# Patient Record
Sex: FEMALE | Race: WHITE | NOT HISPANIC OR LATINO | Employment: OTHER | URBAN - NONMETROPOLITAN AREA
[De-identification: names, ages, dates, MRNs, and addresses within clinical notes are randomized per-mention and may not be internally consistent; named-entity substitution may affect disease eponyms.]

---

## 2017-01-13 ENCOUNTER — OFFICE VISIT (OUTPATIENT)
Dept: FAMILY MEDICINE CLINIC | Facility: CLINIC | Age: 82
End: 2017-01-13

## 2017-01-13 VITALS
DIASTOLIC BLOOD PRESSURE: 72 MMHG | BODY MASS INDEX: 24.63 KG/M2 | TEMPERATURE: 97.7 F | OXYGEN SATURATION: 98 % | SYSTOLIC BLOOD PRESSURE: 118 MMHG | HEIGHT: 63 IN | HEART RATE: 64 BPM | WEIGHT: 139 LBS

## 2017-01-13 DIAGNOSIS — J06.9 ACUTE URI: ICD-10-CM

## 2017-01-13 DIAGNOSIS — J44.1 ACUTE EXACERBATION OF CHRONIC OBSTRUCTIVE AIRWAYS DISEASE (HCC): Primary | ICD-10-CM

## 2017-01-13 DIAGNOSIS — J20.9 ACUTE BRONCHITIS, UNSPECIFIED ORGANISM: ICD-10-CM

## 2017-01-13 PROCEDURE — 96372 THER/PROPH/DIAG INJ SC/IM: CPT | Performed by: INTERNAL MEDICINE

## 2017-01-13 PROCEDURE — 99213 OFFICE O/P EST LOW 20 MIN: CPT | Performed by: INTERNAL MEDICINE

## 2017-01-13 RX ORDER — TRIAMCINOLONE ACETONIDE 40 MG/ML
10 INJECTION, SUSPENSION INTRA-ARTICULAR; INTRAMUSCULAR ONCE
Status: COMPLETED | OUTPATIENT
Start: 2017-01-13 | End: 2017-01-13

## 2017-01-13 RX ORDER — METHYLPREDNISOLONE ACETATE 80 MG/ML
80 INJECTION, SUSPENSION INTRA-ARTICULAR; INTRALESIONAL; INTRAMUSCULAR; SOFT TISSUE ONCE
Status: COMPLETED | OUTPATIENT
Start: 2017-01-13 | End: 2017-01-13

## 2017-01-13 RX ORDER — CEFUROXIME AXETIL 250 MG/1
250 TABLET ORAL 2 TIMES DAILY
COMMUNITY
End: 2017-01-13 | Stop reason: SDUPTHER

## 2017-01-13 RX ORDER — CEFUROXIME AXETIL 250 MG/1
250 TABLET ORAL 2 TIMES DAILY
Qty: 14 TABLET | Refills: 0 | Status: SHIPPED | OUTPATIENT
Start: 2017-01-13 | End: 2017-05-12

## 2017-01-13 RX ADMIN — METHYLPREDNISOLONE ACETATE 80 MG: 80 INJECTION, SUSPENSION INTRA-ARTICULAR; INTRALESIONAL; INTRAMUSCULAR; SOFT TISSUE at 16:11

## 2017-01-13 RX ADMIN — TRIAMCINOLONE ACETONIDE 10 MG: 40 INJECTION, SUSPENSION INTRA-ARTICULAR; INTRAMUSCULAR at 16:11

## 2017-01-13 NOTE — MR AVS SNAPSHOT
Neisha Duran   1/13/2017 3:00 PM   Office Visit    Dept Phone:  [phone]   Encounter #:  [id number]    Provider:  Keith Causey MD   Department:  [facility]                Your Full Care Plan              Where to Get Your Medications      These medications were sent to [facility] - [place], [place] - [address] [place] [address] - [phone]  - [phone]   [address] [address]     Phone:  [phone]     cefuroxime 250 MG tablet            Your Updated Medication List          This list is accurate as of: 1/13/17  3:55 PM.  Always use your most recent med list.                amLODIPine 10 MG tablet   Commonly known as:  NORVASC       calcium carbonate 1250 (500 CA) MG/5ML       cefuroxime 250 MG tablet   Commonly known as:  CEFTIN   Take 1 tablet by mouth 2 (Two) Times a Day.       clopidogrel 75 MG tablet   Commonly known as:  PLAVIX   TAKE 1 TABLET BY MOUTH EVERY DAY       Cyanocobalamin 5000 MCG capsule       enalapril 20 MG tablet   Commonly known as:  VASOTEC       iron polysaccharides 150 MG capsule   Commonly known as:  NIFEREX       levETIRAcetam  MG 24 hr tablet   Commonly known as:  KEPPRA XR   TAKE 6 TABLETS EVERY EVENING WITH SUPPER       pantoprazole 40 MG EC tablet   Commonly known as:  PROTONIX   TAKE 1 TABLET BY MOUTH TWICE DAILY       phenytoin 100 MG ER capsule   Commonly known as:  DILANTIN   TAKE 3 CAPSULES BY MOUTH EVERY OTHER DAY AT BEDTIME ALTERNATING WITH 4 CAPSULES EVERY OTHER DAY AT BEDTIME       simvastatin 40 MG tablet   Commonly known as:  ZOCOR       sotalol 80 MG tablet   Commonly known as:  BETAPACE       venlafaxine XR 75 MG 24 hr capsule   Commonly known as:  EFFEXOR-XR       VITAMIN D2 PO               Instructions     None    Patient Instructions History      Upcoming Appointments     Visit Type Date Time Department    OFFICE VISIT 1/13/2017  3:00 PM [facility]  "[facility]    OFFICE VISIT 5/12/2017 11:00 AM [facility]      MyChart Signup     Our records indicate that you have declined [facility] sign[facility]. If you would like to sign up for CatchMe!hart, please email [email or web address] or call [phone] to obtain an activation code.             Other Info from Your Visit           Your Appointments     May 12, 2017 11:00 AM CDT   Office Visit with Keith Causey MD   [facility] (--)    [address] [place] [address] [address] [place] [address] [phone]           Arrive 15 minutes prior to appointment.              Allergies     Hydrochlorothiazide        Reason for Visit     Follow-up f/u from urgent care      Vital Signs     Blood Pressure Pulse Temperature Height Weight Oxygen Saturation    118/72 64 97.7 °F (36.5 °C) 63\" (160 cm) 139 lb (63 kg) 98%    Body Mass Index Smoking Status                24.62 kg/m2 Former Smoker            "

## 2017-01-13 NOTE — PROGRESS NOTES
"Subjective        History of Present Illness     Neisha Duran is a 82 y.o. female with multiple complex issues including COPD  here to follow up on visit to Urgent Care on 01/02/17 where she was treated for sinusitis with a 10-day course of Ceftin.  She seems to be describing symptoms of COPD exacerbation much more than acute sinusitis, both then and now.  She continues to have a persistent frequent paroxysmal cough and some occasional green nasal discharge.   She denies sore throat.  No facial pain or purulent nasal discharge.  She is taking phenylephrine 10 mg once daily as well as Mucinex.  Antibiotic choices are limited due to sotalol.  We will extend her Ceftin for seven additional days and give her a steroid injection.   They feel that inhalers have aggravated dizziness or unsteadiness in the past, and are reluctant to try any of those again.    She continues to have some dizziness and unsteady gait causing occasional falls.  Her daughter reports she plans to enroll her in [employer] to help with  conditioning and strengthening.     Blood pressure 118/72, pulse 64, temperature 97.7 °F (36.5 °C), height 63\" (160 cm), weight 139 lb (63 kg), SpO2 98 %.      Review of Systems   Constitutional: Negative for chills, fatigue and fever.   HENT: Positive for congestion and postnasal drip. Negative for ear pain, sinus pressure and sore throat.    Respiratory: Positive for cough and wheezing. Negative for shortness of breath.    Cardiovascular: Negative for chest pain, palpitations and leg swelling.   Gastrointestinal: Negative for abdominal pain, blood in stool, constipation, diarrhea, nausea and vomiting.   Endocrine: Negative for cold intolerance, heat intolerance, polydipsia and polyuria.   Genitourinary: Negative for dysuria, frequency, hematuria and urgency.   Skin: Negative for rash.   Neurological: Positive for weakness. Negative for dizziness and syncope.        Objective     Physical Exam "   Constitutional: She is oriented to person, place, and time. She appears well-developed and well-nourished. No distress.   HENT:   Head: Normocephalic and atraumatic.   Nose: Right sinus exhibits no maxillary sinus tenderness and no frontal sinus tenderness. Left sinus exhibits no maxillary sinus tenderness and no frontal sinus tenderness.   Mouth/Throat: Uvula is midline, oropharynx is clear and moist and mucous membranes are normal. No oral lesions. No tonsillar exudate.   Clear postnasal drip.    Eyes: Conjunctivae and EOM are normal. Pupils are equal, round, and reactive to light.   Neck: Trachea normal. Neck supple. No JVD present. Carotid bruit is not present. No tracheal deviation present. No thyroid mass and no thyromegaly present.   Cardiovascular: Normal rate, regular rhythm and normal heart sounds.   No extrasystoles are present. PMI is not displaced.    No murmur heard.  2/6 murmur heard at the left sternal border across the precordium. Defibrillator/pacemaker noted below left clavicle. No significant peripheral edema noted today    Pulmonary/Chest: Effort normal. No accessory muscle usage. No respiratory distress. She has no decreased breath sounds. She has wheezes. She has rhonchi. She has no rales.   Bilateral bronchial sounds.  Bilateral rhonchi and wheezes heard.    Abdominal: Soft. Bowel sounds are normal. She exhibits no distension. There is no hepatosplenomegaly. There is no tenderness.       Vascular Status -  Her exam exhibits right foot vasculature normal. Her exam exhibits no right foot edema. Her exam exhibits left foot vasculature normal. Her exam exhibits no left foot edema.  Lymphadenopathy:     She has no cervical adenopathy.   Neurological: She is alert and oriented to person, place, and time. No cranial nerve deficit. Coordination normal.   Skin: Skin is warm, dry and intact. No rash noted. No cyanosis. Nails show no clubbing.   Psychiatric: She has a normal mood and affect. Her  speech is normal and behavior is normal. Thought content normal.   Vitals reviewed.     Future Appointments  Date Time Provider Department Center   5/12/2017 11:00 AM Keith Causey MD [facility] None         Assessment/Plan      We will extend her Ceftin 250 mg b.i.d. for 7 additional days.      Kenalog 10 mg and Depo-Medrol 80 mg IM without difficulty or complications.  She tolerated well.      Scribed for Dr. Causey by Erin Cavazos [facility].     Diagnoses and all orders for this visit:    Acute exacerbation of chronic obstructive airways disease    Acute bronchitis, unspecified organism    Acute URI    Other orders  -     Discontinue: cefuroxime (CEFTIN) 250 MG tablet; Take 250 mg by mouth 2 (Two) Times a Day.  -     cefuroxime (CEFTIN) 250 MG tablet; Take 1 tablet by mouth 2 (Two) Times a Day.      No visits with results within 3 Week(s) from this visit.  Latest known visit with results is:    Hospital Outpatient Visit on 11/02/2016   Component Date Value Ref Range Status   • WBC 11/02/2016 4.5  3.2 - 9.8 x1000/uL Final   • RBC 11/02/2016 3.88  3.77 - 5.16 aung/mm3 Final   • Hemoglobin 11/02/2016 12.6  12.0 - 15.5 gm/dl Final   • Hematocrit 11/02/2016 36.9  35.0 - 45.0 % Final   • MCV 11/02/2016 95.1  80.0 - 98.0 fl Final   • MCH 11/02/2016 32.5  26.0 - 34.0 pg Final   • MCHC 11/02/2016 34.1  31.4 - 36.0 gm/dl Final   • Platelets 11/02/2016 182  150 - 450 x1000/mm3 Final   • RDW 11/02/2016 12.0  11.5 - 14.5 % Final   • MPV 11/02/2016 9.2  8.0 - 12.0 fl Final   • Neutrophil Rel % 11/02/2016 59.6  37.0 - 80.0 % Final   • Lymphocyte Rel % 11/02/2016 28.3  10.0 - 50.0 % Final   • Monocyte Rel % 11/02/2016 9.4  0.0 - 12.0 % Final   • Eosinophil Rel % 11/02/2016 2.0  0.0 - 7.0 % Final   • Basophil Rel % 11/02/2016 0.7  0.0 - 2.0 % Final   • nRBC 11/02/2016 0   Final   • nRBC 11/02/2016 0   Final   • Phenytoin Level 11/02/2016 20.2* 10.0 - 20.0 ug/ml Final   • 25 Hydroxy, Vitamin D 11/02/2016 26.4* 30.0 - 100.0 ng/ml  Final    Comment: INTERPRETIVE INFORMATION:  Deficient...................<20 ng/ml  Insufficient..........20-<30 ng/ml  Sufficient.............. ng/ml  Potiential Toxicity.....100 ng/ml       • Ferritin 11/02/2016 18.5  11.1 - 264.0 ng/ml Final   • Vitamin B-12 11/02/2016 >1000* 239 - 931 pg/ml Final   • LDL Cholesterol  11/02/2016 82.0  0.0 - 129.0 mg/dl Final    LDL DESIRED: < 130 MG/DL   • Glucose 11/02/2016 95  70.0 - 100.0 mg/dl Final   • BUN 11/02/2016 17  8.0 - 25.0 mg/dl Final   • Creatinine 11/02/2016 0.8  0.4 - 1.3 mg/dl Final   • Sodium 11/02/2016 141.0  134 - 146 mmol/L Final   • Potassium 11/02/2016 4.5  3.4 - 5.4 mmol/L Final   • Chloride 11/02/2016 107.0  100.0 - 112.0 mmol/L Final   • CO2 11/02/2016 27.0  20.0 - 32.0 mmol/L Final   • Calcium 11/02/2016 9.0  8.4 - 10.8 mg/dl Final   • Total Protein 11/02/2016 6.5* 6.7 - 8.2 gm/dl Final   • Albumin 11/02/2016 3.9  3.2 - 5.5 gm/dl Final   • Total Bilirubin 11/02/2016 0.6  0.2 - 1.0 mg/dl Final   • Alkaline Phosphatase 11/02/2016 27  15 - 121 U/L Final   • ALT (SGPT) 11/02/2016 13  10 - 60 U/L Final   • AST (SGOT) 11/02/2016 22  10 - 60 U/L Final   • GFR MDRD Non  11/02/2016 69  39 - 90 mL/min/1.73 sq.M Final    Comment: Invalid if creatinine is changing or the patient is on dialysis. Use AA  result if patient is -American, non AA result otherwise.     • GFR MDRD  11/02/2016 83  39 - 90 mL/min/1.73 sq.M Final   • Anion Gap 11/02/2016 7.0  5.0 - 15.0 mmol/L Final   • Magnesium 11/02/2016 2.00  1.80 - 2.50 mg/dl Final   ]

## 2017-04-06 RX ORDER — VENLAFAXINE HYDROCHLORIDE 75 MG/1
CAPSULE, EXTENDED RELEASE ORAL
Qty: 30 CAPSULE | Refills: 11 | Status: SHIPPED | OUTPATIENT
Start: 2017-04-06 | End: 2018-04-30 | Stop reason: SDUPTHER

## 2017-04-06 RX ORDER — SOTALOL HYDROCHLORIDE 80 MG/1
TABLET ORAL
Qty: 60 TABLET | Refills: 11 | Status: SHIPPED | OUTPATIENT
Start: 2017-04-06 | End: 2018-04-10 | Stop reason: SDUPTHER

## 2017-04-12 RX ORDER — AMLODIPINE BESYLATE 10 MG/1
TABLET ORAL
Qty: 30 TABLET | Refills: 11 | Status: SHIPPED | OUTPATIENT
Start: 2017-04-12 | End: 2017-10-09 | Stop reason: SDUPTHER

## 2017-04-18 RX ORDER — CLOPIDOGREL BISULFATE 75 MG/1
TABLET ORAL
Qty: 30 TABLET | Refills: 11 | Status: SHIPPED | OUTPATIENT
Start: 2017-04-18 | End: 2018-04-10 | Stop reason: SDUPTHER

## 2017-05-04 ENCOUNTER — LAB (OUTPATIENT)
Dept: LAB | Facility: OTHER | Age: 82
End: 2017-05-04

## 2017-05-04 DIAGNOSIS — D50.9 IRON DEFICIENCY ANEMIA, UNSPECIFIED IRON DEFICIENCY ANEMIA TYPE: ICD-10-CM

## 2017-05-04 DIAGNOSIS — J43.9 PULMONARY EMPHYSEMA, UNSPECIFIED EMPHYSEMA TYPE (HCC): ICD-10-CM

## 2017-05-04 DIAGNOSIS — I10 ESSENTIAL HYPERTENSION: ICD-10-CM

## 2017-05-04 DIAGNOSIS — E78.5 HYPERLIPIDEMIA, UNSPECIFIED HYPERLIPIDEMIA TYPE: ICD-10-CM

## 2017-05-04 DIAGNOSIS — G40.909 SEIZURE DISORDER (HCC): Primary | ICD-10-CM

## 2017-05-04 DIAGNOSIS — D51.8 DIETARY VITAMIN B12 DEFICIENCY ANEMIA: ICD-10-CM

## 2017-05-04 DIAGNOSIS — E55.9 VITAMIN D DEFICIENCY: ICD-10-CM

## 2017-05-04 DIAGNOSIS — E83.42 HYPOMAGNESEMIA: ICD-10-CM

## 2017-05-04 LAB
ALBUMIN SERPL-MCNC: 4.1 G/DL (ref 3.2–5.5)
ALBUMIN/GLOB SERPL: 1.4 G/DL (ref 1–3)
ALP SERPL-CCNC: 32 U/L (ref 15–121)
ALT SERPL W P-5'-P-CCNC: 11 U/L (ref 10–60)
ANION GAP SERPL CALCULATED.3IONS-SCNC: 13 MMOL/L (ref 5–15)
AST SERPL-CCNC: 21 U/L (ref 10–60)
BASOPHILS # BLD AUTO: 0.04 10*3/MM3 (ref 0–0.2)
BASOPHILS NFR BLD AUTO: 1 % (ref 0–2)
BILIRUB SERPL-MCNC: 0.5 MG/DL (ref 0.2–1)
BUN BLD-MCNC: 15 MG/DL (ref 8–25)
BUN/CREAT SERPL: 15 (ref 7–25)
CALCIUM SPEC-SCNC: 9.3 MG/DL (ref 8.4–10.8)
CHLORIDE SERPL-SCNC: 107 MMOL/L (ref 100–112)
CHOLEST SERPL-MCNC: 210 MG/DL (ref 150–200)
CO2 SERPL-SCNC: 23 MMOL/L (ref 20–32)
CREAT BLD-MCNC: 1 MG/DL (ref 0.4–1.3)
DEPRECATED RDW RBC AUTO: 42.1 FL (ref 36.4–46.3)
EOSINOPHIL # BLD AUTO: 0.09 10*3/MM3 (ref 0–0.7)
EOSINOPHIL NFR BLD AUTO: 2.2 % (ref 0–7)
ERYTHROCYTE [DISTWIDTH] IN BLOOD BY AUTOMATED COUNT: 12.4 % (ref 11.5–14.5)
GFR SERPL CREATININE-BSD FRML MDRD: 53 ML/MIN/1.73 (ref 39–90)
GLOBULIN UR ELPH-MCNC: 2.9 GM/DL (ref 2.5–4.6)
GLUCOSE BLD-MCNC: 112 MG/DL (ref 70–100)
HCT VFR BLD AUTO: 38.5 % (ref 35–45)
HDLC SERPL-MCNC: 81 MG/DL (ref 35–100)
HGB BLD-MCNC: 13.2 G/DL (ref 12–15.5)
LDLC SERPL CALC-MCNC: 98 MG/DL
LDLC/HDLC SERPL: 1.21 {RATIO}
LYMPHOCYTES # BLD AUTO: 1.27 10*3/MM3 (ref 0.6–4.2)
LYMPHOCYTES NFR BLD AUTO: 31.1 % (ref 10–50)
MAGNESIUM SERPL-MCNC: 2.2 MG/DL (ref 1.8–2.5)
MCH RBC QN AUTO: 33.1 PG (ref 26.5–34)
MCHC RBC AUTO-ENTMCNC: 34.3 G/DL (ref 31.4–36)
MCV RBC AUTO: 96.5 FL (ref 80–98)
MONOCYTES # BLD AUTO: 0.36 10*3/MM3 (ref 0–0.9)
MONOCYTES NFR BLD AUTO: 8.8 % (ref 0–12)
NEUTROPHILS # BLD AUTO: 2.32 10*3/MM3 (ref 2–8.6)
NEUTROPHILS NFR BLD AUTO: 56.9 % (ref 37–80)
PLATELET # BLD AUTO: 174 10*3/MM3 (ref 150–450)
PMV BLD AUTO: 9.3 FL (ref 8–12)
POTASSIUM BLD-SCNC: 4.9 MMOL/L (ref 3.4–5.4)
PROT SERPL-MCNC: 7 G/DL (ref 6.7–8.2)
RBC # BLD AUTO: 3.99 10*6/MM3 (ref 3.77–5.16)
SODIUM BLD-SCNC: 143 MMOL/L (ref 134–146)
TRIGL SERPL-MCNC: 156 MG/DL (ref 35–160)
VLDLC SERPL-MCNC: 31.2 MG/DL
WBC NRBC COR # BLD: 4.08 10*3/MM3 (ref 3.2–9.8)

## 2017-05-04 PROCEDURE — 82306 VITAMIN D 25 HYDROXY: CPT | Performed by: INTERNAL MEDICINE

## 2017-05-04 PROCEDURE — 80185 ASSAY OF PHENYTOIN TOTAL: CPT | Performed by: INTERNAL MEDICINE

## 2017-05-04 PROCEDURE — 83735 ASSAY OF MAGNESIUM: CPT | Performed by: INTERNAL MEDICINE

## 2017-05-04 PROCEDURE — 85025 COMPLETE CBC W/AUTO DIFF WBC: CPT | Performed by: INTERNAL MEDICINE

## 2017-05-04 PROCEDURE — 82728 ASSAY OF FERRITIN: CPT | Performed by: INTERNAL MEDICINE

## 2017-05-04 PROCEDURE — 84443 ASSAY THYROID STIM HORMONE: CPT | Performed by: INTERNAL MEDICINE

## 2017-05-04 PROCEDURE — 82607 VITAMIN B-12: CPT | Performed by: INTERNAL MEDICINE

## 2017-05-04 PROCEDURE — 80061 LIPID PANEL: CPT | Performed by: INTERNAL MEDICINE

## 2017-05-04 PROCEDURE — 80053 COMPREHEN METABOLIC PANEL: CPT | Performed by: INTERNAL MEDICINE

## 2017-05-05 LAB
25(OH)D3 SERPL-MCNC: 39.5 NG/ML (ref 30–100)
FERRITIN SERPL-MCNC: 44.1 NG/ML (ref 11.1–264)
PHENYTOIN SERPL-MCNC: 25.2 MCG/ML (ref 10–20)
TSH SERPL DL<=0.05 MIU/L-ACNC: 2.2 MIU/ML (ref 0.46–4.68)
VIT B12 BLD-MCNC: 970 PG/ML (ref 239–931)

## 2017-05-12 ENCOUNTER — OFFICE VISIT (OUTPATIENT)
Dept: FAMILY MEDICINE CLINIC | Facility: CLINIC | Age: 82
End: 2017-05-12

## 2017-05-12 VITALS
HEART RATE: 56 BPM | BODY MASS INDEX: 25.12 KG/M2 | DIASTOLIC BLOOD PRESSURE: 78 MMHG | SYSTOLIC BLOOD PRESSURE: 132 MMHG | WEIGHT: 141.8 LBS | HEIGHT: 63 IN | TEMPERATURE: 98.8 F

## 2017-05-12 DIAGNOSIS — J43.9 PULMONARY EMPHYSEMA, UNSPECIFIED EMPHYSEMA TYPE (HCC): ICD-10-CM

## 2017-05-12 DIAGNOSIS — I10 ESSENTIAL HYPERTENSION: ICD-10-CM

## 2017-05-12 DIAGNOSIS — G40.909 SEIZURE DISORDER (HCC): ICD-10-CM

## 2017-05-12 DIAGNOSIS — E55.9 VITAMIN D DEFICIENCY: ICD-10-CM

## 2017-05-12 DIAGNOSIS — M81.0 OSTEOPOROSIS OF FEMUR WITHOUT PATHOLOGICAL FRACTURE: Primary | ICD-10-CM

## 2017-05-12 DIAGNOSIS — M81.0 OSTEOPOROSIS: ICD-10-CM

## 2017-05-12 DIAGNOSIS — D51.8 DIETARY VITAMIN B12 DEFICIENCY ANEMIA: ICD-10-CM

## 2017-05-12 DIAGNOSIS — E78.01 FAMILIAL HYPERCHOLESTEROLEMIA: Primary | ICD-10-CM

## 2017-05-12 DIAGNOSIS — D50.9 IRON DEFICIENCY ANEMIA, UNSPECIFIED IRON DEFICIENCY ANEMIA TYPE: ICD-10-CM

## 2017-05-12 DIAGNOSIS — E83.42 HYPOMAGNESEMIA: ICD-10-CM

## 2017-05-12 DIAGNOSIS — F33.41 RECURRENT MAJOR DEPRESSIVE DISORDER, IN PARTIAL REMISSION (HCC): ICD-10-CM

## 2017-05-12 PROCEDURE — 99214 OFFICE O/P EST MOD 30 MIN: CPT | Performed by: INTERNAL MEDICINE

## 2017-05-12 RX ORDER — PHENYTOIN SODIUM 100 MG/1
300 CAPSULE, EXTENDED RELEASE ORAL NIGHTLY
Qty: 90 CAPSULE | Refills: 11 | Status: SHIPPED | OUTPATIENT
Start: 2017-05-12 | End: 2018-05-14 | Stop reason: SDUPTHER

## 2017-05-19 RX ORDER — ENALAPRIL MALEATE 20 MG/1
TABLET ORAL
Qty: 180 TABLET | Refills: 3 | Status: CANCELLED | OUTPATIENT
Start: 2017-05-19

## 2017-05-23 RX ORDER — ENALAPRIL MALEATE 20 MG/1
20 TABLET ORAL 2 TIMES DAILY
Qty: 60 TABLET | Refills: 11 | Status: SHIPPED | OUTPATIENT
Start: 2017-05-23 | End: 2018-05-21 | Stop reason: SDUPTHER

## 2017-06-05 RX ORDER — SIMVASTATIN 40 MG
TABLET ORAL
Qty: 90 TABLET | Refills: 3 | Status: SHIPPED | OUTPATIENT
Start: 2017-06-05 | End: 2018-06-11 | Stop reason: SDUPTHER

## 2017-06-19 ENCOUNTER — OFFICE VISIT (OUTPATIENT)
Dept: FAMILY MEDICINE CLINIC | Facility: CLINIC | Age: 82
End: 2017-06-19

## 2017-06-19 VITALS
DIASTOLIC BLOOD PRESSURE: 60 MMHG | TEMPERATURE: 97.6 F | HEART RATE: 64 BPM | HEIGHT: 63 IN | SYSTOLIC BLOOD PRESSURE: 130 MMHG | WEIGHT: 145 LBS | BODY MASS INDEX: 25.69 KG/M2

## 2017-06-19 DIAGNOSIS — I10 ESSENTIAL HYPERTENSION: Chronic | ICD-10-CM

## 2017-06-19 DIAGNOSIS — Z86.79 HISTORY OF SUSTAINED VENTRICULAR TACHYCARDIA: Primary | ICD-10-CM

## 2017-06-19 PROCEDURE — 99213 OFFICE O/P EST LOW 20 MIN: CPT | Performed by: INTERNAL MEDICINE

## 2017-06-19 NOTE — PROGRESS NOTES
Subjective          History of Present Illness     Neisha Duran is an 82 y.o. female here today to have a recheck on the incision site where she had the battery replaced in her defibrillator/pacemaker 12 days ago on 06/07/17 by Dr. Nuñez.  Area appears to be healing well.  There is no heat, tenderness or drainage is noted to the area today.  She has multiple layers of Steri strips, for which I recommend she gradually remove.  Wound care instructions given.      Review of Systems   Constitutional: Negative for chills, fatigue and fever.   HENT: Negative for congestion, ear pain, postnasal drip, sinus pressure and sore throat.    Respiratory: Negative for cough, shortness of breath and wheezing.    Cardiovascular: Negative for chest pain, palpitations and leg swelling.   Gastrointestinal: Negative for abdominal pain, blood in stool, constipation, diarrhea, nausea and vomiting.   Endocrine: Negative for cold intolerance, heat intolerance, polydipsia and polyuria.   Genitourinary: Negative for dysuria, frequency, hematuria and urgency.   Skin: Negative for rash.   Neurological: Negative for syncope and weakness.      PHQ-9 Depression Screening 5/12/2017   Little interest or pleasure in doing things 0   Feeling down, depressed, or hopeless 0   Trouble falling or staying asleep, or sleeping too much 0   Feeling tired or having little energy 0   Poor appetite or overeating 0   Feeling bad about yourself - or that you are a failure or have let yourself or your family down 0   Trouble concentrating on things, such as reading the newspaper or watching television 0   Moving or speaking so slowly that other people could have noticed. Or the opposite - being so fidgety or restless that you have been moving around a lot more than usual 0   Thoughts that you would be better off dead, or of hurting yourself in some way 0   PHQ-9 Total Score 0   If you checked off any problems, how difficult have these problems made it for you  "to do your work, take care of things at home, or get along with other people? Not difficult at all         Objective     Vitals:    06/19/17 1332   BP: 130/60   Pulse: 64   Temp: 97.6 °F (36.4 °C)   TempSrc: Oral   Weight: 145 lb (65.8 kg)   Height: 63\" (160 cm)       Physical Exam   Constitutional: She is oriented to person, place, and time. She appears well-developed and well-nourished. No distress.   Neck: Neck supple. No JVD present. No thyromegaly present.   Cardiovascular: Normal rate, regular rhythm and normal heart sounds.    No significant peripheral edema .  2/6 murmur heard  at left sternal border and across the precordium.  Defibrillator/packmaker noted below left clavicle.    Pulmonary/Chest: Effort normal and breath sounds normal. No accessory muscle usage. No respiratory distress. She has no wheezes. She has no rales.   Chronic lung sounds, but no respiratory distress    Abdominal: Soft. Bowel sounds are normal. She exhibits no distension. There is no tenderness.   Musculoskeletal: She exhibits no edema.   Lymphadenopathy:     She has no cervical adenopathy.   Neurological: She is alert and oriented to person, place, and time. No cranial nerve deficit.   Psychiatric: She has a normal mood and affect. Her speech is normal and behavior is normal.     Future Appointments  Date Time Provider Department Center   7/14/2017 10:00 AM [facility] 1 [facility] [place]   11/17/2017 11:00 AM Keith Causey MD [facility] None       Assessment/Plan      The incision site appears to be healing well.  She can gradually remove the Steri stripsAs they loosen.  Wound care instructions reviewed today.     Continue current blood pressure medications and sotalol.    She will return in November for her routine follow up with fasting labs one week prior.       Scribed for Dr. Causey by Erin Cavazos [facility].     Diagnoses and all orders for this visit:    History of sustained ventricular tachycardia    Essential " hypertension      No visits with results within 3 Week(s) from this visit.  Latest known visit with results is:    Lab on 05/04/2017   Component Date Value Ref Range Status   • Vitamin B-12 05/04/2017 970* 239 - 931 pg/mL Final   • WBC 05/04/2017 4.08  3.20 - 9.80 10*3/mm3 Final   • RBC 05/04/2017 3.99  3.77 - 5.16 10*6/mm3 Final   • Hemoglobin 05/04/2017 13.2  12.0 - 15.5 g/dL Final   • Hematocrit 05/04/2017 38.5  35.0 - 45.0 % Final   • MCV 05/04/2017 96.5  80.0 - 98.0 fL Final   • MCH 05/04/2017 33.1  26.5 - 34.0 pg Final   • MCHC 05/04/2017 34.3  31.4 - 36.0 g/dL Final   • RDW 05/04/2017 12.4  11.5 - 14.5 % Final   • RDW-SD 05/04/2017 42.1  36.4 - 46.3 fl Final   • MPV 05/04/2017 9.3  8.0 - 12.0 fL Final   • Platelets 05/04/2017 174  150 - 450 10*3/mm3 Final   • Neutrophil % 05/04/2017 56.9  37.0 - 80.0 % Final   • Lymphocyte % 05/04/2017 31.1  10.0 - 50.0 % Final   • Monocyte % 05/04/2017 8.8  0.0 - 12.0 % Final   • Eosinophil % 05/04/2017 2.2  0.0 - 7.0 % Final   • Basophil % 05/04/2017 1.0  0.0 - 2.0 % Final   • Neutrophils, Absolute 05/04/2017 2.32  2.00 - 8.60 10*3/mm3 Final   • Lymphocytes, Absolute 05/04/2017 1.27  0.60 - 4.20 10*3/mm3 Final   • Monocytes, Absolute 05/04/2017 0.36  0.00 - 0.90 10*3/mm3 Final   • Eosinophils, Absolute 05/04/2017 0.09  0.00 - 0.70 10*3/mm3 Final   • Basophils, Absolute 05/04/2017 0.04  0.00 - 0.20 10*3/mm3 Final   • Glucose 05/04/2017 112* 70 - 100 mg/dL Final   • BUN 05/04/2017 15  8 - 25 mg/dL Final   • Creatinine 05/04/2017 1.00  0.40 - 1.30 mg/dL Final   • Sodium 05/04/2017 143  134 - 146 mmol/L Final   • Potassium 05/04/2017 4.9  3.4 - 5.4 mmol/L Final   • Chloride 05/04/2017 107  100 - 112 mmol/L Final   • CO2 05/04/2017 23.0  20.0 - 32.0 mmol/L Final   • Calcium 05/04/2017 9.3  8.4 - 10.8 mg/dL Final   • Total Protein 05/04/2017 7.0  6.7 - 8.2 g/dL Final   • Albumin 05/04/2017 4.10  3.20 - 5.50 g/dL Final   • ALT (SGPT) 05/04/2017 11  10 - 60 U/L Final   • AST (SGOT)  05/04/2017 21  10 - 60 U/L Final   • Alkaline Phosphatase 05/04/2017 32  15 - 121 U/L Final   • Total Bilirubin 05/04/2017 0.5  0.2 - 1.0 mg/dL Final   • eGFR Non African Amer 05/04/2017 53  39 - 90 mL/min/1.73 Final   • Globulin 05/04/2017 2.9  2.5 - 4.6 gm/dL Final   • A/G Ratio 05/04/2017 1.4  1.0 - 3.0 g/dL Final   • BUN/Creatinine Ratio 05/04/2017 15.0  7.0 - 25.0 Final   • Anion Gap 05/04/2017 13.0  5.0 - 15.0 mmol/L Final   • Ferritin 05/04/2017 44.10  11.10 - 264.00 ng/mL Final   • Total Cholesterol 05/04/2017 210* 150 - 200 mg/dL Final   • Triglycerides 05/04/2017 156  35 - 160 mg/dL Final   • HDL Cholesterol 05/04/2017 81  35 - 100 mg/dL Final   • LDL Cholesterol  05/04/2017 98  mg/dL Final   • VLDL Cholesterol 05/04/2017 31.2  mg/dL Final   • LDL/HDL Ratio 05/04/2017 1.21   Final   • Magnesium 05/04/2017 2.2  1.8 - 2.5 mg/dL Final   • 25 Hydroxy, Vitamin D 05/04/2017 39.5  30.0 - 100.0 ng/ml Final   • TSH 05/04/2017 2.200  0.460 - 4.680 mIU/mL Final   • Phenytoin Level 05/04/2017 25.2* 10.0 - 20.0 mcg/mL Final   ]

## 2017-06-27 RX ORDER — ERGOCALCIFEROL 1.25 MG/1
CAPSULE ORAL
Qty: 4 CAPSULE | Refills: 3 | Status: SHIPPED | OUTPATIENT
Start: 2017-06-27 | End: 2018-06-29 | Stop reason: SDUPTHER

## 2017-08-01 RX ORDER — IRON POLYSACCHARIDE COMPLEX 150 MG
CAPSULE ORAL
Qty: 60 CAPSULE | Refills: 11 | Status: SHIPPED | OUTPATIENT
Start: 2017-08-01 | End: 2017-11-17 | Stop reason: SDUPTHER

## 2017-10-03 ENCOUNTER — RESULTS ENCOUNTER (OUTPATIENT)
Dept: FAMILY MEDICINE CLINIC | Facility: CLINIC | Age: 82
End: 2017-10-03

## 2017-10-03 DIAGNOSIS — G40.909 SEIZURE DISORDER (HCC): ICD-10-CM

## 2017-10-10 RX ORDER — AMLODIPINE BESYLATE 10 MG/1
TABLET ORAL
Qty: 30 TABLET | Refills: 0 | Status: SHIPPED | OUTPATIENT
Start: 2017-10-10 | End: 2018-01-08 | Stop reason: SDUPTHER

## 2017-10-30 RX ORDER — PANTOPRAZOLE SODIUM 40 MG/1
TABLET, DELAYED RELEASE ORAL
Qty: 60 TABLET | Refills: 11 | Status: SHIPPED | OUTPATIENT
Start: 2017-10-30 | End: 2018-10-26 | Stop reason: SDUPTHER

## 2017-10-31 RX ORDER — HYDRALAZINE HYDROCHLORIDE 25 MG/1
25 TABLET, FILM COATED ORAL 3 TIMES DAILY
Qty: 90 TABLET | Refills: 6 | Status: SHIPPED | OUTPATIENT
Start: 2017-10-31 | End: 2018-05-21 | Stop reason: SDUPTHER

## 2017-11-08 ENCOUNTER — LAB (OUTPATIENT)
Dept: LAB | Facility: OTHER | Age: 82
End: 2017-11-08

## 2017-11-08 DIAGNOSIS — G40.909 SEIZURE DISORDER (HCC): Primary | ICD-10-CM

## 2017-11-08 DIAGNOSIS — I10 ESSENTIAL HYPERTENSION: ICD-10-CM

## 2017-11-08 DIAGNOSIS — D51.8 DIETARY VITAMIN B12 DEFICIENCY ANEMIA: ICD-10-CM

## 2017-11-08 DIAGNOSIS — D50.9 IRON DEFICIENCY ANEMIA, UNSPECIFIED IRON DEFICIENCY ANEMIA TYPE: ICD-10-CM

## 2017-11-08 DIAGNOSIS — E55.9 VITAMIN D DEFICIENCY: ICD-10-CM

## 2017-11-08 DIAGNOSIS — E78.01 FAMILIAL HYPERCHOLESTEROLEMIA: ICD-10-CM

## 2017-11-08 LAB
25(OH)D3 SERPL-MCNC: 43.5 NG/ML (ref 30–100)
ALBUMIN SERPL-MCNC: 4.2 G/DL (ref 3.2–5.5)
ALBUMIN/GLOB SERPL: 1.4 G/DL (ref 1–3)
ALP SERPL-CCNC: 30 U/L (ref 15–121)
ALT SERPL W P-5'-P-CCNC: 12 U/L (ref 10–60)
ANION GAP SERPL CALCULATED.3IONS-SCNC: 11 MMOL/L (ref 5–15)
ARTICHOKE IGE QN: 82 MG/DL (ref 0–129)
AST SERPL-CCNC: 20 U/L (ref 10–60)
BASOPHILS # BLD AUTO: 0.03 10*3/MM3 (ref 0–0.2)
BASOPHILS NFR BLD AUTO: 0.5 % (ref 0–2)
BILIRUB SERPL-MCNC: 0.9 MG/DL (ref 0.2–1)
BUN BLD-MCNC: 20 MG/DL (ref 8–25)
BUN/CREAT SERPL: 22.2 (ref 7–25)
CALCIUM SPEC-SCNC: 9.2 MG/DL (ref 8.4–10.8)
CHLORIDE SERPL-SCNC: 108 MMOL/L (ref 100–112)
CO2 SERPL-SCNC: 23 MMOL/L (ref 20–32)
CREAT BLD-MCNC: 0.9 MG/DL (ref 0.4–1.3)
DEPRECATED RDW RBC AUTO: 41.4 FL (ref 36.4–46.3)
EOSINOPHIL # BLD AUTO: 0.12 10*3/MM3 (ref 0–0.7)
EOSINOPHIL NFR BLD AUTO: 2 % (ref 0–7)
ERYTHROCYTE [DISTWIDTH] IN BLOOD BY AUTOMATED COUNT: 12.2 % (ref 11.5–14.5)
FERRITIN SERPL-MCNC: 41.3 NG/ML (ref 11.1–264)
GFR SERPL CREATININE-BSD FRML MDRD: 60 ML/MIN/1.73 (ref 39–90)
GLOBULIN UR ELPH-MCNC: 2.9 GM/DL (ref 2.5–4.6)
GLUCOSE BLD-MCNC: 104 MG/DL (ref 70–100)
HCT VFR BLD AUTO: 40.3 % (ref 35–45)
HGB BLD-MCNC: 13.5 G/DL (ref 12–15.5)
LYMPHOCYTES # BLD AUTO: 1.44 10*3/MM3 (ref 0.6–4.2)
LYMPHOCYTES NFR BLD AUTO: 24.1 % (ref 10–50)
MCH RBC QN AUTO: 32.8 PG (ref 26.5–34)
MCHC RBC AUTO-ENTMCNC: 33.5 G/DL (ref 31.4–36)
MCV RBC AUTO: 97.8 FL (ref 80–98)
MONOCYTES # BLD AUTO: 0.6 10*3/MM3 (ref 0–0.9)
MONOCYTES NFR BLD AUTO: 10.1 % (ref 0–12)
NEUTROPHILS # BLD AUTO: 3.78 10*3/MM3 (ref 2–8.6)
NEUTROPHILS NFR BLD AUTO: 63.3 % (ref 37–80)
PHENYTOIN SERPL-MCNC: 12.5 MCG/ML (ref 10–20)
PLATELET # BLD AUTO: 203 10*3/MM3 (ref 150–450)
PMV BLD AUTO: 9.4 FL (ref 8–12)
POTASSIUM BLD-SCNC: 4.4 MMOL/L (ref 3.4–5.4)
PROT SERPL-MCNC: 7.1 G/DL (ref 6.7–8.2)
RBC # BLD AUTO: 4.12 10*6/MM3 (ref 3.77–5.16)
SODIUM BLD-SCNC: 142 MMOL/L (ref 134–146)
VIT B12 BLD-MCNC: >1000 PG/ML (ref 239–931)
WBC NRBC COR # BLD: 5.97 10*3/MM3 (ref 3.2–9.8)

## 2017-11-08 PROCEDURE — 82306 VITAMIN D 25 HYDROXY: CPT | Performed by: INTERNAL MEDICINE

## 2017-11-08 PROCEDURE — 83721 ASSAY OF BLOOD LIPOPROTEIN: CPT | Performed by: INTERNAL MEDICINE

## 2017-11-08 PROCEDURE — 36415 COLL VENOUS BLD VENIPUNCTURE: CPT | Performed by: INTERNAL MEDICINE

## 2017-11-08 PROCEDURE — 82607 VITAMIN B-12: CPT | Performed by: INTERNAL MEDICINE

## 2017-11-08 PROCEDURE — 80185 ASSAY OF PHENYTOIN TOTAL: CPT | Performed by: INTERNAL MEDICINE

## 2017-11-08 PROCEDURE — 80053 COMPREHEN METABOLIC PANEL: CPT | Performed by: INTERNAL MEDICINE

## 2017-11-08 PROCEDURE — 85025 COMPLETE CBC W/AUTO DIFF WBC: CPT | Performed by: INTERNAL MEDICINE

## 2017-11-08 PROCEDURE — 82728 ASSAY OF FERRITIN: CPT | Performed by: INTERNAL MEDICINE

## 2017-11-17 ENCOUNTER — OFFICE VISIT (OUTPATIENT)
Dept: FAMILY MEDICINE CLINIC | Facility: CLINIC | Age: 82
End: 2017-11-17

## 2017-11-17 VITALS
TEMPERATURE: 98.1 F | BODY MASS INDEX: 25.87 KG/M2 | HEART RATE: 64 BPM | OXYGEN SATURATION: 98 % | WEIGHT: 146 LBS | HEIGHT: 63 IN | SYSTOLIC BLOOD PRESSURE: 160 MMHG | DIASTOLIC BLOOD PRESSURE: 68 MMHG

## 2017-11-17 DIAGNOSIS — I63.219 OCCLUSION AND STENOSIS OF VERTEBRAL ARTERY WITH CEREBRAL INFARCTION (HCC): Chronic | ICD-10-CM

## 2017-11-17 DIAGNOSIS — F33.41 RECURRENT MAJOR DEPRESSIVE DISORDER, IN PARTIAL REMISSION (HCC): Chronic | ICD-10-CM

## 2017-11-17 DIAGNOSIS — I65.23 BILATERAL CAROTID ARTERY STENOSIS: Chronic | ICD-10-CM

## 2017-11-17 DIAGNOSIS — D51.8 DIETARY VITAMIN B12 DEFICIENCY ANEMIA: Chronic | ICD-10-CM

## 2017-11-17 DIAGNOSIS — D50.8 IRON DEFICIENCY ANEMIA SECONDARY TO INADEQUATE DIETARY IRON INTAKE: Chronic | ICD-10-CM

## 2017-11-17 DIAGNOSIS — I10 ESSENTIAL HYPERTENSION: Primary | Chronic | ICD-10-CM

## 2017-11-17 DIAGNOSIS — G40.909 SEIZURE DISORDER (HCC): Chronic | ICD-10-CM

## 2017-11-17 DIAGNOSIS — E55.9 VITAMIN D DEFICIENCY: Chronic | ICD-10-CM

## 2017-11-17 DIAGNOSIS — E83.42 HYPOMAGNESEMIA: Chronic | ICD-10-CM

## 2017-11-17 DIAGNOSIS — J44.1 ACUTE EXACERBATION OF CHRONIC OBSTRUCTIVE AIRWAYS DISEASE (HCC): Chronic | ICD-10-CM

## 2017-11-17 DIAGNOSIS — M81.0 AGE-RELATED OSTEOPOROSIS WITHOUT CURRENT PATHOLOGICAL FRACTURE: Chronic | ICD-10-CM

## 2017-11-17 DIAGNOSIS — J43.1 PANLOBULAR EMPHYSEMA (HCC): Chronic | ICD-10-CM

## 2017-11-17 DIAGNOSIS — E78.2 MIXED HYPERLIPIDEMIA: Chronic | ICD-10-CM

## 2017-11-17 DIAGNOSIS — Z86.79 HISTORY OF SUSTAINED VENTRICULAR TACHYCARDIA: Chronic | ICD-10-CM

## 2017-11-17 PROCEDURE — 99214 OFFICE O/P EST MOD 30 MIN: CPT | Performed by: INTERNAL MEDICINE

## 2017-11-17 RX ORDER — FLUTICASONE PROPIONATE 50 MCG
1 SPRAY, SUSPENSION (ML) NASAL 2 TIMES DAILY
Qty: 1 BOTTLE | Refills: 11 | Status: SHIPPED | OUTPATIENT
Start: 2017-11-17

## 2017-11-17 RX ORDER — IRON POLYSACCHARIDE COMPLEX 150 MG
150 CAPSULE ORAL EVERY OTHER DAY
Qty: 60 CAPSULE | Refills: 11 | COMMUNITY
Start: 2017-11-17 | End: 2019-12-20

## 2017-11-17 NOTE — PROGRESS NOTES
Subjective          History of Present Illness     Neisha Duran is a 82 y.o. female who returns to 6-month follow up on multiple complex issues including extensive vascular disease, hyperlipidemia, hypertension, carotid artery stenosis, COPD, and iron deficiency anemia among many other issues. She has a defibirillator/packmaker.    Her blood pressure remains above goal today.  She brings in a blood pressure diary today. We added hydralazine 25 mg t.i.d. two weeks ago.  I was going to increase the dose to 50 mg t.i.d.  However, in conversation with her daughter, she has been taking phenylephrine twice daily for a cough.  I recommended she discontinue the phenylephrine.  She will continue to monitor blood pressure and keep a diary.      She has had a frequent paroxysmal cough productive of white sputum for the past two months.   She has been taking phenylephrine twice daily.  I am having her stop the phenylephrine as above and start Flonase nasal spray for the postnasal drainage and chronic cough.         DEXA dated 07/2017 revealed normal density in the lumbar spine, but persistent osteoporosis of the left hip.  She continues the calcium and vitamin D supplements.  She has not had a Prolia injection.  She is reluctant to have it.  It was $489 and a friend had told her it didn't improve symptoms.  We discussed fall risk and I recommended she have the Prolia every 6 months. She reluctantly agrees.         The patient's relevant past medical, surgical, and social history was reviewed in Epic.   Lab results are reviewed with the patient today.  CBC unremarkable.  Fasting glucose 104.  Renal function normal.  Liver function normal.  Nutrition proteins look good.  Iron level is at goal with daily iron supplement.  LDL is 82.  Vitamin D is at goal .  Vitamin B-12 above goal with two times weekly oral B-12.     Review of Systems   Constitutional: Negative for chills, fatigue and fever.   HENT: Positive for postnasal drip.  "Negative for congestion, ear pain, sinus pressure and sore throat.    Respiratory: Positive for cough. Negative for shortness of breath and wheezing.    Cardiovascular: Negative for chest pain, palpitations and leg swelling.   Gastrointestinal: Negative for abdominal pain, blood in stool, constipation, diarrhea, nausea and vomiting.   Endocrine: Negative for cold intolerance, heat intolerance, polydipsia and polyuria.   Genitourinary: Negative for dysuria, frequency, hematuria and urgency.   Skin: Negative for rash.   Neurological: Negative for syncope and weakness.        Objective     Vitals:    11/17/17 1113   BP: 160/68   Pulse: 64   Temp: 98.1 °F (36.7 °C)   TempSrc: Oral   SpO2: 98%   Weight: 146 lb (66.2 kg)   Height: 63\" (160 cm)     Physical Exam   Constitutional: She is oriented to person, place, and time. She appears well-developed and well-nourished. No distress.   HENT:   Head: Normocephalic and atraumatic.   Nose: Right sinus exhibits no maxillary sinus tenderness and no frontal sinus tenderness. Left sinus exhibits no maxillary sinus tenderness and no frontal sinus tenderness.   Mouth/Throat: Uvula is midline, oropharynx is clear and moist and mucous membranes are normal. No oral lesions. No tonsillar exudate.   Eyes: Conjunctivae and EOM are normal. Pupils are equal, round, and reactive to light.   Neck: Trachea normal. Neck supple. No JVD present. Carotid bruit is not present. No tracheal deviation present. No thyroid mass and no thyromegaly present.   Turbulence over carotids, but no bruit appreciated.   Cardiovascular: Normal rate, regular rhythm, normal heart sounds and intact distal pulses.   No extrasystoles are present. PMI is not displaced.    No murmur heard.  Defibrillator noted below the left clavicle   Pulmonary/Chest: Effort normal and breath sounds normal. No accessory muscle usage. No respiratory distress. She has no decreased breath sounds. She has no wheezes. She has no rhonchi. She " has no rales.   Chronic lung sounds,   Abdominal: Soft. Bowel sounds are normal. She exhibits no distension. There is no hepatosplenomegaly. There is no tenderness.       Vascular Status -  Her exam exhibits right foot vasculature normal. Her exam exhibits no right foot edema. Her exam exhibits left foot vasculature normal. Her exam exhibits no left foot edema.  Lymphadenopathy:     She has no cervical adenopathy.   Neurological: She is alert and oriented to person, place, and time. No cranial nerve deficit. Coordination normal.   Skin: Skin is warm, dry and intact. No rash noted. No cyanosis. Nails show no clubbing.   Psychiatric: She has a normal mood and affect. Her speech is normal and behavior is normal. Thought content normal.   Vitals reviewed.      Assessment/Plan      She will stop the phenylephrine and start Flonase nasal spray one spray each nostril b.i.d.       She will continue the current antihypertensive regimen.  Continue to monitor after stopping the decongestant (phenylephrine).  Notify me if still above goal, and I would increase the hydralazine.    Decrease her iron supplement (Poly-Iron)  to three days weekly.      Continue with calcium and vitamin D supplements.  I again recommended she start the Prolia injections every six months.  She reluctantly agrees.      She can decrease vitamin B-12 to 5000 units once weekly.      Continue other medications and vitamin and mineral supplements to treat additional medical problems which we addressed today.  She will return in six months for follow up with fasting labs one week prior.       Scribed for Dr. Causey by Erin Cavazos [facility].     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    Panlobular emphysema    Acute exacerbation of chronic obstructive airways disease    Vitamin D deficiency  -     Vitamin D 25 Hydroxy; Future    Seizure disorder  -     Phenytoin (Dilantin), Serum; Future    Age-related osteoporosis without current pathological fracture    Dietary vitamin B12 deficiency anemia  -     Vitamin B12; Future    Iron deficiency anemia secondary to inadequate dietary iron intake  -     Ferritin; Future    History of sustained ventricular tachycardia    Recurrent major depressive disorder, in partial remission    Hypomagnesemia  -     Magnesium; Future    Mixed hyperlipidemia  -     Vitamin B12; Future  -     CBC Auto Differential; Future  -     Comprehensive Metabolic Panel; Future  -     Ferritin; Future  -     Lipid Panel; Future  -     Magnesium; Future  -     Phenytoin (Dilantin), Serum; Future  -     TSH; Future  -     Vitamin D 25 Hydroxy; Future  -     US Carotid Bilateral; Future    Other orders  -     Discontinue: PHENYLEPHRINE HCL PO; Take  by mouth.  -     fluticasone (FLONASE) 50 MCG/ACT nasal spray; 1 spray into each nostril 2 (Two) Times a Day. Administer 1 spray in each nostril twice daily.  -     iron polysaccharides (POLY-IRON 150) 150 MG capsule; Take 1 capsule by mouth Every Other Day.      Lab on 11/08/2017   Component Date Value Ref Range Status   • Vitamin B-12 11/08/2017 >1000* 239 - 931 pg/mL Final   • WBC 11/08/2017 5.97  3.20 - 9.80 10*3/mm3 Final   • RBC 11/08/2017 4.12  3.77 - 5.16 10*6/mm3 Final   • Hemoglobin 11/08/2017 13.5  12.0 - 15.5 g/dL Final   • Hematocrit 11/08/2017 40.3  35.0 - 45.0 % Final   • MCV 11/08/2017 97.8  80.0 - 98.0 fL Final   • MCH 11/08/2017 32.8  26.5 - 34.0 pg Final   • MCHC 11/08/2017 33.5  31.4 - 36.0 g/dL Final   • RDW  11/08/2017 12.2  11.5 - 14.5 % Final   • RDW-SD 11/08/2017 41.4  36.4 - 46.3 fl Final   • MPV 11/08/2017 9.4  8.0 - 12.0 fL Final   • Platelets 11/08/2017 203  150 - 450 10*3/mm3 Final   • Neutrophil % 11/08/2017 63.3  37.0 - 80.0 % Final   • Lymphocyte % 11/08/2017 24.1  10.0 - 50.0 % Final   • Monocyte % 11/08/2017 10.1  0.0 - 12.0 % Final   • Eosinophil % 11/08/2017 2.0  0.0 - 7.0 % Final   • Basophil % 11/08/2017 0.5  0.0 - 2.0 % Final   • Neutrophils, Absolute 11/08/2017 3.78  2.00 - 8.60 10*3/mm3 Final   • Lymphocytes, Absolute 11/08/2017 1.44  0.60 - 4.20 10*3/mm3 Final   • Monocytes, Absolute 11/08/2017 0.60  0.00 - 0.90 10*3/mm3 Final   • Eosinophils, Absolute 11/08/2017 0.12  0.00 - 0.70 10*3/mm3 Final   • Basophils, Absolute 11/08/2017 0.03  0.00 - 0.20 10*3/mm3 Final   • Glucose 11/08/2017 104* 70 - 100 mg/dL Final   • BUN 11/08/2017 20  8 - 25 mg/dL Final   • Creatinine 11/08/2017 0.90  0.40 - 1.30 mg/dL Final   • Sodium 11/08/2017 142  134 - 146 mmol/L Final   • Potassium 11/08/2017 4.4  3.4 - 5.4 mmol/L Final   • Chloride 11/08/2017 108  100 - 112 mmol/L Final   • CO2 11/08/2017 23.0  20.0 - 32.0 mmol/L Final   • Calcium 11/08/2017 9.2  8.4 - 10.8 mg/dL Final   • Total Protein 11/08/2017 7.1  6.7 - 8.2 g/dL Final   • Albumin 11/08/2017 4.20  3.20 - 5.50 g/dL Final   • ALT (SGPT) 11/08/2017 12  10 - 60 U/L Final   • AST (SGOT) 11/08/2017 20  10 - 60 U/L Final   • Alkaline Phosphatase 11/08/2017 30  15 - 121 U/L Final   • Total Bilirubin 11/08/2017 0.9  0.2 - 1.0 mg/dL Final   • eGFR Non African Amer 11/08/2017 60  39 - 90 mL/min/1.73 Final   • Globulin 11/08/2017 2.9  2.5 - 4.6 gm/dL Final   • A/G Ratio 11/08/2017 1.4  1.0 - 3.0 g/dL Final   • BUN/Creatinine Ratio 11/08/2017 22.2  7.0 - 25.0 Final   • Anion Gap 11/08/2017 11.0  5.0 - 15.0 mmol/L Final   • LDL Cholesterol  11/08/2017 82  0 - 129 mg/dL Final   • 25 Hydroxy, Vitamin D 11/08/2017 43.5  30.0 - 100.0 ng/ml Final   • Ferritin 11/08/2017 41.30   11.10 - 264.00 ng/mL Final   • Phenytoin Level 11/08/2017 12.5  10.0 - 20.0 mcg/mL Final   ]

## 2017-12-11 ENCOUNTER — CLINICAL SUPPORT (OUTPATIENT)
Dept: FAMILY MEDICINE CLINIC | Facility: CLINIC | Age: 82
End: 2017-12-11

## 2017-12-11 DIAGNOSIS — M81.0 AGE-RELATED OSTEOPOROSIS WITHOUT CURRENT PATHOLOGICAL FRACTURE: Primary | Chronic | ICD-10-CM

## 2017-12-11 PROCEDURE — 96372 THER/PROPH/DIAG INJ SC/IM: CPT | Performed by: INTERNAL MEDICINE

## 2017-12-18 RX ORDER — LEVETIRACETAM 500 MG/1
TABLET, EXTENDED RELEASE ORAL
Qty: 180 TABLET | Refills: 11 | Status: SHIPPED | OUTPATIENT
Start: 2017-12-18 | End: 2018-12-20 | Stop reason: SDUPTHER

## 2018-01-09 RX ORDER — AMLODIPINE BESYLATE 10 MG/1
TABLET ORAL
Qty: 30 TABLET | Refills: 5 | Status: SHIPPED | OUTPATIENT
Start: 2018-01-09 | End: 2018-06-08 | Stop reason: SDUPTHER

## 2018-04-10 RX ORDER — SOTALOL HYDROCHLORIDE 80 MG/1
TABLET ORAL
Qty: 60 TABLET | Refills: 11 | Status: SHIPPED | OUTPATIENT
Start: 2018-04-10 | End: 2019-04-23 | Stop reason: SDUPTHER

## 2018-04-10 RX ORDER — CLOPIDOGREL BISULFATE 75 MG/1
TABLET ORAL
Qty: 30 TABLET | Refills: 11 | Status: SHIPPED | OUTPATIENT
Start: 2018-04-10 | End: 2019-03-14 | Stop reason: SDUPTHER

## 2018-05-01 RX ORDER — VENLAFAXINE HYDROCHLORIDE 75 MG/1
CAPSULE, EXTENDED RELEASE ORAL
Qty: 30 CAPSULE | Refills: 11 | Status: SHIPPED | OUTPATIENT
Start: 2018-05-01 | End: 2019-04-29 | Stop reason: SDUPTHER

## 2018-05-07 ENCOUNTER — RESULTS ENCOUNTER (OUTPATIENT)
Dept: FAMILY MEDICINE CLINIC | Facility: CLINIC | Age: 83
End: 2018-05-07

## 2018-05-07 DIAGNOSIS — E78.2 MIXED HYPERLIPIDEMIA: Chronic | ICD-10-CM

## 2018-05-07 DIAGNOSIS — G40.909 SEIZURE DISORDER (HCC): Chronic | ICD-10-CM

## 2018-05-07 DIAGNOSIS — I10 ESSENTIAL HYPERTENSION: Chronic | ICD-10-CM

## 2018-05-14 RX ORDER — PHENYTOIN SODIUM 100 MG/1
300 CAPSULE, EXTENDED RELEASE ORAL NIGHTLY
Qty: 90 CAPSULE | Refills: 11 | Status: SHIPPED | OUTPATIENT
Start: 2018-05-14 | End: 2019-05-20 | Stop reason: SDUPTHER

## 2018-05-15 ENCOUNTER — LAB (OUTPATIENT)
Dept: LAB | Facility: OTHER | Age: 83
End: 2018-05-15

## 2018-05-15 DIAGNOSIS — D50.8 IRON DEFICIENCY ANEMIA SECONDARY TO INADEQUATE DIETARY IRON INTAKE: Chronic | ICD-10-CM

## 2018-05-15 DIAGNOSIS — D51.8 DIETARY VITAMIN B12 DEFICIENCY ANEMIA: Chronic | ICD-10-CM

## 2018-05-15 DIAGNOSIS — E83.42 HYPOMAGNESEMIA: Chronic | ICD-10-CM

## 2018-05-15 DIAGNOSIS — E78.2 MIXED HYPERLIPIDEMIA: Chronic | ICD-10-CM

## 2018-05-15 DIAGNOSIS — I10 ESSENTIAL HYPERTENSION: Chronic | ICD-10-CM

## 2018-05-15 DIAGNOSIS — G40.909 SEIZURE DISORDER (HCC): Primary | Chronic | ICD-10-CM

## 2018-05-15 DIAGNOSIS — E55.9 VITAMIN D DEFICIENCY: Chronic | ICD-10-CM

## 2018-05-15 DIAGNOSIS — G40.909 SEIZURE DISORDER (HCC): Chronic | ICD-10-CM

## 2018-05-15 LAB
25(OH)D3 SERPL-MCNC: 41.2 NG/ML (ref 30–100)
ALBUMIN SERPL-MCNC: 3.9 G/DL (ref 3.2–5.5)
ALBUMIN/GLOB SERPL: 1.5 G/DL (ref 1–3)
ALP SERPL-CCNC: 29 U/L (ref 15–121)
ALT SERPL W P-5'-P-CCNC: 12 U/L (ref 10–60)
ANION GAP SERPL CALCULATED.3IONS-SCNC: 8 MMOL/L (ref 5–15)
AST SERPL-CCNC: 19 U/L (ref 10–60)
BASOPHILS # BLD AUTO: 0.04 10*3/MM3 (ref 0–0.2)
BASOPHILS NFR BLD AUTO: 1 % (ref 0–2)
BILIRUB SERPL-MCNC: 0.5 MG/DL (ref 0.2–1)
BUN BLD-MCNC: 18 MG/DL (ref 8–25)
BUN/CREAT SERPL: 18 (ref 7–25)
CALCIUM SPEC-SCNC: 9.1 MG/DL (ref 8.4–10.8)
CHLORIDE SERPL-SCNC: 108 MMOL/L (ref 100–112)
CHOLEST SERPL-MCNC: 191 MG/DL (ref 150–200)
CO2 SERPL-SCNC: 24 MMOL/L (ref 20–32)
CREAT BLD-MCNC: 1 MG/DL (ref 0.4–1.3)
DEPRECATED RDW RBC AUTO: 41.7 FL (ref 36.4–46.3)
EOSINOPHIL # BLD AUTO: 0.11 10*3/MM3 (ref 0–0.7)
EOSINOPHIL NFR BLD AUTO: 2.7 % (ref 0–7)
ERYTHROCYTE [DISTWIDTH] IN BLOOD BY AUTOMATED COUNT: 12.1 % (ref 11.5–14.5)
FERRITIN SERPL-MCNC: 24.7 NG/ML (ref 11.1–264)
GFR SERPL CREATININE-BSD FRML MDRD: 53 ML/MIN/1.73 (ref 39–90)
GLOBULIN UR ELPH-MCNC: 2.6 GM/DL (ref 2.5–4.6)
GLUCOSE BLD-MCNC: 109 MG/DL (ref 70–100)
HCT VFR BLD AUTO: 38.9 % (ref 35–45)
HDLC SERPL-MCNC: 85 MG/DL (ref 35–100)
HGB BLD-MCNC: 13 G/DL (ref 12–15.5)
LDLC SERPL CALC-MCNC: 81 MG/DL
LDLC/HDLC SERPL: 0.95 {RATIO}
LYMPHOCYTES # BLD AUTO: 0.87 10*3/MM3 (ref 0.6–4.2)
LYMPHOCYTES NFR BLD AUTO: 21.4 % (ref 10–50)
MAGNESIUM SERPL-MCNC: 2.1 MG/DL (ref 1.8–2.5)
MCH RBC QN AUTO: 32.5 PG (ref 26.5–34)
MCHC RBC AUTO-ENTMCNC: 33.4 G/DL (ref 31.4–36)
MCV RBC AUTO: 97.3 FL (ref 80–98)
MONOCYTES # BLD AUTO: 0.46 10*3/MM3 (ref 0–0.9)
MONOCYTES NFR BLD AUTO: 11.3 % (ref 0–12)
NEUTROPHILS # BLD AUTO: 2.58 10*3/MM3 (ref 2–8.6)
NEUTROPHILS NFR BLD AUTO: 63.6 % (ref 37–80)
PHENYTOIN SERPL-MCNC: 9.3 MCG/ML (ref 10–20)
PLATELET # BLD AUTO: 189 10*3/MM3 (ref 150–450)
PMV BLD AUTO: 9.2 FL (ref 8–12)
POTASSIUM BLD-SCNC: 4.6 MMOL/L (ref 3.4–5.4)
PROT SERPL-MCNC: 6.5 G/DL (ref 6.7–8.2)
RBC # BLD AUTO: 4 10*6/MM3 (ref 3.77–5.16)
SODIUM BLD-SCNC: 140 MMOL/L (ref 134–146)
TRIGL SERPL-MCNC: 126 MG/DL (ref 35–160)
TSH SERPL DL<=0.05 MIU/L-ACNC: 3.57 MIU/ML (ref 0.46–4.68)
VIT B12 BLD-MCNC: 852 PG/ML (ref 239–931)
VLDLC SERPL-MCNC: 25.2 MG/DL
WBC NRBC COR # BLD: 4.06 10*3/MM3 (ref 3.2–9.8)

## 2018-05-15 PROCEDURE — 80185 ASSAY OF PHENYTOIN TOTAL: CPT | Performed by: INTERNAL MEDICINE

## 2018-05-15 PROCEDURE — 36415 COLL VENOUS BLD VENIPUNCTURE: CPT | Performed by: INTERNAL MEDICINE

## 2018-05-15 PROCEDURE — 84443 ASSAY THYROID STIM HORMONE: CPT | Performed by: INTERNAL MEDICINE

## 2018-05-15 PROCEDURE — 80053 COMPREHEN METABOLIC PANEL: CPT | Performed by: INTERNAL MEDICINE

## 2018-05-15 PROCEDURE — 83735 ASSAY OF MAGNESIUM: CPT | Performed by: INTERNAL MEDICINE

## 2018-05-15 PROCEDURE — 82607 VITAMIN B-12: CPT | Performed by: INTERNAL MEDICINE

## 2018-05-15 PROCEDURE — 82306 VITAMIN D 25 HYDROXY: CPT | Performed by: INTERNAL MEDICINE

## 2018-05-15 PROCEDURE — 85025 COMPLETE CBC W/AUTO DIFF WBC: CPT | Performed by: INTERNAL MEDICINE

## 2018-05-15 PROCEDURE — 80061 LIPID PANEL: CPT | Performed by: INTERNAL MEDICINE

## 2018-05-15 PROCEDURE — 82728 ASSAY OF FERRITIN: CPT | Performed by: INTERNAL MEDICINE

## 2018-05-22 RX ORDER — HYDRALAZINE HYDROCHLORIDE 25 MG/1
25 TABLET, FILM COATED ORAL 3 TIMES DAILY
Qty: 90 TABLET | Refills: 0 | Status: SHIPPED | OUTPATIENT
Start: 2018-05-22 | End: 2018-06-18 | Stop reason: SDUPTHER

## 2018-05-22 RX ORDER — ENALAPRIL MALEATE 20 MG/1
TABLET ORAL
Qty: 60 TABLET | Refills: 0 | Status: SHIPPED | OUTPATIENT
Start: 2018-05-22 | End: 2018-06-18 | Stop reason: SDUPTHER

## 2018-06-08 ENCOUNTER — OFFICE VISIT (OUTPATIENT)
Dept: FAMILY MEDICINE CLINIC | Facility: CLINIC | Age: 83
End: 2018-06-08

## 2018-06-08 VITALS
SYSTOLIC BLOOD PRESSURE: 176 MMHG | DIASTOLIC BLOOD PRESSURE: 80 MMHG | WEIGHT: 150 LBS | HEIGHT: 63 IN | BODY MASS INDEX: 26.58 KG/M2 | OXYGEN SATURATION: 95 % | HEART RATE: 62 BPM

## 2018-06-08 DIAGNOSIS — J43.1 PANLOBULAR EMPHYSEMA (HCC): Chronic | ICD-10-CM

## 2018-06-08 DIAGNOSIS — I10 ESSENTIAL HYPERTENSION: Chronic | ICD-10-CM

## 2018-06-08 DIAGNOSIS — R29.6 FALL IN ELDERLY PATIENT: Chronic | ICD-10-CM

## 2018-06-08 DIAGNOSIS — E83.42 HYPOMAGNESEMIA: Chronic | ICD-10-CM

## 2018-06-08 DIAGNOSIS — M81.0 AGE-RELATED OSTEOPOROSIS WITHOUT CURRENT PATHOLOGICAL FRACTURE: Chronic | ICD-10-CM

## 2018-06-08 DIAGNOSIS — J30.1 CHRONIC SEASONAL ALLERGIC RHINITIS DUE TO POLLEN: Chronic | ICD-10-CM

## 2018-06-08 DIAGNOSIS — D50.8 IRON DEFICIENCY ANEMIA SECONDARY TO INADEQUATE DIETARY IRON INTAKE: Chronic | ICD-10-CM

## 2018-06-08 DIAGNOSIS — F33.41 RECURRENT MAJOR DEPRESSIVE DISORDER, IN PARTIAL REMISSION (HCC): Chronic | ICD-10-CM

## 2018-06-08 DIAGNOSIS — Z86.79 HISTORY OF SUSTAINED VENTRICULAR TACHYCARDIA: Chronic | ICD-10-CM

## 2018-06-08 DIAGNOSIS — D51.8 DIETARY VITAMIN B12 DEFICIENCY ANEMIA: Chronic | ICD-10-CM

## 2018-06-08 DIAGNOSIS — G40.909 SEIZURE DISORDER (HCC): Chronic | ICD-10-CM

## 2018-06-08 DIAGNOSIS — E78.2 MIXED HYPERLIPIDEMIA: Primary | Chronic | ICD-10-CM

## 2018-06-08 DIAGNOSIS — E55.9 VITAMIN D DEFICIENCY: Chronic | ICD-10-CM

## 2018-06-08 PROCEDURE — 99214 OFFICE O/P EST MOD 30 MIN: CPT | Performed by: INTERNAL MEDICINE

## 2018-06-08 RX ORDER — AMLODIPINE BESYLATE 5 MG/1
5 TABLET ORAL 2 TIMES DAILY
Qty: 60 TABLET | Refills: 11 | Status: SHIPPED | OUTPATIENT
Start: 2018-06-08 | End: 2019-06-11 | Stop reason: SDUPTHER

## 2018-06-08 RX ORDER — AMLODIPINE BESYLATE 5 MG/1
5 TABLET ORAL DAILY
Qty: 30 TABLET | Refills: 11 | Status: SHIPPED | OUTPATIENT
Start: 2018-06-08 | End: 2018-06-08 | Stop reason: SDUPTHER

## 2018-06-08 NOTE — PROGRESS NOTES
Subjective     History of Present Illness     Neisha Duran is a 83 y.o. Female who presents for 6-month follow up on multiple complex issues including extensive vascular disease, hyperlipidemia, hypertension, carotid artery stenosis, COPD, and iron deficiency anemia among many other issues. She has a defibrillator/packmaker. She reports some shortness of breath with walking short distances, likely multifactorial in etiology.  She denies any recent seizure activity.      She reports a recent fall when she fell on the carport after hanging her shoe on a chair.  She sustained only minor skin abrasions without seriuos injury.  I encouraged the patient to ambulate with a cane.          She reports dreaming about people who have already passed away.  She denies the dreams are disturbing.  If dreams become disturbing, I asked her to notify me so   She denies auditory or visual hallucinations.        Six months ago, I had her decrease her iron supplement (Poly-Iron)  to three days weekly.  Iron level has trended down, but remains within normal range.    Repeat DEXA 07/2017 reveals normal density in the lumbar spine, but persistent osteoporosis of the left hip.  She continues the calcium and vitamin D supplements.  She continues to have Prolia injections every 6 months.  She will schedule her next injection next month.    She decreased vitamin B-12 to 5000 units once weekly.  Vitamin B-12 is at goal at 852.   Vitamin D at goal with current oral supplement.     Weight is up 4 pounds in the past six months.  Systolic blood pressure is above goal.         The patient's relevant past medical, surgical, and social history was reviewed in Epic.   Lab results are reviewed with the patient today.  CBC unremarkable.  Total cholesterol 191. HDL 85.  LDL 81.  Triglycerides 126.   Renal and liver function normal.  Dilantin level is slightly below goal at 9.3.      Review of Systems   Constitutional: Negative for chills, fatigue and  "fever.   HENT: Negative for congestion, ear pain, postnasal drip, sinus pressure and sore throat.    Respiratory: Negative for cough, shortness of breath and wheezing.    Cardiovascular: Negative for chest pain, palpitations and leg swelling.   Gastrointestinal: Negative for abdominal pain, blood in stool, constipation, diarrhea, nausea and vomiting.   Endocrine: Negative for cold intolerance, heat intolerance, polydipsia and polyuria.   Genitourinary: Negative for dysuria, frequency, hematuria and urgency.   Skin: Negative for rash.   Neurological: Negative for syncope and weakness.        Objective     Vitals:    06/08/18 1504   BP: 176/80   Pulse: 62   SpO2: 95%   Weight: 68 kg (150 lb)   Height: 160 cm (63\")     Physical Exam   Constitutional: She is oriented to person, place, and time. She appears well-developed and well-nourished. No distress.   Accompanied by her daughter, Vangie.    HENT:   Head: Normocephalic and atraumatic.   Nose: Right sinus exhibits no maxillary sinus tenderness and no frontal sinus tenderness. Left sinus exhibits no maxillary sinus tenderness and no frontal sinus tenderness.   Mouth/Throat: Uvula is midline, oropharynx is clear and moist and mucous membranes are normal. No oral lesions. No tonsillar exudate.   Eyes: Conjunctivae and EOM are normal. Pupils are equal, round, and reactive to light.   Neck: Trachea normal. Neck supple. No JVD present. Carotid bruit is not present. No tracheal deviation present. No thyroid mass and no thyromegaly present.   Turbulence over carotids, but no bruit appreciated.    Cardiovascular: Normal rate, regular rhythm, normal heart sounds and intact distal pulses.   No extrasystoles are present. PMI is not displaced.    No murmur heard.  Defibrillator noted below the left clavicle    Pulmonary/Chest: Effort normal and breath sounds normal. No accessory muscle usage. No respiratory distress. She has no decreased breath sounds. She has no wheezes. She has " no rhonchi. She has no rales.   Chronic lung sounds.    Abdominal: Soft. Bowel sounds are normal. She exhibits no distension. There is no hepatosplenomegaly. There is no tenderness.     Vascular Status -  Her right foot exhibits abnormal foot vasculature  and abnormal foot edema (Trace edema bilateral ankles with diminished pulses bilateral ankles). Her left foot exhibits abnormal foot vasculature  and abnormal foot edema.  Lymphadenopathy:     She has no cervical adenopathy.   Neurological: She is alert and oriented to person, place, and time. No cranial nerve deficit. Coordination normal.   Skin: Skin is warm, dry and intact. No rash noted. No cyanosis. Nails show no clubbing.   Psychiatric: She has a normal mood and affect. Her speech is normal and behavior is normal. Judgment and thought content normal.   Vitals reviewed.        Assessment/Plan      She has been taking Norvasc 10 mg 1/2 tablet twice daily.  A refill is sent for her Norvasc to take 5 mg b.i.d. , which will prevent the need to split the tablets.  She will continue the current antihypertensive regimen.  Continue to monitor blood pressure and notify me if not at goal.  Blood pressure is above goal today, but usually better at home.    She continues on simvastatin with good results.  I considered changing to Lipitor due to the patient also being on Norvasc, but she has tolerated the current doses of Norvasc and simvastatin well without difficulty, so no changes made today.    Continue with calcium and vitamin D supplements. She will schedule her Prolia injection next month.  We will repeat DEXA next summer 07/2019.     Continue vitamin B-12 to 5000 units once weekly.    Continue the Dilantin.    Continue with oral iron supplement three days weekly.        Continue other medications and vitamin and mineral supplements to treat additional medical problems which we addressed today.      She will return in six months for follow up with fasting labs one  week prior.         cribed for Dr. Causey by Erin Cavazos [facility].     Diagnoses and all orders for this visit:    Mixed hyperlipidemia  -     LDL Cholesterol, Direct; Future    Essential hypertension  -     CBC Auto Differential; Future  -     Comprehensive Metabolic Panel; Future    Panlobular emphysema    Chronic seasonal allergic rhinitis due to pollen    Vitamin D deficiency  -     Vitamin D 25 Hydroxy; Future    Seizure disorder  -     Phenytoin Level, Total; Future    Age-related osteoporosis without current pathological fracture    Iron deficiency anemia secondary to inadequate dietary iron intake  -     Ferritin; Future    Dietary vitamin B12 deficiency anemia  -     Vitamin B12; Future    Hypomagnesemia  -     Magnesium; Future    History of sustained ventricular tachycardia - S/P ICD placement    Recurrent major depressive disorder, in partial remission    Fall in elderly patient    Other orders  -     Discontinue: amLODIPine (NORVASC) 5 MG tablet; Take 1 tablet by mouth Daily.  -     amLODIPine (NORVASC) 5 MG tablet; Take 1 tablet by mouth 2 (Two) Times a Day.        No visits with results within 3 Week(s) from this visit.   Latest known visit with results is:   Lab on 05/15/2018   Component Date Value Ref Range Status   • Vitamin B-12 05/15/2018 852  239 - 931 pg/mL Final   • WBC 05/15/2018 4.06  3.20 - 9.80 10*3/mm3 Final   • RBC 05/15/2018 4.00  3.77 - 5.16 10*6/mm3 Final   • Hemoglobin 05/15/2018 13.0  12.0 - 15.5 g/dL Final   • Hematocrit 05/15/2018 38.9  35.0 - 45.0 % Final   • MCV 05/15/2018 97.3  80.0 - 98.0 fL Final   • MCH 05/15/2018 32.5  26.5 - 34.0 pg Final   • MCHC 05/15/2018 33.4  31.4 - 36.0 g/dL Final   • RDW 05/15/2018 12.1  11.5 - 14.5 % Final   • RDW-SD 05/15/2018 41.7  36.4 - 46.3 fl Final   • MPV 05/15/2018 9.2  8.0 - 12.0 fL Final   • Platelets 05/15/2018 189  150 - 450 10*3/mm3 Final   • Neutrophil % 05/15/2018 63.6  37.0 - 80.0 % Final   • Lymphocyte % 05/15/2018 21.4  10.0 -  50.0 % Final   • Monocyte % 05/15/2018 11.3  0.0 - 12.0 % Final   • Eosinophil % 05/15/2018 2.7  0.0 - 7.0 % Final   • Basophil % 05/15/2018 1.0  0.0 - 2.0 % Final   • Neutrophils, Absolute 05/15/2018 2.58  2.00 - 8.60 10*3/mm3 Final   • Lymphocytes, Absolute 05/15/2018 0.87  0.60 - 4.20 10*3/mm3 Final   • Monocytes, Absolute 05/15/2018 0.46  0.00 - 0.90 10*3/mm3 Final   • Eosinophils, Absolute 05/15/2018 0.11  0.00 - 0.70 10*3/mm3 Final   • Basophils, Absolute 05/15/2018 0.04  0.00 - 0.20 10*3/mm3 Final   • Glucose 05/15/2018 109* 70 - 100 mg/dL Final   • BUN 05/15/2018 18  8 - 25 mg/dL Final   • Creatinine 05/15/2018 1.00  0.40 - 1.30 mg/dL Final   • Sodium 05/15/2018 140  134 - 146 mmol/L Final   • Potassium 05/15/2018 4.6  3.4 - 5.4 mmol/L Final   • Chloride 05/15/2018 108  100 - 112 mmol/L Final   • CO2 05/15/2018 24.0  20.0 - 32.0 mmol/L Final   • Calcium 05/15/2018 9.1  8.4 - 10.8 mg/dL Final   • Total Protein 05/15/2018 6.5* 6.7 - 8.2 g/dL Final   • Albumin 05/15/2018 3.90  3.20 - 5.50 g/dL Final   • ALT (SGPT) 05/15/2018 12  10 - 60 U/L Final   • AST (SGOT) 05/15/2018 19  10 - 60 U/L Final   • Alkaline Phosphatase 05/15/2018 29  15 - 121 U/L Final   • Total Bilirubin 05/15/2018 0.5  0.2 - 1.0 mg/dL Final   • eGFR Non African Amer 05/15/2018 53  39 - 90 mL/min/1.73 Final   • Globulin 05/15/2018 2.6  2.5 - 4.6 gm/dL Final   • A/G Ratio 05/15/2018 1.5  1.0 - 3.0 g/dL Final   • BUN/Creatinine Ratio 05/15/2018 18.0  7.0 - 25.0 Final   • Anion Gap 05/15/2018 8.0  5.0 - 15.0 mmol/L Final   • Ferritin 05/15/2018 24.70  11.10 - 264.00 ng/mL Final   • Total Cholesterol 05/15/2018 191  150 - 200 mg/dL Final   • Triglycerides 05/15/2018 126  35 - 160 mg/dL Final   • HDL Cholesterol 05/15/2018 85  35 - 100 mg/dL Final   • LDL Cholesterol  05/15/2018 81  mg/dL Final   • VLDL Cholesterol 05/15/2018 25.2  mg/dL Final   • LDL/HDL Ratio 05/15/2018 0.95   Final   • Magnesium 05/15/2018 2.1  1.8 - 2.5 mg/dL Final   • TSH  05/15/2018 3.570  0.460 - 4.680 mIU/mL Final   • 25 Hydroxy, Vitamin D 05/15/2018 41.2  30.0 - 100.0 ng/ml Final   • Phenytoin Level 05/15/2018 9.3* 10.0 - 20.0 mcg/mL Final   ]

## 2018-06-11 RX ORDER — SIMVASTATIN 40 MG
TABLET ORAL
Qty: 90 TABLET | Refills: 3 | Status: SHIPPED | OUTPATIENT
Start: 2018-06-11 | End: 2019-06-10 | Stop reason: SDUPTHER

## 2018-06-19 RX ORDER — HYDRALAZINE HYDROCHLORIDE 25 MG/1
25 TABLET, FILM COATED ORAL 3 TIMES DAILY
Qty: 90 TABLET | Refills: 3 | Status: SHIPPED | OUTPATIENT
Start: 2018-06-19 | End: 2018-09-27 | Stop reason: SDUPTHER

## 2018-06-19 RX ORDER — ENALAPRIL MALEATE 20 MG/1
TABLET ORAL
Qty: 60 TABLET | Refills: 3 | Status: SHIPPED | OUTPATIENT
Start: 2018-06-19 | End: 2018-09-10 | Stop reason: SDUPTHER

## 2018-06-20 DIAGNOSIS — M81.0 AGE-RELATED OSTEOPOROSIS WITHOUT CURRENT PATHOLOGICAL FRACTURE: Primary | ICD-10-CM

## 2018-06-29 RX ORDER — ERGOCALCIFEROL 1.25 MG/1
CAPSULE ORAL
Qty: 4 CAPSULE | Refills: 3 | Status: SHIPPED | OUTPATIENT
Start: 2018-06-29 | End: 2018-10-08 | Stop reason: SDUPTHER

## 2018-06-29 RX ORDER — ERGOCALCIFEROL 1.25 MG/1
CAPSULE ORAL
Qty: 4 CAPSULE | Refills: 3 | OUTPATIENT
Start: 2018-06-29

## 2018-08-10 ENCOUNTER — INFUSION (OUTPATIENT)
Dept: ONCOLOGY | Facility: HOSPITAL | Age: 83
End: 2018-08-10

## 2018-08-10 DIAGNOSIS — M81.0 AGE-RELATED OSTEOPOROSIS WITHOUT CURRENT PATHOLOGICAL FRACTURE: Primary | ICD-10-CM

## 2018-08-10 LAB
CALCIUM SPEC-SCNC: 8.5 MG/DL (ref 8.4–10.2)
MAGNESIUM SERPL-MCNC: 2 MG/DL (ref 1.6–2.3)
PHOSPHATE SERPL-MCNC: 3.7 MG/DL (ref 2.4–4.4)

## 2018-08-10 PROCEDURE — 84100 ASSAY OF PHOSPHORUS: CPT | Performed by: NURSE PRACTITIONER

## 2018-08-10 PROCEDURE — 83735 ASSAY OF MAGNESIUM: CPT | Performed by: NURSE PRACTITIONER

## 2018-08-10 PROCEDURE — 36415 COLL VENOUS BLD VENIPUNCTURE: CPT | Performed by: NURSE PRACTITIONER

## 2018-08-10 PROCEDURE — 96372 THER/PROPH/DIAG INJ SC/IM: CPT | Performed by: NURSE PRACTITIONER

## 2018-08-10 PROCEDURE — 82310 ASSAY OF CALCIUM: CPT | Performed by: NURSE PRACTITIONER

## 2018-08-10 PROCEDURE — 25010000002 DENOSUMAB 60 MG/ML SOLUTION: Performed by: NURSE PRACTITIONER

## 2018-08-10 RX ADMIN — DENOSUMAB 60 MG: 60 INJECTION SUBCUTANEOUS at 14:20

## 2018-09-11 RX ORDER — ENALAPRIL MALEATE 20 MG/1
TABLET ORAL
Qty: 60 TABLET | Refills: 5 | Status: SHIPPED | OUTPATIENT
Start: 2018-09-11 | End: 2019-03-14 | Stop reason: SDUPTHER

## 2018-09-28 ENCOUNTER — OFFICE VISIT (OUTPATIENT)
Dept: FAMILY MEDICINE CLINIC | Facility: CLINIC | Age: 83
End: 2018-09-28

## 2018-09-28 VITALS
HEART RATE: 60 BPM | DIASTOLIC BLOOD PRESSURE: 60 MMHG | SYSTOLIC BLOOD PRESSURE: 158 MMHG | TEMPERATURE: 98.2 F | HEIGHT: 63 IN | BODY MASS INDEX: 26.51 KG/M2 | WEIGHT: 149.6 LBS

## 2018-09-28 DIAGNOSIS — M25.551 RIGHT HIP PAIN: ICD-10-CM

## 2018-09-28 DIAGNOSIS — M54.16 RIGHT LUMBAR RADICULOPATHY: Primary | ICD-10-CM

## 2018-09-28 DIAGNOSIS — M62.830 MUSCLE SPASM OF BACK: ICD-10-CM

## 2018-09-28 DIAGNOSIS — K52.1 DIARRHEA DUE TO DRUG: ICD-10-CM

## 2018-09-28 DIAGNOSIS — E83.42 HYPOMAGNESEMIA: Chronic | ICD-10-CM

## 2018-09-28 PROCEDURE — 96372 THER/PROPH/DIAG INJ SC/IM: CPT | Performed by: INTERNAL MEDICINE

## 2018-09-28 PROCEDURE — 99214 OFFICE O/P EST MOD 30 MIN: CPT | Performed by: INTERNAL MEDICINE

## 2018-09-28 RX ORDER — CELECOXIB 100 MG/1
100 CAPSULE ORAL 2 TIMES DAILY PRN
Qty: 30 CAPSULE | Refills: 0 | Status: SHIPPED | OUTPATIENT
Start: 2018-09-28 | End: 2018-09-28

## 2018-09-28 RX ORDER — IRON POLYSACCHARIDE COMPLEX 150 MG
CAPSULE ORAL
Qty: 60 CAPSULE | Refills: 11 | OUTPATIENT
Start: 2018-09-28

## 2018-09-28 RX ORDER — METHYLPREDNISOLONE ACETATE 80 MG/ML
80 INJECTION, SUSPENSION INTRA-ARTICULAR; INTRALESIONAL; INTRAMUSCULAR; SOFT TISSUE ONCE
Status: COMPLETED | OUTPATIENT
Start: 2018-09-28 | End: 2018-09-28

## 2018-09-28 RX ORDER — TRIAMCINOLONE ACETONIDE 40 MG/ML
20 INJECTION, SUSPENSION INTRA-ARTICULAR; INTRAMUSCULAR ONCE
Status: COMPLETED | OUTPATIENT
Start: 2018-09-28 | End: 2018-09-28

## 2018-09-28 RX ORDER — CELECOXIB 100 MG/1
100 CAPSULE ORAL DAILY PRN
Qty: 30 CAPSULE | Refills: 0 | Status: SHIPPED | OUTPATIENT
Start: 2018-09-28 | End: 2020-01-07

## 2018-09-28 RX ORDER — TIZANIDINE 4 MG/1
TABLET ORAL
Qty: 30 TABLET | Refills: 0 | Status: SHIPPED | OUTPATIENT
Start: 2018-09-28 | End: 2019-06-21

## 2018-09-28 RX ORDER — IRON POLYSACCHARIDE COMPLEX 150 MG
CAPSULE ORAL
Qty: 180 CAPSULE | Refills: 3 | Status: SHIPPED | OUTPATIENT
Start: 2018-09-28 | End: 2019-06-21 | Stop reason: SDUPTHER

## 2018-09-28 RX ORDER — HYDRALAZINE HYDROCHLORIDE 25 MG/1
25 TABLET, FILM COATED ORAL 3 TIMES DAILY
Qty: 90 TABLET | Refills: 3 | Status: SHIPPED | OUTPATIENT
Start: 2018-09-28 | End: 2019-01-29 | Stop reason: SDUPTHER

## 2018-09-28 RX ADMIN — METHYLPREDNISOLONE ACETATE 80 MG: 80 INJECTION, SUSPENSION INTRA-ARTICULAR; INTRALESIONAL; INTRAMUSCULAR; SOFT TISSUE at 11:49

## 2018-09-28 RX ADMIN — TRIAMCINOLONE ACETONIDE 20 MG: 40 INJECTION, SUSPENSION INTRA-ARTICULAR; INTRAMUSCULAR at 11:51

## 2018-09-28 NOTE — PROGRESS NOTES
"Subjective     Neishajacobo Duran is a 83 y.o. female.     History of Present Illness     Mrs. Duran is our very complex 83-year-old patient with numerous serious medical issues.  For the past 4-6 weeks, she has been experiencing low back pain radiating to the right buttock and occasionally radiating down the lateral aspect of the right lower extremity toward the knee.  Most of the pain is in the right buttock.  There is tenderness and inflammation in the right SI joint region.  There is muscle spasm in the lumbar paraspinal muscles.  She has had occasional falls, which may have aggravated her symptoms.  She denies any bowel or bladder control changes.  She reports that the pain is worse with transfers from seated to standing position.  She can get reasonably comfortable in bed at night, at least for a few hours.  She has occasionally taken an OTC Tylenol once daily, but has not tried any other analgesics.      She does report diarrhea due to her magnesium supplement.  She has decreased the mag oxide 400 mg every other day.  Her last magnesium level was 2.0.    Review of Systems   Constitutional: Negative for chills, fatigue and fever.   HENT: Negative for congestion, ear pain, postnasal drip, sinus pressure and sore throat.    Respiratory: Negative for cough, shortness of breath and wheezing.    Cardiovascular: Negative for chest pain, palpitations and leg swelling.   Gastrointestinal: Positive for diarrhea. Negative for abdominal pain, blood in stool, constipation, nausea and vomiting.   Endocrine: Negative for cold intolerance, heat intolerance, polydipsia and polyuria.   Genitourinary: Negative for dysuria, frequency, hematuria and urgency.   Musculoskeletal: Positive for back pain.   Skin: Negative for rash.   Neurological: Negative for syncope and weakness.       Objective     /60   Pulse 60   Temp 98.2 °F (36.8 °C) (Oral)   Ht 160 cm (63\")   Wt 67.9 kg (149 lb 9.6 oz)   BMI 26.50 kg/m²     Physical " Exam   Constitutional: She is oriented to person, place, and time. She appears well-developed and well-nourished. No distress.   Accompanied by her daughter, Onelia    HENT:   Head: Normocephalic and atraumatic.   Nose: Right sinus exhibits no maxillary sinus tenderness and no frontal sinus tenderness. Left sinus exhibits no maxillary sinus tenderness and no frontal sinus tenderness.   Mouth/Throat: Uvula is midline, oropharynx is clear and moist and mucous membranes are normal. No oral lesions. No tonsillar exudate.   Eyes: Pupils are equal, round, and reactive to light. Conjunctivae and EOM are normal.   Neck: Trachea normal. Neck supple. No JVD present. Carotid bruit is not present. No tracheal deviation present. No thyroid mass and no thyromegaly present.   Turbulence over carotids, but no bruit appreciated.    Cardiovascular: Normal rate, regular rhythm, normal heart sounds and intact distal pulses.   No extrasystoles are present. PMI is not displaced.    No murmur heard.  Defibrillator noted below the left clavicle    Pulmonary/Chest: Effort normal and breath sounds normal. No accessory muscle usage. No respiratory distress. She has no decreased breath sounds. She has no wheezes. She has no rhonchi. She has no rales.   Chronic lung sounds.    Abdominal: Soft. Bowel sounds are normal. She exhibits no distension. There is no hepatosplenomegaly. There is no tenderness.   Musculoskeletal:   Exam of the lumbar region reveals inflammation and tenderness of the right SI joint.  Increased paraspinal muscle spasm and tenderness, right more than left.  No vertebral tenderness noted.  Straight leg raises are deferred due to her frail condition and current level of discomfort.  Gait is painful with cautious.     Vascular Status -  Her right foot exhibits abnormal foot vasculature  and abnormal foot edema (Trace edema bilateral ankles with diminished pulses bilateral ankles). Her left foot exhibits abnormal foot  vasculature  and abnormal foot edema.  Lymphadenopathy:     She has no cervical adenopathy.   Neurological: She is alert and oriented to person, place, and time. No cranial nerve deficit. Coordination normal.   Skin: Skin is warm, dry and intact. No rash noted. No cyanosis. Nails show no clubbing.   Psychiatric: She has a normal mood and affect. Her speech is normal and behavior is normal. Judgment and thought content normal.   Vitals reviewed.      PHQ-2/PHQ-9 Depression Screening 11/17/2017   Little interest or pleasure in doing things 1   Feeling down, depressed, or hopeless 1   Trouble falling or staying asleep, or sleeping too much 0   Feeling tired or having little energy 1   Poor appetite or overeating 0   Feeling bad about yourself - or that you are a failure or have let yourself or your family down 0   Trouble concentrating on things, such as reading the newspaper or watching television 0   Moving or speaking so slowly that other people could have noticed. Or the opposite - being so fidgety or restless that you have been moving around a lot more than usual 0   Thoughts that you would be better off dead, or of hurting yourself in some way 0   Total Score 3   If you checked off any problems, how difficult have these problems made it for you to do your work, take care of things at home, or get along with other people? Not difficult at all       Assessment/Plan     We will get x-rays of the lumbar spine and right hip with pelvis.  My initial review of the lumbar x-rays reveals lumbar DJD.  Radiology report is pending.  The patient receives injection today with Kenalog 20 mg and Depo-Medrol 80 mg IM to the right buttock.  Start Celebrex 100 mg daily with food as needed.  Zanaflex 4 mg 1/2-1 tablet daily at bedtime.  Take Tylenol arthritis 2 tablets twice daily on a scheduled basis.  Fall precautions.  She may need to ambulate with a walker or cane.  She has been using a heating pad rather excessively to the  area.  We discussed how to appropriately use some alternating heat or cold, based on symptoms.    Try decreasing the mag oxide 400 mg one half tablet every other day to help maintain a reasonable magnesium level, but hopefully decrease her diarrhea issues.    Return to clinic sooner if she fails to improve.    Diagnoses and all orders for this visit:    Right lumbar radiculopathy  -     methylPREDNISolone acetate (DEPO-medrol) injection 80 mg; Inject 1 mL into the appropriate muscle as directed by prescriber 1 (One) Time.  -     triamcinolone acetonide (KENALOG-40) injection 20 mg; Inject 0.5 mL into the appropriate muscle as directed by prescriber 1 (One) Time.  -     XR Spine Lumbar 2 or 3 View  -     XR Hip With or Without Pelvis 2 - 3 View Right    Muscle spasm of back    Right hip pain  -     XR Hip With or Without Pelvis 2 - 3 View Right    Hypomagnesemia    Diarrhea due to drug    Other orders  -     tiZANidine (ZANAFLEX) 4 MG tablet; 1/2-1 qhs prn muscle spasms  -     Discontinue: celecoxib (CELEBREX) 100 MG capsule; Take 1 capsule by mouth 2 (Two) Times a Day As Needed for Mild Pain .  -     celecoxib (CELEBREX) 100 MG capsule; Take 1 capsule by mouth Daily As Needed.        No visits with results within 3 Week(s) from this visit.   Latest known visit with results is:   Infusion on 08/10/2018   Component Date Value Ref Range Status   • Calcium 08/10/2018 8.5  8.4 - 10.2 mg/dL Final   • Magnesium 08/10/2018 2.0  1.6 - 2.3 mg/dL Final   • Phosphorus 08/10/2018 3.7  2.4 - 4.4 mg/dL Final   ]

## 2018-10-08 RX ORDER — ERGOCALCIFEROL 1.25 MG/1
CAPSULE ORAL
Qty: 3 CAPSULE | Refills: 4 | Status: SHIPPED | OUTPATIENT
Start: 2018-10-08 | End: 2019-06-21

## 2018-10-26 RX ORDER — PANTOPRAZOLE SODIUM 40 MG/1
TABLET, DELAYED RELEASE ORAL
Qty: 60 TABLET | Refills: 11 | Status: SHIPPED | OUTPATIENT
Start: 2018-10-26 | End: 2019-10-28 | Stop reason: SDUPTHER

## 2018-12-07 ENCOUNTER — LAB (OUTPATIENT)
Dept: LAB | Facility: OTHER | Age: 83
End: 2018-12-07

## 2018-12-07 DIAGNOSIS — D51.8 DIETARY VITAMIN B12 DEFICIENCY ANEMIA: Chronic | ICD-10-CM

## 2018-12-07 DIAGNOSIS — E55.9 VITAMIN D DEFICIENCY: Chronic | ICD-10-CM

## 2018-12-07 DIAGNOSIS — E78.2 MIXED HYPERLIPIDEMIA: Chronic | ICD-10-CM

## 2018-12-07 DIAGNOSIS — E83.42 HYPOMAGNESEMIA: Chronic | ICD-10-CM

## 2018-12-07 DIAGNOSIS — D50.8 IRON DEFICIENCY ANEMIA SECONDARY TO INADEQUATE DIETARY IRON INTAKE: Chronic | ICD-10-CM

## 2018-12-07 DIAGNOSIS — I10 ESSENTIAL HYPERTENSION: Chronic | ICD-10-CM

## 2018-12-07 DIAGNOSIS — G40.909 SEIZURE DISORDER (HCC): Chronic | ICD-10-CM

## 2018-12-07 LAB
25(OH)D3 SERPL-MCNC: 48.5 NG/ML (ref 30–100)
ALBUMIN SERPL-MCNC: 4.5 G/DL (ref 3.5–5)
ALBUMIN/GLOB SERPL: 1.8 G/DL (ref 1.1–1.8)
ALP SERPL-CCNC: 36 U/L (ref 38–126)
ALT SERPL W P-5'-P-CCNC: 13 U/L
ANION GAP SERPL CALCULATED.3IONS-SCNC: 7 MMOL/L (ref 5–15)
ARTICHOKE IGE QN: 77 MG/DL (ref 1–129)
AST SERPL-CCNC: 21 U/L (ref 14–36)
BASOPHILS # BLD AUTO: 0.03 10*3/MM3 (ref 0–0.2)
BASOPHILS NFR BLD AUTO: 0.7 % (ref 0–2)
BILIRUB SERPL-MCNC: 0.5 MG/DL (ref 0.2–1.3)
BUN BLD-MCNC: 18 MG/DL (ref 7–17)
BUN/CREAT SERPL: 20.7 (ref 7–25)
CALCIUM SPEC-SCNC: 9.2 MG/DL (ref 8.4–10.2)
CHLORIDE SERPL-SCNC: 113 MMOL/L (ref 98–107)
CO2 SERPL-SCNC: 22 MMOL/L (ref 22–30)
CREAT BLD-MCNC: 0.87 MG/DL (ref 0.52–1.04)
DEPRECATED RDW RBC AUTO: 42.7 FL (ref 36.4–46.3)
EOSINOPHIL # BLD AUTO: 0.08 10*3/MM3 (ref 0–0.7)
EOSINOPHIL NFR BLD AUTO: 1.8 % (ref 0–7)
ERYTHROCYTE [DISTWIDTH] IN BLOOD BY AUTOMATED COUNT: 12.4 % (ref 11.5–14.5)
FERRITIN SERPL-MCNC: 30.3 NG/ML (ref 11.1–264)
GFR SERPL CREATININE-BSD FRML MDRD: 62 ML/MIN/1.73 (ref 39–90)
GLOBULIN UR ELPH-MCNC: 2.5 GM/DL (ref 2.3–3.5)
GLUCOSE BLD-MCNC: 115 MG/DL (ref 74–99)
HCT VFR BLD AUTO: 39.3 % (ref 35–45)
HGB BLD-MCNC: 13 G/DL (ref 12–15.5)
LYMPHOCYTES # BLD AUTO: 1.07 10*3/MM3 (ref 0.6–4.2)
LYMPHOCYTES NFR BLD AUTO: 24.4 % (ref 10–50)
MAGNESIUM SERPL-MCNC: 2.1 MG/DL (ref 1.6–2.3)
MCH RBC QN AUTO: 31.9 PG (ref 26.5–34)
MCHC RBC AUTO-ENTMCNC: 33.1 G/DL (ref 31.4–36)
MCV RBC AUTO: 96.6 FL (ref 80–98)
MONOCYTES # BLD AUTO: 0.37 10*3/MM3 (ref 0–0.9)
MONOCYTES NFR BLD AUTO: 8.4 % (ref 0–12)
NEUTROPHILS # BLD AUTO: 2.84 10*3/MM3 (ref 2–8.6)
NEUTROPHILS NFR BLD AUTO: 64.7 % (ref 37–80)
PHENYTOIN SERPL-MCNC: 14.5 MCG/ML (ref 10–20)
PLATELET # BLD AUTO: 209 10*3/MM3 (ref 150–450)
PMV BLD AUTO: 9.4 FL (ref 8–12)
POTASSIUM BLD-SCNC: 4.3 MMOL/L (ref 3.4–5)
PROT SERPL-MCNC: 7 G/DL (ref 6.3–8.2)
RBC # BLD AUTO: 4.07 10*6/MM3 (ref 3.77–5.16)
SODIUM BLD-SCNC: 142 MMOL/L (ref 137–145)
VIT B12 BLD-MCNC: 759 PG/ML (ref 239–931)
WBC NRBC COR # BLD: 4.39 10*3/MM3 (ref 3.2–9.8)

## 2018-12-07 PROCEDURE — 83735 ASSAY OF MAGNESIUM: CPT | Performed by: INTERNAL MEDICINE

## 2018-12-07 PROCEDURE — 80053 COMPREHEN METABOLIC PANEL: CPT | Performed by: INTERNAL MEDICINE

## 2018-12-07 PROCEDURE — 82728 ASSAY OF FERRITIN: CPT | Performed by: INTERNAL MEDICINE

## 2018-12-07 PROCEDURE — 36415 COLL VENOUS BLD VENIPUNCTURE: CPT | Performed by: INTERNAL MEDICINE

## 2018-12-07 PROCEDURE — 80185 ASSAY OF PHENYTOIN TOTAL: CPT | Performed by: INTERNAL MEDICINE

## 2018-12-07 PROCEDURE — 83721 ASSAY OF BLOOD LIPOPROTEIN: CPT | Performed by: INTERNAL MEDICINE

## 2018-12-07 PROCEDURE — 82306 VITAMIN D 25 HYDROXY: CPT | Performed by: INTERNAL MEDICINE

## 2018-12-07 PROCEDURE — 82607 VITAMIN B-12: CPT | Performed by: INTERNAL MEDICINE

## 2018-12-07 PROCEDURE — 85025 COMPLETE CBC W/AUTO DIFF WBC: CPT | Performed by: INTERNAL MEDICINE

## 2018-12-14 ENCOUNTER — OFFICE VISIT (OUTPATIENT)
Dept: FAMILY MEDICINE CLINIC | Facility: CLINIC | Age: 83
End: 2018-12-14

## 2018-12-14 VITALS
HEIGHT: 63 IN | RESPIRATION RATE: 16 BRPM | WEIGHT: 150 LBS | DIASTOLIC BLOOD PRESSURE: 70 MMHG | BODY MASS INDEX: 26.58 KG/M2 | SYSTOLIC BLOOD PRESSURE: 130 MMHG | HEART RATE: 62 BPM | TEMPERATURE: 98.4 F | OXYGEN SATURATION: 98 %

## 2018-12-14 DIAGNOSIS — Z86.79 HISTORY OF SUSTAINED VENTRICULAR TACHYCARDIA: Chronic | ICD-10-CM

## 2018-12-14 DIAGNOSIS — G40.909 SEIZURE DISORDER (HCC): Chronic | ICD-10-CM

## 2018-12-14 DIAGNOSIS — E83.42 HYPOMAGNESEMIA: Chronic | ICD-10-CM

## 2018-12-14 DIAGNOSIS — F33.41 RECURRENT MAJOR DEPRESSIVE DISORDER, IN PARTIAL REMISSION (HCC): Chronic | ICD-10-CM

## 2018-12-14 DIAGNOSIS — E55.9 VITAMIN D DEFICIENCY: Chronic | ICD-10-CM

## 2018-12-14 DIAGNOSIS — J43.1 PANLOBULAR EMPHYSEMA (HCC): Chronic | ICD-10-CM

## 2018-12-14 DIAGNOSIS — R73.01 IFG (IMPAIRED FASTING GLUCOSE): ICD-10-CM

## 2018-12-14 DIAGNOSIS — D51.8 DIETARY VITAMIN B12 DEFICIENCY ANEMIA: Chronic | ICD-10-CM

## 2018-12-14 DIAGNOSIS — D50.8 IRON DEFICIENCY ANEMIA SECONDARY TO INADEQUATE DIETARY IRON INTAKE: Chronic | ICD-10-CM

## 2018-12-14 DIAGNOSIS — E78.2 MIXED HYPERLIPIDEMIA: Chronic | ICD-10-CM

## 2018-12-14 DIAGNOSIS — R29.6 FALL IN ELDERLY PATIENT: Chronic | ICD-10-CM

## 2018-12-14 DIAGNOSIS — I25.5 ISCHEMIC CARDIOMYOPATHY: Chronic | ICD-10-CM

## 2018-12-14 DIAGNOSIS — I10 ESSENTIAL HYPERTENSION: Primary | Chronic | ICD-10-CM

## 2018-12-14 PROCEDURE — 90471 IMMUNIZATION ADMIN: CPT | Performed by: INTERNAL MEDICINE

## 2018-12-14 PROCEDURE — 99214 OFFICE O/P EST MOD 30 MIN: CPT | Performed by: INTERNAL MEDICINE

## 2018-12-14 PROCEDURE — 90670 PCV13 VACCINE IM: CPT | Performed by: INTERNAL MEDICINE

## 2018-12-14 NOTE — PROGRESS NOTES
Subjective        History of Present Illness     Neisha Duran is a 84 y.o. female who presents for 6-month follow up on multiple complex issues including extensive vascular disease, hyperlipidemia, hypertension, carotid artery stenosis, COPD, and iron deficiency anemia among many other issues. She has a defibrillator/pacemaker.   She has a history of seizure, but denies any recent seizure activity.   She feels her depression and anxiety is adequately managed with Effexor XR 75 mg daily.  GERD symptoms adequately controlled with Protonix.     Repeat DEXA 07/2017 reveals normal density in the lumbar spine, but persistent osteoporosis of the left hip.  She continues the calcium and vitamin D supplements.  She continues to have Prolia injections every 6 months with her next Prolia injection scheduled in February 2019.      She is requesting a disabled parking permit.  This is completed for patient today.  She is no longer operating a motor vehicle herself.     She declines influenza, feeling that it produced severe illness several years ago.  She has not had pneumonia vaccines, but agrees to have Prevnar today.  We will plan on giving Pneumovax in one year.       Weight is stable.  Blood pressure is at goal.      The patient's relevant past medical, surgical, and social history was reviewed in Epic.   Lab results are reviewed with the patient today.  CBC unremarkable.  Fasting glucose 115.  Renal and liver function normal.  Vitamin D and vitamin B-12 at goal.  LDL 77.   Normal electrolytes.  Magnesium is at goal with 1/2 tablet of  Mag-Ox q.o.d.  Iron is at goal with oral iron supplement three days weekly and B-12 is at goal with once weekly oral supplement.  Vitamin D at goal.        Review of Systems   Constitutional: Negative for chills, fatigue and fever.   HENT: Negative for congestion, ear pain, postnasal drip, sinus pressure and sore throat.    Respiratory: Negative for cough, shortness of breath and wheezing.  "   Cardiovascular: Negative for chest pain, palpitations and leg swelling.   Gastrointestinal: Negative for abdominal pain, blood in stool, constipation, diarrhea, nausea and vomiting.   Endocrine: Negative for cold intolerance, heat intolerance, polydipsia and polyuria.   Genitourinary: Negative for dysuria, frequency, hematuria and urgency.   Skin: Negative for rash.   Neurological: Negative for syncope and weakness.       Objective     Vitals:    12/14/18 1504   BP: 130/70   Pulse: 62   Resp: 16   Temp: 98.4 °F (36.9 °C)   TempSrc: Temporal   SpO2: 98%   Weight: 68 kg (150 lb)   Height: 160 cm (63\")     Physical Exam   Constitutional: She is oriented to person, place, and time. She appears well-developed and well-nourished. No distress.   Accompanied by her daughter, Vangie.     HENT:   Head: Normocephalic and atraumatic.   Nose: Right sinus exhibits no maxillary sinus tenderness and no frontal sinus tenderness. Left sinus exhibits no maxillary sinus tenderness and no frontal sinus tenderness.   Mouth/Throat: Uvula is midline, oropharynx is clear and moist and mucous membranes are normal. No oral lesions. No tonsillar exudate.   Eyes: Conjunctivae and EOM are normal. Pupils are equal, round, and reactive to light.   Neck: Trachea normal. Neck supple. No JVD present. Carotid bruit is not present. No tracheal deviation present. No thyroid mass and no thyromegaly present.   Cardiovascular: Normal rate, regular rhythm, normal heart sounds and intact distal pulses.  No extrasystoles are present. PMI is not displaced.   No murmur heard.  Defibrillator noted below the left clavicle     Pulmonary/Chest: Effort normal and breath sounds normal. No accessory muscle usage. No respiratory distress. She has no decreased breath sounds. She has no wheezes. She has no rhonchi. She has no rales.   Chronic lung sounds.     Abdominal: Soft. Bowel sounds are normal. She exhibits no distension. There is no hepatosplenomegaly. There " is no tenderness.     Vascular Status -  Her right foot exhibits abnormal foot vasculature  and abnormal foot edema (Trace edema bilateral ankles with diminished pulses bilateral ankles). Her left foot exhibits abnormal foot vasculature  and abnormal foot edema.  Lymphadenopathy:     She has no cervical adenopathy.   Neurological: She is alert and oriented to person, place, and time. No cranial nerve deficit. Coordination normal.   Skin: Skin is warm, dry and intact. No rash noted. No cyanosis. Nails show no clubbing.   Psychiatric: She has a normal mood and affect. Her speech is normal and behavior is normal. Judgment and thought content normal.   Vitals reviewed.      Assessment/Plan      Continue vitamin B-12 to 5000 units once weekly.     Continue the Dilantin and Keppra.    Continue the current blood pressure medications.    Continue with 1/2 tablet of  Mag-Ox q.o.d.      Continue with calcium and vitamin D supplements as well as monthly Prolia injections.   We will repeat DEXA next summer 07/2019.     Continue simvastatin and dietary efforts.       Continue with oral iron supplement 2-3 days weekly.        Forms are completed for a disabled parking permit.    Return for follow up in six months with fasting labs one week prior.       Scribed for Dr. Causey by Erin Cavazos [facility].     Diagnoses and all orders for this visit:    Essential hypertension  -     CBC Auto Differential; Future  -     Comprehensive Metabolic Panel; Future    Mixed hyperlipidemia  -     Lipid Panel; Future  -     TSH; Future    Panlobular emphysema (CMS/HCC)    Vitamin D deficiency  -     Vitamin D 25 Hydroxy; Future    Seizure disorder (CMS/HCC)  -     Phenytoin Level, Total; Future    Dietary vitamin B12 deficiency anemia  -     Vitamin B12; Future    Iron deficiency anemia secondary to inadequate dietary iron intake  -     Ferritin; Future    Fall in elderly patient    History of sustained ventricular tachycardia - S/P ICD  placement    Hypomagnesemia  -     Magnesium; Future    Recurrent major depressive disorder, in partial remission (CMS/HCC)    Ischemic cardiomyopathy    IFG (impaired fasting glucose)  -     Hemoglobin A1c; Future    Other orders  -     Pneumococcal Conjugate Vaccine 13-Valent All        Lab on 12/07/2018   Component Date Value Ref Range Status   • Phenytoin Level 12/07/2018 14.5  10.0 - 20.0 mcg/mL Final   • WBC 12/07/2018 4.39  3.20 - 9.80 10*3/mm3 Final   • RBC 12/07/2018 4.07  3.77 - 5.16 10*6/mm3 Final   • Hemoglobin 12/07/2018 13.0  12.0 - 15.5 g/dL Final   • Hematocrit 12/07/2018 39.3  35.0 - 45.0 % Final   • MCV 12/07/2018 96.6  80.0 - 98.0 fL Final   • MCH 12/07/2018 31.9  26.5 - 34.0 pg Final   • MCHC 12/07/2018 33.1  31.4 - 36.0 g/dL Final   • RDW 12/07/2018 12.4  11.5 - 14.5 % Final   • RDW-SD 12/07/2018 42.7  36.4 - 46.3 fl Final   • MPV 12/07/2018 9.4  8.0 - 12.0 fL Final   • Platelets 12/07/2018 209  150 - 450 10*3/mm3 Final   • Neutrophil % 12/07/2018 64.7  37.0 - 80.0 % Final   • Lymphocyte % 12/07/2018 24.4  10.0 - 50.0 % Final   • Monocyte % 12/07/2018 8.4  0.0 - 12.0 % Final   • Eosinophil % 12/07/2018 1.8  0.0 - 7.0 % Final   • Basophil % 12/07/2018 0.7  0.0 - 2.0 % Final   • Neutrophils, Absolute 12/07/2018 2.84  2.00 - 8.60 10*3/mm3 Final   • Lymphocytes, Absolute 12/07/2018 1.07  0.60 - 4.20 10*3/mm3 Final   • Monocytes, Absolute 12/07/2018 0.37  0.00 - 0.90 10*3/mm3 Final   • Eosinophils, Absolute 12/07/2018 0.08  0.00 - 0.70 10*3/mm3 Final   • Basophils, Absolute 12/07/2018 0.03  0.00 - 0.20 10*3/mm3 Final   • Glucose 12/07/2018 115* 74 - 99 mg/dL Final   • BUN 12/07/2018 18* 7 - 17 mg/dL Final   • Creatinine 12/07/2018 0.87  0.52 - 1.04 mg/dL Final   • Sodium 12/07/2018 142  137 - 145 mmol/L Final   • Potassium 12/07/2018 4.3  3.4 - 5.0 mmol/L Final   • Chloride 12/07/2018 113* 98 - 107 mmol/L Final   • CO2 12/07/2018 22.0  22.0 - 30.0 mmol/L Final   • Calcium 12/07/2018 9.2  8.4 - 10.2  mg/dL Final   • Total Protein 12/07/2018 7.0  6.3 - 8.2 g/dL Final   • Albumin 12/07/2018 4.50  3.50 - 5.00 g/dL Final   • ALT (SGPT) 12/07/2018 13  <=35 U/L Final   • AST (SGOT) 12/07/2018 21  14 - 36 U/L Final   • Alkaline Phosphatase 12/07/2018 36* 38 - 126 U/L Final   • Total Bilirubin 12/07/2018 0.5  0.2 - 1.3 mg/dL Final   • eGFR Non  Amer 12/07/2018 62  39 - 90 mL/min/1.73 Final   • Globulin 12/07/2018 2.5  2.3 - 3.5 gm/dL Final   • A/G Ratio 12/07/2018 1.8  1.1 - 1.8 g/dL Final   • BUN/Creatinine Ratio 12/07/2018 20.7  7.0 - 25.0 Final   • Anion Gap 12/07/2018 7.0  5.0 - 15.0 mmol/L Final   • Ferritin 12/07/2018 30.30  11.10 - 264.00 ng/mL Final   • LDL Cholesterol  12/07/2018 77  1 - 129 mg/dL Final   • Magnesium 12/07/2018 2.1  1.6 - 2.3 mg/dL Final   • 25 Hydroxy, Vitamin D 12/07/2018 48.5  30.0 - 100.0 ng/ml Final   • Vitamin B-12 12/07/2018 759  239 - 931 pg/mL Final   ]

## 2018-12-14 NOTE — PATIENT INSTRUCTIONS

## 2018-12-21 RX ORDER — LEVETIRACETAM 500 MG/1
TABLET, EXTENDED RELEASE ORAL
Qty: 180 TABLET | Refills: 11 | Status: SHIPPED | OUTPATIENT
Start: 2018-12-21 | End: 2019-11-22 | Stop reason: SDUPTHER

## 2019-01-30 RX ORDER — HYDRALAZINE HYDROCHLORIDE 25 MG/1
25 TABLET, FILM COATED ORAL 3 TIMES DAILY
Qty: 90 TABLET | Refills: 3 | Status: SHIPPED | OUTPATIENT
Start: 2019-01-30 | End: 2019-07-08 | Stop reason: SDUPTHER

## 2019-02-11 ENCOUNTER — APPOINTMENT (OUTPATIENT)
Dept: ONCOLOGY | Facility: HOSPITAL | Age: 84
End: 2019-02-11

## 2019-02-18 ENCOUNTER — APPOINTMENT (OUTPATIENT)
Dept: ONCOLOGY | Facility: HOSPITAL | Age: 84
End: 2019-02-18

## 2019-02-26 ENCOUNTER — INFUSION (OUTPATIENT)
Dept: ONCOLOGY | Facility: HOSPITAL | Age: 84
End: 2019-02-26

## 2019-02-26 DIAGNOSIS — M81.0 AGE-RELATED OSTEOPOROSIS WITHOUT CURRENT PATHOLOGICAL FRACTURE: Primary | ICD-10-CM

## 2019-02-26 LAB
CALCIUM SPEC-SCNC: 8.9 MG/DL (ref 8.4–10.2)
MAGNESIUM SERPL-MCNC: 2 MG/DL (ref 1.6–2.3)
PHOSPHATE SERPL-MCNC: 3.8 MG/DL (ref 2.4–4.4)

## 2019-02-26 PROCEDURE — 84100 ASSAY OF PHOSPHORUS: CPT | Performed by: NURSE PRACTITIONER

## 2019-02-26 PROCEDURE — 83735 ASSAY OF MAGNESIUM: CPT | Performed by: NURSE PRACTITIONER

## 2019-02-26 PROCEDURE — 96372 THER/PROPH/DIAG INJ SC/IM: CPT | Performed by: NURSE PRACTITIONER

## 2019-02-26 PROCEDURE — 36415 COLL VENOUS BLD VENIPUNCTURE: CPT | Performed by: NURSE PRACTITIONER

## 2019-02-26 PROCEDURE — 82310 ASSAY OF CALCIUM: CPT | Performed by: NURSE PRACTITIONER

## 2019-02-26 PROCEDURE — 25010000002 DENOSUMAB 60 MG/ML SOLUTION: Performed by: NURSE PRACTITIONER

## 2019-02-26 RX ADMIN — DENOSUMAB 60 MG: 60 INJECTION SUBCUTANEOUS at 15:55

## 2019-03-15 RX ORDER — ENALAPRIL MALEATE 20 MG/1
TABLET ORAL
Qty: 60 TABLET | Refills: 11 | Status: SHIPPED | OUTPATIENT
Start: 2019-03-15 | End: 2019-05-07 | Stop reason: SDUPTHER

## 2019-03-15 RX ORDER — CLOPIDOGREL BISULFATE 75 MG/1
TABLET ORAL
Qty: 30 TABLET | Refills: 11 | Status: SHIPPED | OUTPATIENT
Start: 2019-03-15 | End: 2020-03-16

## 2019-04-23 RX ORDER — SOTALOL HYDROCHLORIDE 80 MG/1
TABLET ORAL
Qty: 60 TABLET | Refills: 11 | Status: SHIPPED | OUTPATIENT
Start: 2019-04-23 | End: 2020-05-04

## 2019-04-29 RX ORDER — VENLAFAXINE HYDROCHLORIDE 75 MG/1
CAPSULE, EXTENDED RELEASE ORAL
Qty: 30 CAPSULE | Refills: 11 | Status: SHIPPED | OUTPATIENT
Start: 2019-04-29 | End: 2020-05-04

## 2019-05-07 RX ORDER — ENALAPRIL MALEATE 20 MG/1
20 TABLET ORAL 2 TIMES DAILY
Qty: 180 TABLET | Refills: 3 | Status: SHIPPED | OUTPATIENT
Start: 2019-05-07 | End: 2020-02-19 | Stop reason: ALTCHOICE

## 2019-05-20 RX ORDER — PHENYTOIN SODIUM 100 MG/1
300 CAPSULE, EXTENDED RELEASE ORAL NIGHTLY
Qty: 90 CAPSULE | Refills: 11 | Status: SHIPPED | OUTPATIENT
Start: 2019-05-20 | End: 2020-05-15

## 2019-06-11 RX ORDER — SIMVASTATIN 40 MG
TABLET ORAL
Qty: 90 TABLET | Refills: 3 | Status: SHIPPED | OUTPATIENT
Start: 2019-06-11 | End: 2020-05-27

## 2019-06-11 RX ORDER — AMLODIPINE BESYLATE 5 MG/1
5 TABLET ORAL 2 TIMES DAILY
Qty: 60 TABLET | Refills: 11 | Status: SHIPPED | OUTPATIENT
Start: 2019-06-11 | End: 2020-06-05

## 2019-06-18 ENCOUNTER — LAB (OUTPATIENT)
Dept: LAB | Facility: OTHER | Age: 84
End: 2019-06-18

## 2019-06-18 DIAGNOSIS — E55.9 VITAMIN D DEFICIENCY: Chronic | ICD-10-CM

## 2019-06-18 DIAGNOSIS — E83.42 HYPOMAGNESEMIA: Chronic | ICD-10-CM

## 2019-06-18 DIAGNOSIS — D50.8 IRON DEFICIENCY ANEMIA SECONDARY TO INADEQUATE DIETARY IRON INTAKE: Chronic | ICD-10-CM

## 2019-06-18 DIAGNOSIS — R73.01 IFG (IMPAIRED FASTING GLUCOSE): ICD-10-CM

## 2019-06-18 DIAGNOSIS — G40.909 SEIZURE DISORDER (HCC): Chronic | ICD-10-CM

## 2019-06-18 DIAGNOSIS — I10 ESSENTIAL HYPERTENSION: Chronic | ICD-10-CM

## 2019-06-18 DIAGNOSIS — E78.2 MIXED HYPERLIPIDEMIA: Chronic | ICD-10-CM

## 2019-06-18 DIAGNOSIS — D51.8 DIETARY VITAMIN B12 DEFICIENCY ANEMIA: Chronic | ICD-10-CM

## 2019-06-18 LAB
25(OH)D3 SERPL-MCNC: 30.5 NG/ML (ref 30–100)
ALBUMIN SERPL-MCNC: 4.2 G/DL (ref 3.5–5)
ALBUMIN/GLOB SERPL: 1.5 G/DL (ref 1.1–1.8)
ALP SERPL-CCNC: 35 U/L (ref 38–126)
ALT SERPL W P-5'-P-CCNC: 12 U/L
ANION GAP SERPL CALCULATED.3IONS-SCNC: 10 MMOL/L (ref 5–15)
AST SERPL-CCNC: 19 U/L (ref 14–36)
BASOPHILS # BLD AUTO: 0.02 10*3/MM3 (ref 0–0.2)
BASOPHILS NFR BLD AUTO: 0.5 % (ref 0–1.5)
BILIRUB SERPL-MCNC: 0.4 MG/DL (ref 0.2–1.3)
BUN BLD-MCNC: 14 MG/DL (ref 7–23)
BUN/CREAT SERPL: 16.1 (ref 7–25)
CALCIUM SPEC-SCNC: 9.3 MG/DL (ref 8.4–10.2)
CHLORIDE SERPL-SCNC: 108 MMOL/L (ref 101–112)
CHOLEST SERPL-MCNC: 197 MG/DL (ref 150–200)
CO2 SERPL-SCNC: 25 MMOL/L (ref 22–30)
CREAT BLD-MCNC: 0.87 MG/DL (ref 0.52–1.04)
DEPRECATED RDW RBC AUTO: 43 FL (ref 37–54)
EOSINOPHIL # BLD AUTO: 0.1 10*3/MM3 (ref 0–0.4)
EOSINOPHIL NFR BLD AUTO: 2.3 % (ref 0.3–6.2)
ERYTHROCYTE [DISTWIDTH] IN BLOOD BY AUTOMATED COUNT: 12.5 % (ref 12.3–15.4)
FERRITIN SERPL-MCNC: 36.4 NG/ML (ref 13–150)
GFR SERPL CREATININE-BSD FRML MDRD: 62 ML/MIN/1.73 (ref 39–90)
GLOBULIN UR ELPH-MCNC: 2.8 GM/DL (ref 2.3–3.5)
GLUCOSE BLD-MCNC: 106 MG/DL (ref 70–99)
HBA1C MFR BLD: 5.15 % (ref 4.8–5.6)
HCT VFR BLD AUTO: 38.8 % (ref 34–46.6)
HDLC SERPL-MCNC: 90 MG/DL (ref 40–59)
HGB BLD-MCNC: 13.2 G/DL (ref 12–15.9)
LDLC SERPL CALC-MCNC: 71 MG/DL
LDLC/HDLC SERPL: 0.79 {RATIO} (ref 0–3.22)
LYMPHOCYTES # BLD AUTO: 1.06 10*3/MM3 (ref 0.7–3.1)
LYMPHOCYTES NFR BLD AUTO: 24.8 % (ref 19.6–45.3)
MAGNESIUM SERPL-MCNC: 2.1 MG/DL (ref 1.6–2.3)
MCH RBC QN AUTO: 33.2 PG (ref 26.6–33)
MCHC RBC AUTO-ENTMCNC: 34 G/DL (ref 31.5–35.7)
MCV RBC AUTO: 97.5 FL (ref 79–97)
MONOCYTES # BLD AUTO: 0.51 10*3/MM3 (ref 0.1–0.9)
MONOCYTES NFR BLD AUTO: 11.9 % (ref 5–12)
NEUTROPHILS # BLD AUTO: 2.59 10*3/MM3 (ref 1.7–7)
NEUTROPHILS NFR BLD AUTO: 60.5 % (ref 42.7–76)
PHENYTOIN SERPL-MCNC: 12 MCG/ML (ref 10–20)
PLATELET # BLD AUTO: 187 10*3/MM3 (ref 140–450)
PMV BLD AUTO: 9.6 FL (ref 6–12)
POTASSIUM BLD-SCNC: 4.3 MMOL/L (ref 3.4–5)
PROT SERPL-MCNC: 7 G/DL (ref 6.3–8.6)
RBC # BLD AUTO: 3.98 10*6/MM3 (ref 3.77–5.28)
SODIUM BLD-SCNC: 143 MMOL/L (ref 137–145)
TRIGL SERPL-MCNC: 180 MG/DL
TSH SERPL DL<=0.05 MIU/L-ACNC: 2.93 MIU/ML (ref 0.27–4.2)
VIT B12 BLD-MCNC: 851 PG/ML (ref 211–946)
VLDLC SERPL-MCNC: 36 MG/DL
WBC NRBC COR # BLD: 4.28 10*3/MM3 (ref 3.4–10.8)

## 2019-06-18 PROCEDURE — 84443 ASSAY THYROID STIM HORMONE: CPT | Performed by: INTERNAL MEDICINE

## 2019-06-18 PROCEDURE — 80053 COMPREHEN METABOLIC PANEL: CPT | Performed by: INTERNAL MEDICINE

## 2019-06-18 PROCEDURE — 83735 ASSAY OF MAGNESIUM: CPT | Performed by: INTERNAL MEDICINE

## 2019-06-18 PROCEDURE — 82306 VITAMIN D 25 HYDROXY: CPT | Performed by: INTERNAL MEDICINE

## 2019-06-18 PROCEDURE — 82607 VITAMIN B-12: CPT | Performed by: INTERNAL MEDICINE

## 2019-06-18 PROCEDURE — 83036 HEMOGLOBIN GLYCOSYLATED A1C: CPT | Performed by: INTERNAL MEDICINE

## 2019-06-18 PROCEDURE — 82728 ASSAY OF FERRITIN: CPT | Performed by: INTERNAL MEDICINE

## 2019-06-18 PROCEDURE — 85025 COMPLETE CBC W/AUTO DIFF WBC: CPT | Performed by: INTERNAL MEDICINE

## 2019-06-18 PROCEDURE — 36415 COLL VENOUS BLD VENIPUNCTURE: CPT | Performed by: INTERNAL MEDICINE

## 2019-06-18 PROCEDURE — 80185 ASSAY OF PHENYTOIN TOTAL: CPT | Performed by: INTERNAL MEDICINE

## 2019-06-18 PROCEDURE — 80061 LIPID PANEL: CPT | Performed by: INTERNAL MEDICINE

## 2019-06-21 ENCOUNTER — OFFICE VISIT (OUTPATIENT)
Dept: FAMILY MEDICINE CLINIC | Facility: CLINIC | Age: 84
End: 2019-06-21

## 2019-06-21 VITALS
BODY MASS INDEX: 25.59 KG/M2 | WEIGHT: 144.4 LBS | HEIGHT: 63 IN | DIASTOLIC BLOOD PRESSURE: 76 MMHG | HEART RATE: 56 BPM | SYSTOLIC BLOOD PRESSURE: 138 MMHG | TEMPERATURE: 98.1 F

## 2019-06-21 DIAGNOSIS — F33.41 RECURRENT MAJOR DEPRESSIVE DISORDER, IN PARTIAL REMISSION (HCC): Chronic | ICD-10-CM

## 2019-06-21 DIAGNOSIS — J43.1 PANLOBULAR EMPHYSEMA (HCC): Chronic | ICD-10-CM

## 2019-06-21 DIAGNOSIS — I25.5 ISCHEMIC CARDIOMYOPATHY: Chronic | ICD-10-CM

## 2019-06-21 DIAGNOSIS — G47.9 SLEEP DISORDER: Chronic | ICD-10-CM

## 2019-06-21 DIAGNOSIS — G40.909 SEIZURE DISORDER (HCC): Chronic | ICD-10-CM

## 2019-06-21 DIAGNOSIS — H53.462 LEFT HOMONYMOUS HEMIANOPSIA: Chronic | ICD-10-CM

## 2019-06-21 DIAGNOSIS — E83.42 HYPOMAGNESEMIA: Chronic | ICD-10-CM

## 2019-06-21 DIAGNOSIS — F25.9 SCHIZOAFFECTIVE DISORDER, UNSPECIFIED TYPE (HCC): Chronic | ICD-10-CM

## 2019-06-21 DIAGNOSIS — I10 ESSENTIAL HYPERTENSION: Primary | Chronic | ICD-10-CM

## 2019-06-21 DIAGNOSIS — I65.23 BILATERAL CAROTID ARTERY STENOSIS: Chronic | ICD-10-CM

## 2019-06-21 DIAGNOSIS — M81.0 AGE-RELATED OSTEOPOROSIS WITHOUT CURRENT PATHOLOGICAL FRACTURE: Chronic | ICD-10-CM

## 2019-06-21 DIAGNOSIS — E78.2 MIXED HYPERLIPIDEMIA: Chronic | ICD-10-CM

## 2019-06-21 DIAGNOSIS — I63.219 OCCLUSION AND STENOSIS OF VERTEBRAL ARTERY WITH CEREBRAL INFARCTION (HCC): Chronic | ICD-10-CM

## 2019-06-21 DIAGNOSIS — D50.8 IRON DEFICIENCY ANEMIA SECONDARY TO INADEQUATE DIETARY IRON INTAKE: Chronic | ICD-10-CM

## 2019-06-21 DIAGNOSIS — E55.9 VITAMIN D DEFICIENCY: Chronic | ICD-10-CM

## 2019-06-21 DIAGNOSIS — D51.8 DIETARY VITAMIN B12 DEFICIENCY ANEMIA: Chronic | ICD-10-CM

## 2019-06-21 PROCEDURE — 99214 OFFICE O/P EST MOD 30 MIN: CPT | Performed by: INTERNAL MEDICINE

## 2019-06-21 RX ORDER — ERGOCALCIFEROL 1.25 MG/1
50000 CAPSULE ORAL
Qty: 13 CAPSULE | Refills: 3 | Status: SHIPPED | OUTPATIENT
Start: 2019-06-21 | End: 2020-07-01

## 2019-06-21 NOTE — PROGRESS NOTES
Subjective        History of Present Illness     Neisha Duran is a 84 y.o. female who comes in for follow up on multiple complex issues including extensive vascular disease, hyperlipidemia, hypertension, carotid artery stenosis, COPD, and iron deficiency anemia among many other issues.  She has a defibrillator/pacemaker and continues on Plavix.  GERD symptoms adequately controlled with Protonix.  She ambulates with a cane and denies recent falls. She has a history of seizure, but denies any recent seizure activity.  She reports she is sleeping well.  She feels her anxiety/depression are adequately managed with Effexor XR.       She stopped her magnesium supplement due to loose stool even with decreasing her dose.  Her magnesium level is at goal without the supplement.      She is due repeat DEXA.  DEXA 07/2017 reveals normal density in the lumbar spine, but persistent osteoporosis of the left hip.  She continues the calcium and vitamin D supplements and Prolia injections every 6 months.     Patient has carotid artery stenosis.  Carotid ultrasound 03/2016 revealed mild plaque in the bilateral common carotid arteries.      Weight is down 6 pounds in the past six months.  Blood pressure at goal.     The patient's relevant past medical, surgical, and social history was reviewed in Epic.   Lab results are reviewed with the patient today.  Fasting glucose 106. A1c is 5.1. Normal renal and liver function.  Iron levels adequate.  Thyroid function normal limits.  Total cholesterol 197.  HDL good at 90.  LDL 71.  Triglycerides 180 with excellent LDL/HDL ratio 0.79.          Review of Systems   Constitutional: Negative for chills, fatigue and fever.   HENT: Negative for congestion, ear pain, postnasal drip, sinus pressure and sore throat.    Respiratory: Negative for cough, shortness of breath and wheezing.    Cardiovascular: Negative for chest pain, palpitations and leg swelling.   Gastrointestinal: Negative for abdominal  "pain, blood in stool, constipation, diarrhea, nausea and vomiting.   Endocrine: Negative for cold intolerance, heat intolerance, polydipsia and polyuria.   Genitourinary: Negative for dysuria, frequency, hematuria and urgency.   Skin: Negative for rash.   Neurological: Negative for syncope and weakness.        Objective     Vitals:    06/21/19 1520   BP: 138/76   Pulse: 56   Temp: 98.1 °F (36.7 °C)   TempSrc: Oral   Weight: 65.5 kg (144 lb 6.4 oz)   Height: 160 cm (63\")     Physical Exam   Constitutional: She is oriented to person, place, and time. She appears well-developed and well-nourished. No distress.   Accompanied by her daughter, Vangie.      HENT:   Head: Normocephalic and atraumatic.   Nose: Right sinus exhibits no maxillary sinus tenderness and no frontal sinus tenderness. Left sinus exhibits no maxillary sinus tenderness and no frontal sinus tenderness.   Mouth/Throat: Uvula is midline, oropharynx is clear and moist and mucous membranes are normal. No oral lesions. No tonsillar exudate.   Eyes: Conjunctivae and EOM are normal. Pupils are equal, round, and reactive to light.   Neck: Trachea normal. Neck supple. No JVD present. Carotid bruit is not present. No tracheal deviation present. No thyroid mass and no thyromegaly present.   Cardiovascular: Normal rate, regular rhythm, normal heart sounds and intact distal pulses.  No extrasystoles are present. PMI is not displaced.   No murmur heard.  Defibrillator noted below the left clavicle.      Pulmonary/Chest: Effort normal and breath sounds normal. No accessory muscle usage. No respiratory distress. She has no decreased breath sounds. She has no wheezes. She has no rhonchi. She has no rales.   Chronic lung sounds.     Abdominal: Soft. Bowel sounds are normal. She exhibits no distension. There is no hepatosplenomegaly. There is no tenderness.     Vascular Status -  Her right foot exhibits abnormal foot vasculature  (Diminished pulses bilateral ankles) and " abnormal foot edema (Trace edema bilateral ankles with evidence of chronic venous insufficiency.  ). Her left foot exhibits abnormal foot vasculature  and abnormal foot edema.  Lymphadenopathy:     She has no cervical adenopathy.   Neurological: She is alert and oriented to person, place, and time. No cranial nerve deficit. Coordination normal.   Skin: Skin is warm, dry and intact. No rash noted. No cyanosis. Nails show no clubbing.   Psychiatric: She has a normal mood and affect. Her speech is normal and behavior is normal. Judgment and thought content normal.   Vitals reviewed.      Assessment/Plan      Increase vitamin D 50,000 units to take one weekly as well as calcium supplement and Prolia injections.   An order is sent for repeat DEXA.      An order is sent for repeat carotid Doppler ultrasound.  Continue Zocor and dietary efforts.  Continue the Plavix.      Continue the Dilantin and Keppra to manage seizure disorder.  Continue Effexor XR for anxiety/depression.    Continue iron three times weekly and B-12 supplement 5000 units once weekly.    Continue the current blood pressure medications.     She can remain off of the magnesium supplement.       Return for follow up in six months with fasting labs one week prior.       Scribed for Dr. Causey by Erin Cavazos [facility].     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)    Mixed hyperlipidemia  -     LDL Cholesterol, Direct; Future    Panlobular emphysema (CMS/HCC)    Vitamin D deficiency  -     Vitamin D 25 Hydroxy; Future    Age-related osteoporosis without current pathological fracture  -     DEXA Bone Density Axial; Future    Dietary vitamin B12 deficiency anemia  -     Vitamin B12; Future    Iron deficiency anemia secondary to inadequate dietary  iron intake  -     Ferritin; Future    Hypomagnesemia  -     Magnesium; Future    Recurrent major depressive disorder, in partial remission (CMS/HCC)    Schizoaffective disorder, unspecified type (CMS/HCC)    Sleep disorder  -     Phenytoin Level, Total; Future    Seizure disorder (CMS/HCC)    Left homonymous hemianopsia    Bilateral carotid artery stenosis  -     US Carotid Bilateral; Future    Other orders  -     vitamin D (ERGOCALCIFEROL) 96251 units capsule capsule; Take 1 capsule by mouth Every 7 (Seven) Days.        Lab on 06/18/2019   Component Date Value Ref Range Status   • WBC 06/18/2019 4.28  3.40 - 10.80 10*3/mm3 Final   • RBC 06/18/2019 3.98  3.77 - 5.28 10*6/mm3 Final   • Hemoglobin 06/18/2019 13.2  12.0 - 15.9 g/dL Final   • Hematocrit 06/18/2019 38.8  34.0 - 46.6 % Final   • MCV 06/18/2019 97.5* 79.0 - 97.0 fL Final   • MCH 06/18/2019 33.2* 26.6 - 33.0 pg Final   • MCHC 06/18/2019 34.0  31.5 - 35.7 g/dL Final   • RDW 06/18/2019 12.5  12.3 - 15.4 % Final   • RDW-SD 06/18/2019 43.0  37.0 - 54.0 fl Final   • MPV 06/18/2019 9.6  6.0 - 12.0 fL Final   • Platelets 06/18/2019 187  140 - 450 10*3/mm3 Final   • Neutrophil % 06/18/2019 60.5  42.7 - 76.0 % Final   • Lymphocyte % 06/18/2019 24.8  19.6 - 45.3 % Final   • Monocyte % 06/18/2019 11.9  5.0 - 12.0 % Final   • Eosinophil % 06/18/2019 2.3  0.3 - 6.2 % Final   • Basophil % 06/18/2019 0.5  0.0 - 1.5 % Final   • Neutrophils, Absolute 06/18/2019 2.59  1.70 - 7.00 10*3/mm3 Final   • Lymphocytes, Absolute 06/18/2019 1.06  0.70 - 3.10 10*3/mm3 Final   • Monocytes, Absolute 06/18/2019 0.51  0.10 - 0.90 10*3/mm3 Final   • Eosinophils, Absolute 06/18/2019 0.10  0.00 - 0.40 10*3/mm3 Final   • Basophils, Absolute 06/18/2019 0.02  0.00 - 0.20 10*3/mm3 Final   • Glucose 06/18/2019 106* 70 - 99 mg/dL Final   • BUN 06/18/2019 14  7 - 23 mg/dL Final   • Creatinine 06/18/2019 0.87  0.52 - 1.04 mg/dL Final   • Sodium 06/18/2019 143  137 - 145 mmol/L Final   • Potassium  06/18/2019 4.3  3.4 - 5.0 mmol/L Final   • Chloride 06/18/2019 108  101 - 112 mmol/L Final   • CO2 06/18/2019 25.0  22.0 - 30.0 mmol/L Final   • Calcium 06/18/2019 9.3  8.4 - 10.2 mg/dL Final   • Total Protein 06/18/2019 7.0  6.3 - 8.6 g/dL Final   • Albumin 06/18/2019 4.20  3.50 - 5.00 g/dL Final   • ALT (SGPT) 06/18/2019 12  <=35 U/L Final   • AST (SGOT) 06/18/2019 19  14 - 36 U/L Final   • Alkaline Phosphatase 06/18/2019 35* 38 - 126 U/L Final   • Total Bilirubin 06/18/2019 0.4  0.2 - 1.3 mg/dL Final   • eGFR Non  Amer 06/18/2019 62  39 - 90 mL/min/1.73 Final   • Globulin 06/18/2019 2.8  2.3 - 3.5 gm/dL Final   • A/G Ratio 06/18/2019 1.5  1.1 - 1.8 g/dL Final   • BUN/Creatinine Ratio 06/18/2019 16.1  7.0 - 25.0 Final   • Anion Gap 06/18/2019 10.0  5.0 - 15.0 mmol/L Final   • Ferritin 06/18/2019 36.40  13.00 - 150.00 ng/mL Final   • Hemoglobin A1C 06/18/2019 5.15  4.80 - 5.60 % Final   • Total Cholesterol 06/18/2019 197  150 - 200 mg/dL Final   • Triglycerides 06/18/2019 180* <=150 mg/dL Final   • HDL Cholesterol 06/18/2019 90* 40 - 59 mg/dL Final   • LDL Cholesterol  06/18/2019 71  <=100 mg/dL Final   • VLDL Cholesterol 06/18/2019 36  mg/dL Final   • LDL/HDL Ratio 06/18/2019 0.79  0.00 - 3.22 Final   • Magnesium 06/18/2019 2.1  1.6 - 2.3 mg/dL Final   • Vitamin B-12 06/18/2019 851  211 - 946 pg/mL Final   • 25 Hydroxy, Vitamin D 06/18/2019 30.5  30.0 - 100.0 ng/ml Final   • TSH 06/18/2019 2.930  0.270 - 4.200 mIU/mL Final   • Phenytoin Level 06/18/2019 12.0  10.0 - 20.0 mcg/mL Final   ]

## 2019-07-08 RX ORDER — HYDRALAZINE HYDROCHLORIDE 25 MG/1
25 TABLET, FILM COATED ORAL 3 TIMES DAILY
Qty: 90 TABLET | Refills: 3 | OUTPATIENT
Start: 2019-07-08

## 2019-07-08 RX ORDER — HYDRALAZINE HYDROCHLORIDE 25 MG/1
25 TABLET, FILM COATED ORAL 3 TIMES DAILY
Qty: 90 TABLET | Refills: 3 | Status: SHIPPED | OUTPATIENT
Start: 2019-07-08 | End: 2019-10-01 | Stop reason: SDUPTHER

## 2019-08-26 ENCOUNTER — APPOINTMENT (OUTPATIENT)
Dept: ONCOLOGY | Facility: HOSPITAL | Age: 84
End: 2019-08-26

## 2019-08-27 ENCOUNTER — INFUSION (OUTPATIENT)
Dept: ONCOLOGY | Facility: HOSPITAL | Age: 84
End: 2019-08-27

## 2019-08-27 DIAGNOSIS — M81.0 AGE-RELATED OSTEOPOROSIS WITHOUT CURRENT PATHOLOGICAL FRACTURE: Primary | ICD-10-CM

## 2019-08-27 LAB
CALCIUM SPEC-SCNC: 8.7 MG/DL (ref 8.6–10.5)
MAGNESIUM SERPL-MCNC: 1.9 MG/DL (ref 1.6–2.4)
PHOSPHATE SERPL-MCNC: 2.8 MG/DL (ref 2.5–4.5)

## 2019-08-27 PROCEDURE — 84100 ASSAY OF PHOSPHORUS: CPT | Performed by: NURSE PRACTITIONER

## 2019-08-27 PROCEDURE — 82310 ASSAY OF CALCIUM: CPT | Performed by: NURSE PRACTITIONER

## 2019-08-27 PROCEDURE — 25010000002 DENOSUMAB 60 MG/ML SOLUTION PREFILLED SYRINGE: Performed by: NURSE PRACTITIONER

## 2019-08-27 PROCEDURE — 83735 ASSAY OF MAGNESIUM: CPT | Performed by: NURSE PRACTITIONER

## 2019-08-27 PROCEDURE — 96372 THER/PROPH/DIAG INJ SC/IM: CPT | Performed by: NURSE PRACTITIONER

## 2019-08-27 PROCEDURE — 36415 COLL VENOUS BLD VENIPUNCTURE: CPT | Performed by: NURSE PRACTITIONER

## 2019-08-27 RX ADMIN — DENOSUMAB 60 MG: 60 INJECTION SUBCUTANEOUS at 15:21

## 2019-10-01 RX ORDER — HYDRALAZINE HYDROCHLORIDE 25 MG/1
TABLET, FILM COATED ORAL
Qty: 90 TABLET | Refills: 3 | Status: SHIPPED | OUTPATIENT
Start: 2019-10-01 | End: 2020-01-07

## 2019-10-28 RX ORDER — PANTOPRAZOLE SODIUM 40 MG/1
TABLET, DELAYED RELEASE ORAL
Qty: 60 TABLET | Refills: 11 | Status: SHIPPED | OUTPATIENT
Start: 2019-10-28 | End: 2020-10-22 | Stop reason: SDUPTHER

## 2019-11-22 RX ORDER — LEVETIRACETAM 500 MG/1
TABLET, EXTENDED RELEASE ORAL
Qty: 180 TABLET | Refills: 11 | Status: SHIPPED | OUTPATIENT
Start: 2019-11-22 | End: 2020-12-18

## 2019-12-20 ENCOUNTER — TELEPHONE (OUTPATIENT)
Dept: FAMILY MEDICINE CLINIC | Facility: CLINIC | Age: 84
End: 2019-12-20

## 2019-12-20 DIAGNOSIS — R30.0 DYSURIA: Primary | ICD-10-CM

## 2019-12-20 LAB
BACTERIA UR QL AUTO: ABNORMAL /HPF
BILIRUB UR QL STRIP: NEGATIVE
CLARITY UR: ABNORMAL
COLOR UR: YELLOW
GLUCOSE UR STRIP-MCNC: NEGATIVE MG/DL
HGB UR QL STRIP.AUTO: NEGATIVE
HYALINE CASTS UR QL AUTO: ABNORMAL /LPF
KETONES UR QL STRIP: NEGATIVE
LEUKOCYTE ESTERASE UR QL STRIP.AUTO: ABNORMAL
NITRITE UR QL STRIP: POSITIVE
PH UR STRIP.AUTO: 6 [PH] (ref 5.5–8)
PROT UR QL STRIP: NEGATIVE
RBC # UR: ABNORMAL /HPF
REF LAB TEST METHOD: ABNORMAL
SP GR UR STRIP: 1.02 (ref 1–1.03)
SQUAMOUS #/AREA URNS HPF: ABNORMAL /HPF
UROBILINOGEN UR QL STRIP: ABNORMAL
WBC UR QL AUTO: ABNORMAL /HPF

## 2019-12-20 PROCEDURE — 87088 URINE BACTERIA CULTURE: CPT | Performed by: INTERNAL MEDICINE

## 2019-12-20 PROCEDURE — 87086 URINE CULTURE/COLONY COUNT: CPT | Performed by: INTERNAL MEDICINE

## 2019-12-20 PROCEDURE — 81001 URINALYSIS AUTO W/SCOPE: CPT | Performed by: INTERNAL MEDICINE

## 2019-12-20 PROCEDURE — 87186 SC STD MICRODIL/AGAR DIL: CPT | Performed by: INTERNAL MEDICINE

## 2019-12-20 RX ORDER — CEFUROXIME AXETIL 250 MG/1
250 TABLET ORAL 2 TIMES DAILY
Qty: 14 TABLET | Refills: 0 | Status: SHIPPED | OUTPATIENT
Start: 2019-12-20 | End: 2019-12-27

## 2019-12-20 RX ORDER — IRON POLYSACCHARIDE COMPLEX 150 MG
CAPSULE ORAL
Qty: 180 CAPSULE | Refills: 3 | Status: SHIPPED | OUTPATIENT
Start: 2019-12-20 | End: 2022-02-15

## 2019-12-20 NOTE — TELEPHONE ENCOUNTER
12/20/2019 relayed results and meds sent to pharmacy. TP        ----- Message from Keith Causey MD sent at 12/20/2019  1:32 PM CST -----  Inform her daughter, Vangie, that she has a urinary tract infection.  Ceftin 250 mg twice daily for 7 days.  EB

## 2019-12-22 LAB — BACTERIA SPEC AEROBE CULT: ABNORMAL

## 2020-01-06 ENCOUNTER — LAB (OUTPATIENT)
Dept: LAB | Facility: OTHER | Age: 85
End: 2020-01-06

## 2020-01-06 DIAGNOSIS — E78.2 MIXED HYPERLIPIDEMIA: Chronic | ICD-10-CM

## 2020-01-06 DIAGNOSIS — G47.9 SLEEP DISORDER: Chronic | ICD-10-CM

## 2020-01-06 DIAGNOSIS — E83.42 HYPOMAGNESEMIA: Chronic | ICD-10-CM

## 2020-01-06 DIAGNOSIS — I10 ESSENTIAL HYPERTENSION: Chronic | ICD-10-CM

## 2020-01-06 DIAGNOSIS — E55.9 VITAMIN D DEFICIENCY: Chronic | ICD-10-CM

## 2020-01-06 DIAGNOSIS — D50.8 IRON DEFICIENCY ANEMIA SECONDARY TO INADEQUATE DIETARY IRON INTAKE: Chronic | ICD-10-CM

## 2020-01-06 DIAGNOSIS — D51.8 DIETARY VITAMIN B12 DEFICIENCY ANEMIA: Chronic | ICD-10-CM

## 2020-01-06 LAB
25(OH)D3 SERPL-MCNC: 66.3 NG/ML (ref 30–100)
ALBUMIN SERPL-MCNC: 4.2 G/DL (ref 3.5–5)
ALBUMIN/GLOB SERPL: 1.5 G/DL (ref 1.1–1.8)
ALP SERPL-CCNC: 31 U/L (ref 38–126)
ALT SERPL W P-5'-P-CCNC: 11 U/L
ANION GAP SERPL CALCULATED.3IONS-SCNC: 8 MMOL/L (ref 5–15)
ARTICHOKE IGE QN: 99 MG/DL (ref 0–100)
AST SERPL-CCNC: 19 U/L (ref 14–36)
BASOPHILS # BLD AUTO: 0.05 10*3/MM3 (ref 0–0.2)
BASOPHILS NFR BLD AUTO: 0.8 % (ref 0–1.5)
BILIRUB SERPL-MCNC: 0.4 MG/DL (ref 0.2–1.3)
BUN BLD-MCNC: 17 MG/DL (ref 7–23)
BUN/CREAT SERPL: 20 (ref 7–25)
CALCIUM SPEC-SCNC: 9.2 MG/DL (ref 8.4–10.2)
CHLORIDE SERPL-SCNC: 107 MMOL/L (ref 101–112)
CO2 SERPL-SCNC: 26 MMOL/L (ref 22–30)
CREAT BLD-MCNC: 0.85 MG/DL (ref 0.52–1.04)
DEPRECATED RDW RBC AUTO: 43 FL (ref 37–54)
EOSINOPHIL # BLD AUTO: 0.14 10*3/MM3 (ref 0–0.4)
EOSINOPHIL NFR BLD AUTO: 2.3 % (ref 0.3–6.2)
ERYTHROCYTE [DISTWIDTH] IN BLOOD BY AUTOMATED COUNT: 12.8 % (ref 12.3–15.4)
FERRITIN SERPL-MCNC: 34.8 NG/ML (ref 13–150)
GFR SERPL CREATININE-BSD FRML MDRD: 64 ML/MIN/1.73 (ref 39–90)
GLOBULIN UR ELPH-MCNC: 2.8 GM/DL (ref 2.3–3.5)
GLUCOSE BLD-MCNC: 108 MG/DL (ref 70–99)
HCT VFR BLD AUTO: 39.9 % (ref 34–46.6)
HGB BLD-MCNC: 13.4 G/DL (ref 12–15.9)
LYMPHOCYTES # BLD AUTO: 1.18 10*3/MM3 (ref 0.7–3.1)
LYMPHOCYTES NFR BLD AUTO: 19.1 % (ref 19.6–45.3)
MAGNESIUM SERPL-MCNC: 1.9 MG/DL (ref 1.6–2.3)
MCH RBC QN AUTO: 31.9 PG (ref 26.6–33)
MCHC RBC AUTO-ENTMCNC: 33.6 G/DL (ref 31.5–35.7)
MCV RBC AUTO: 95 FL (ref 79–97)
MONOCYTES # BLD AUTO: 0.45 10*3/MM3 (ref 0.1–0.9)
MONOCYTES NFR BLD AUTO: 7.3 % (ref 5–12)
NEUTROPHILS # BLD AUTO: 4.37 10*3/MM3 (ref 1.7–7)
NEUTROPHILS NFR BLD AUTO: 70.5 % (ref 42.7–76)
PHENYTOIN SERPL-MCNC: 15.2 MCG/ML (ref 10–20)
PLATELET # BLD AUTO: 205 10*3/MM3 (ref 140–450)
PMV BLD AUTO: 9.2 FL (ref 6–12)
POTASSIUM BLD-SCNC: 4.2 MMOL/L (ref 3.4–5)
PROT SERPL-MCNC: 7 G/DL (ref 6.3–8.6)
RBC # BLD AUTO: 4.2 10*6/MM3 (ref 3.77–5.28)
SODIUM BLD-SCNC: 141 MMOL/L (ref 137–145)
VIT B12 BLD-MCNC: 772 PG/ML (ref 211–946)
WBC NRBC COR # BLD: 6.19 10*3/MM3 (ref 3.4–10.8)

## 2020-01-06 PROCEDURE — 80185 ASSAY OF PHENYTOIN TOTAL: CPT | Performed by: INTERNAL MEDICINE

## 2020-01-06 PROCEDURE — 36415 COLL VENOUS BLD VENIPUNCTURE: CPT | Performed by: INTERNAL MEDICINE

## 2020-01-06 PROCEDURE — 83721 ASSAY OF BLOOD LIPOPROTEIN: CPT | Performed by: INTERNAL MEDICINE

## 2020-01-06 PROCEDURE — 85025 COMPLETE CBC W/AUTO DIFF WBC: CPT | Performed by: INTERNAL MEDICINE

## 2020-01-06 PROCEDURE — 82306 VITAMIN D 25 HYDROXY: CPT | Performed by: INTERNAL MEDICINE

## 2020-01-06 PROCEDURE — 80053 COMPREHEN METABOLIC PANEL: CPT | Performed by: INTERNAL MEDICINE

## 2020-01-06 PROCEDURE — 82728 ASSAY OF FERRITIN: CPT | Performed by: INTERNAL MEDICINE

## 2020-01-06 PROCEDURE — 83735 ASSAY OF MAGNESIUM: CPT | Performed by: INTERNAL MEDICINE

## 2020-01-06 PROCEDURE — 82607 VITAMIN B-12: CPT | Performed by: INTERNAL MEDICINE

## 2020-01-07 ENCOUNTER — OFFICE VISIT (OUTPATIENT)
Dept: FAMILY MEDICINE CLINIC | Facility: CLINIC | Age: 85
End: 2020-01-07

## 2020-01-07 VITALS
HEIGHT: 63 IN | SYSTOLIC BLOOD PRESSURE: 210 MMHG | BODY MASS INDEX: 24.63 KG/M2 | WEIGHT: 139 LBS | HEART RATE: 64 BPM | DIASTOLIC BLOOD PRESSURE: 80 MMHG | TEMPERATURE: 98.8 F

## 2020-01-07 DIAGNOSIS — G40.909 SEIZURE DISORDER (HCC): Chronic | ICD-10-CM

## 2020-01-07 DIAGNOSIS — D50.8 IRON DEFICIENCY ANEMIA SECONDARY TO INADEQUATE DIETARY IRON INTAKE: Chronic | ICD-10-CM

## 2020-01-07 DIAGNOSIS — I25.5 ISCHEMIC CARDIOMYOPATHY: Chronic | ICD-10-CM

## 2020-01-07 DIAGNOSIS — Z86.79 HISTORY OF SUSTAINED VENTRICULAR TACHYCARDIA: Chronic | ICD-10-CM

## 2020-01-07 DIAGNOSIS — E55.9 VITAMIN D DEFICIENCY: ICD-10-CM

## 2020-01-07 DIAGNOSIS — E78.2 MIXED HYPERLIPIDEMIA: Chronic | ICD-10-CM

## 2020-01-07 DIAGNOSIS — T14.8XXA HEMATOMA: ICD-10-CM

## 2020-01-07 DIAGNOSIS — M81.0 AGE-RELATED OSTEOPOROSIS WITHOUT CURRENT PATHOLOGICAL FRACTURE: ICD-10-CM

## 2020-01-07 DIAGNOSIS — F33.41 RECURRENT MAJOR DEPRESSIVE DISORDER, IN PARTIAL REMISSION (HCC): Chronic | ICD-10-CM

## 2020-01-07 DIAGNOSIS — D51.8 DIETARY VITAMIN B12 DEFICIENCY ANEMIA: Chronic | ICD-10-CM

## 2020-01-07 DIAGNOSIS — I47.29 PAROXYSMAL VENTRICULAR TACHYCARDIA (HCC): ICD-10-CM

## 2020-01-07 DIAGNOSIS — R29.6 FALL IN ELDERLY PATIENT: Chronic | ICD-10-CM

## 2020-01-07 DIAGNOSIS — H53.462 LEFT HOMONYMOUS HEMIANOPSIA: Chronic | ICD-10-CM

## 2020-01-07 DIAGNOSIS — I10 ESSENTIAL HYPERTENSION: Primary | Chronic | ICD-10-CM

## 2020-01-07 PROCEDURE — 99214 OFFICE O/P EST MOD 30 MIN: CPT | Performed by: INTERNAL MEDICINE

## 2020-01-07 RX ORDER — HYDRALAZINE HYDROCHLORIDE 50 MG/1
50 TABLET, FILM COATED ORAL 3 TIMES DAILY
Qty: 270 TABLET | Refills: 3 | Status: SHIPPED | OUTPATIENT
Start: 2020-01-07 | End: 2020-01-27

## 2020-01-07 NOTE — PROGRESS NOTES
Subjective        History of Present Illness     Neisha Duran is a 85 y.o. female who comes in for follow up on multiple complex issues including extensive vascular disease, hyperlipidemia, hypertension, carotid artery stenosis, COPD, and iron deficiency anemia among many other issues.  She has a defibrillator/pacemaker and continues on Plavix.   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 GERD symptoms adequately controlled with Protonix. She continues Dilantin and Keppra to manage seizure disorder and denies recent seizure activity. Anxiety/depression adequately managed with Effexor XR.      She denies recent falls.  DEXA dated 08/2019 revealed improvement from osteoporosis to osteopenia of the hip with normal density of the lumbar spine We had her increase her vitamin D to weekly last summer.  Vitamin D is at goal.  She continues on Prolia injections every 6 months and her dentist anticipates no need for dental extractions or other significant dental work in the near future.    She has a new problem of resolving hematoma on left knee. Denies injury or trauma.  Exam reveals mild tenderness, although, no warmth.     We treated patient for urinary tract infection last month with a course of Ceftin 250 mg twice daily for 7 days after patient was having mental status changes, which have resolved with treating the UTI.      She stopped her magnesium supplement due to loose stool even with decreasing her dose.  Her magnesium level is at goal without the supplement.      Patient has carotid artery stenosis.  Carotid Doppler dated 08/2019 reveals stable plaque    Weight is down 5 pounds in the past six months.  Blood pressure is extremely high today with Norvasc, enalapril, Betapace, and hydralazine.  Initially BP was 210/80, improved to 170/80 after resting 10 minutes and then improved to 160/82 after approximately 30 minutes.  I recommended increasing her dose of hydralazine from 25 mg to 50  "mg t.i.d.        The patient's relevant past medical, surgical, and social history was reviewed in Epic.   Lab results are reviewed with the patient today.  CBC unremarkable.  Fasting glucose 108. Normal renal and liver function.  Vitamin B-12 and vitamin D at goal. LDL 99.       Review of Systems   Constitutional: Negative for chills, fatigue and fever.   HENT: Negative for congestion, ear pain, postnasal drip, sinus pressure and sore throat.    Respiratory: Negative for cough, shortness of breath and wheezing.    Cardiovascular: Negative for chest pain, palpitations and leg swelling.   Gastrointestinal: Negative for abdominal pain, blood in stool, constipation, diarrhea, nausea and vomiting.   Endocrine: Negative for cold intolerance, heat intolerance, polydipsia and polyuria.   Genitourinary: Negative for dysuria, frequency, hematuria and urgency.   Skin: Negative for rash.   Neurological: Negative for syncope and weakness.        Objective     Visit Vitals  BP (!) 210/80 Comment: waited 10 minutes then was 170/80   Pulse 64   Temp 98.8 °F (37.1 °C) (Oral)   Ht 160 cm (63\")   Wt 63 kg (139 lb)   Breastfeeding No   BMI 24.62 kg/m²     Physical Exam   Constitutional: She is oriented to person, place, and time. She appears well-developed and well-nourished. No distress.   Accompanied by her daughter.     HENT:   Head: Normocephalic and atraumatic.   Nose: Right sinus exhibits no maxillary sinus tenderness and no frontal sinus tenderness. Left sinus exhibits no maxillary sinus tenderness and no frontal sinus tenderness.   Mouth/Throat: Uvula is midline, oropharynx is clear and moist and mucous membranes are normal. No oral lesions. No tonsillar exudate.   Eyes: Pupils are equal, round, and reactive to light. Conjunctivae and EOM are normal.   Vision loss on the left.    Neck: Trachea normal. Neck supple. No JVD present. Carotid bruit is not present. No tracheal deviation present. No thyroid mass and no thyromegaly " present.   Cardiovascular: Normal rate, regular rhythm, normal heart sounds and intact distal pulses.  No extrasystoles are present. PMI is not displaced.   No murmur heard.  Pulmonary/Chest: Effort normal and breath sounds normal. No accessory muscle usage. No respiratory distress. She has no decreased breath sounds. She has no wheezes. She has no rhonchi. She has no rales.   Abdominal: Soft. Bowel sounds are normal. She exhibits no distension. There is no hepatosplenomegaly. There is no tenderness.     Vascular Status -  Her right foot exhibits abnormal foot edema (Pulses 1+ bilateral ankles.  ). Her right foot exhibits normal foot vasculature . Her left foot exhibits abnormal foot edema. Her left foot exhibits normal foot vasculature .  Lymphadenopathy:     She has no cervical adenopathy.   Neurological: She is alert and oriented to person, place, and time. No cranial nerve deficit. Coordination normal.   Skin: Skin is warm, dry and intact. No rash noted. No cyanosis. Nails show no clubbing.   Hematoma on left knee.  Exam reveals mild tenderness, although, no warmth noted.    Psychiatric: She has a normal mood and affect. Her speech is normal and behavior is normal. Judgment and thought content normal.   Vitals reviewed.          Assessment/Plan      Increase hydralazine to 50 mg t.i.d.  Continue other blood pressure medications as prescribed.  Monitor blood pressure twice daily and keep a diary notifying me if blood pressure remains consistently above goal.         Continue vitamin D 50,000 units once weekly as well as daily calcium supplement and Prolia injections every 6 months.       Continue Zocor and dietary efforts.  Continue the Plavix.      Continue the Dilantin and Keppra to manage seizure disorder.  Continue Effexor XR for anxiety/depression.     Continue iron three times weekly and B-12 supplement 5000 units once weekly.    She can remain off of the magnesium supplement.        Return for follow up in  six months with fasting labs one week prior.       Scribed for Dr. Causey by Erin Cavazos [facility].     Diagnoses and all orders for this visit:    Essential hypertension  -     CBC Auto Differential; Future  -     Comprehensive Metabolic Panel; Future    Mixed hyperlipidemia  -     TSH; Future  -     Lipid Panel; Future    Ischemic cardiomyopathy    Paroxysmal ventricular tachycardia (CMS/HCC)    Seizure disorder (CMS/HCC)  -     Phenytoin Level, Total; Future    Dietary vitamin B12 deficiency anemia  -     Vitamin B12; Future    Fall in elderly patient    History of sustained ventricular tachycardia - S/P ICD placement    Recurrent major depressive disorder, in partial remission (CMS/HCC)    Hematoma    Iron deficiency anemia secondary to inadequate dietary iron intake  -     Ferritin; Future  -     Iron Profile; Future    Left homonymous hemianopsia    Age-related osteoporosis without current pathological fracture  -     Vitamin D 25 Hydroxy; Future    Vitamin D deficiency  -     Vitamin D 25 Hydroxy; Future    Other orders  -     Probiotic Product (PROBIOTIC DAILY PO); Take  by mouth 2 (Two) Times a Week.  -     hydrALAZINE (APRESOLINE) 50 MG tablet; Take 1 tablet by mouth 3 (Three) Times a Day.        Lab on 01/06/2020   Component Date Value Ref Range Status   • Phenytoin Level 01/06/2020 15.2  10.0 - 20.0 mcg/mL Final   • WBC 01/06/2020 6.19  3.40 - 10.80 10*3/mm3 Final   • RBC 01/06/2020 4.20  3.77 - 5.28 10*6/mm3 Final   • Hemoglobin 01/06/2020 13.4  12.0 - 15.9 g/dL Final   • Hematocrit 01/06/2020 39.9  34.0 - 46.6 % Final   • MCV 01/06/2020 95.0  79.0 - 97.0 fL Final   • MCH 01/06/2020 31.9  26.6 - 33.0 pg Final   • MCHC 01/06/2020 33.6  31.5 - 35.7 g/dL Final   • RDW 01/06/2020 12.8  12.3 - 15.4 % Final   • RDW-SD 01/06/2020 43.0  37.0 - 54.0 fl Final   • MPV 01/06/2020 9.2  6.0 - 12.0 fL Final   • Platelets 01/06/2020 205  140 - 450 10*3/mm3 Final   • Neutrophil % 01/06/2020 70.5  42.7 - 76.0 % Final    • Lymphocyte % 01/06/2020 19.1* 19.6 - 45.3 % Final   • Monocyte % 01/06/2020 7.3  5.0 - 12.0 % Final   • Eosinophil % 01/06/2020 2.3  0.3 - 6.2 % Final   • Basophil % 01/06/2020 0.8  0.0 - 1.5 % Final   • Neutrophils, Absolute 01/06/2020 4.37  1.70 - 7.00 10*3/mm3 Final   • Lymphocytes, Absolute 01/06/2020 1.18  0.70 - 3.10 10*3/mm3 Final   • Monocytes, Absolute 01/06/2020 0.45  0.10 - 0.90 10*3/mm3 Final   • Eosinophils, Absolute 01/06/2020 0.14  0.00 - 0.40 10*3/mm3 Final   • Basophils, Absolute 01/06/2020 0.05  0.00 - 0.20 10*3/mm3 Final   • Glucose 01/06/2020 108* 70 - 99 mg/dL Final   • BUN 01/06/2020 17  7 - 23 mg/dL Final   • Creatinine 01/06/2020 0.85  0.52 - 1.04 mg/dL Final   • Sodium 01/06/2020 141  137 - 145 mmol/L Final   • Potassium 01/06/2020 4.2  3.4 - 5.0 mmol/L Final   • Chloride 01/06/2020 107  101 - 112 mmol/L Final   • CO2 01/06/2020 26.0  22.0 - 30.0 mmol/L Final   • Calcium 01/06/2020 9.2  8.4 - 10.2 mg/dL Final   • Total Protein 01/06/2020 7.0  6.3 - 8.6 g/dL Final   • Albumin 01/06/2020 4.20  3.50 - 5.00 g/dL Final   • ALT (SGPT) 01/06/2020 11  <=35 U/L Final   • AST (SGOT) 01/06/2020 19  14 - 36 U/L Final   • Alkaline Phosphatase 01/06/2020 31* 38 - 126 U/L Final   • Total Bilirubin 01/06/2020 0.4  0.2 - 1.3 mg/dL Final   • eGFR Non African Amer 01/06/2020 64  39 - 90 mL/min/1.73 Final   • Globulin 01/06/2020 2.8  2.3 - 3.5 gm/dL Final   • A/G Ratio 01/06/2020 1.5  1.1 - 1.8 g/dL Final   • BUN/Creatinine Ratio 01/06/2020 20.0  7.0 - 25.0 Final   • Anion Gap 01/06/2020 8.0  5.0 - 15.0 mmol/L Final   • Ferritin 01/06/2020 34.80  13.00 - 150.00 ng/mL Final   • LDL Cholesterol  01/06/2020 99  0 - 100 mg/dL Final   • Magnesium 01/06/2020 1.9  1.6 - 2.3 mg/dL Final   • Vitamin B-12 01/06/2020 772  211 - 946 pg/mL Final   • 25 Hydroxy, Vitamin D 01/06/2020 66.3  30.0 - 100.0 ng/ml Final   Orders Only on 12/20/2019   Component Date Value Ref Range Status   • Color, UA 12/20/2019 Yellow  Yellow,  Straw Final   • Appearance, UA 12/20/2019 Cloudy* Clear Final   • pH, UA 12/20/2019 6.0  5.5 - 8.0 Final   • Specific Gravity, UA 12/20/2019 1.020  1.005 - 1.030 Final   • Glucose, UA 12/20/2019 Negative  Negative Final   • Ketones, UA 12/20/2019 Negative  Negative Final   • Bilirubin, UA 12/20/2019 Negative  Negative Final   • Blood, UA 12/20/2019 Negative  Negative Final   • Protein, UA 12/20/2019 Negative  Negative Final   • Leuk Esterase, UA 12/20/2019 Trace* Negative Final   • Nitrite, UA 12/20/2019 Positive* Negative Final   • Urobilinogen, UA 12/20/2019 0.2 E.U./dL  0.2 - 1.0 E.U./dL Final   • RBC, UA 12/20/2019 None Seen  None Seen /HPF Final   • WBC, UA 12/20/2019 6-12* None Seen /HPF Final   • Bacteria, UA 12/20/2019 4+* None Seen /HPF Final   • Squamous Epithelial Cells, UA 12/20/2019 7-12* None Seen, 0-2 /HPF Final   • Hyaline Casts, UA 12/20/2019 None Seen  None Seen /LPF Final   • Methodology 12/20/2019 Manual Light Microscopy   Final   • Urine Culture 12/20/2019 >100,000 CFU/mL Escherichia coli*  Final   ]

## 2020-01-08 DIAGNOSIS — M81.0 AGE-RELATED OSTEOPOROSIS WITHOUT CURRENT PATHOLOGICAL FRACTURE: Primary | ICD-10-CM

## 2020-01-17 ENCOUNTER — LAB (OUTPATIENT)
Dept: LAB | Facility: OTHER | Age: 85
End: 2020-01-17

## 2020-01-17 DIAGNOSIS — R41.82 ALTERED MENTAL STATUS, UNSPECIFIED ALTERED MENTAL STATUS TYPE: ICD-10-CM

## 2020-01-17 DIAGNOSIS — R41.82 ALTERED MENTAL STATUS, UNSPECIFIED ALTERED MENTAL STATUS TYPE: Primary | ICD-10-CM

## 2020-01-17 LAB
BILIRUB UR QL STRIP: NEGATIVE
CLARITY UR: CLEAR
COLOR UR: YELLOW
GLUCOSE UR STRIP-MCNC: NEGATIVE MG/DL
HGB UR QL STRIP.AUTO: NEGATIVE
KETONES UR QL STRIP: NEGATIVE
LEUKOCYTE ESTERASE UR QL STRIP.AUTO: NEGATIVE
NITRITE UR QL STRIP: NEGATIVE
PH UR STRIP.AUTO: 5.5 [PH] (ref 5.5–8)
PROT UR QL STRIP: NEGATIVE
SP GR UR STRIP: 1.02 (ref 1–1.03)
UROBILINOGEN UR QL STRIP: NORMAL

## 2020-01-17 PROCEDURE — 81003 URINALYSIS AUTO W/O SCOPE: CPT | Performed by: INTERNAL MEDICINE

## 2020-01-27 ENCOUNTER — OFFICE VISIT (OUTPATIENT)
Dept: FAMILY MEDICINE CLINIC | Facility: CLINIC | Age: 85
End: 2020-01-27

## 2020-01-27 VITALS
TEMPERATURE: 98.9 F | HEIGHT: 63 IN | HEART RATE: 60 BPM | WEIGHT: 139 LBS | DIASTOLIC BLOOD PRESSURE: 70 MMHG | BODY MASS INDEX: 24.63 KG/M2 | SYSTOLIC BLOOD PRESSURE: 168 MMHG

## 2020-01-27 DIAGNOSIS — R30.0 DYSURIA: ICD-10-CM

## 2020-01-27 DIAGNOSIS — R09.89 LABILE HYPERTENSION: Primary | ICD-10-CM

## 2020-01-27 DIAGNOSIS — F33.41 RECURRENT MAJOR DEPRESSIVE DISORDER, IN PARTIAL REMISSION (HCC): Chronic | ICD-10-CM

## 2020-01-27 DIAGNOSIS — R30.0 DYSURIA: Primary | ICD-10-CM

## 2020-01-27 LAB
BILIRUB UR QL STRIP: NEGATIVE
CLARITY UR: CLEAR
COLOR UR: YELLOW
GLUCOSE UR STRIP-MCNC: NEGATIVE MG/DL
HGB UR QL STRIP.AUTO: NEGATIVE
KETONES UR QL STRIP: NEGATIVE
LEUKOCYTE ESTERASE UR QL STRIP.AUTO: NEGATIVE
NITRITE UR QL STRIP: NEGATIVE
PH UR STRIP.AUTO: 6 [PH] (ref 5.5–8)
PROT UR QL STRIP: NEGATIVE
SP GR UR STRIP: 1.01 (ref 1–1.03)
UROBILINOGEN UR QL STRIP: NORMAL

## 2020-01-27 PROCEDURE — 81003 URINALYSIS AUTO W/O SCOPE: CPT | Performed by: INTERNAL MEDICINE

## 2020-01-27 PROCEDURE — 99213 OFFICE O/P EST LOW 20 MIN: CPT | Performed by: INTERNAL MEDICINE

## 2020-01-27 RX ORDER — HYDRALAZINE HYDROCHLORIDE 25 MG/1
25 TABLET, FILM COATED ORAL 3 TIMES DAILY
Qty: 90 TABLET | Refills: 5 | Status: SHIPPED | OUTPATIENT
Start: 2020-01-27 | End: 2020-01-27 | Stop reason: SDUPTHER

## 2020-01-27 RX ORDER — CLONIDINE 0.2 MG/24H
1 PATCH, EXTENDED RELEASE TRANSDERMAL WEEKLY
Qty: 4 EACH | Refills: 11 | Status: SHIPPED | OUTPATIENT
Start: 2020-01-27 | End: 2020-12-28

## 2020-01-27 RX ORDER — HYDRALAZINE HYDROCHLORIDE 25 MG/1
25 TABLET, FILM COATED ORAL 3 TIMES DAILY
Qty: 90 TABLET | Refills: 5 | Status: SHIPPED | OUTPATIENT
Start: 2020-01-27 | End: 2020-02-19 | Stop reason: ALTCHOICE

## 2020-01-27 NOTE — PROGRESS NOTES
Subjective        History of Present Illness     Neisha Duran is a 85 y.o. female who comes in today reporting burning prior to urination with symptoms worse at nighttime when waking her approximately 2 to 3 a.m.  The burning resolves after urination. Urinalysis is negative for UTI today. We treated patient for urinary tract infection last month with a course of Ceftin 250 mg twice daily for 7 days after patient was having mental status changes.  Culture grew E-coli.  She denies any high acidic foods, although, in discussion, she drinks cranberry juice, for which I recommended switching to the cranberry tablets.     Systolic blood pressure remains above goal today, although, vastly improved from three weeks ago when BP was 210/80.  I increased her hydralazine to 50 mg 3-4 times daily three weeks ago and had her continue on her other BP medications.  Since the increase she has noticed increased fatigue/low energy.  She brings in a diary for the past week, for which her systolic ranges 138-211 with average of 175-180 systolic.  She brought her blood pressure cuff in for calibration, which was similar to ours today.  She has not been eating well due to being worried about high sodium content in foods raising her BP.       For the past few days, she has felt tense with difficulty relaxing, although, she reports she doesn't have anything specific to worry about.  Her daughter will continue to monitor symptoms closely.  She should feel better when we can get the blood pressure under control where she can be more physically active.      Review of Systems   Constitutional: Positive for fatigue. Negative for chills and fever.   HENT: Negative for congestion, ear pain, postnasal drip, sinus pressure and sore throat.    Respiratory: Negative for cough, shortness of breath and wheezing.    Cardiovascular: Negative for chest pain, palpitations and leg swelling.   Gastrointestinal: Negative for abdominal pain, blood in stool,  "constipation, diarrhea, nausea and vomiting.   Endocrine: Negative for cold intolerance, heat intolerance, polydipsia and polyuria.   Genitourinary: Positive for difficulty urinating. Negative for dysuria, frequency, hematuria and urgency.   Skin: Negative for rash.   Neurological: Negative for syncope and weakness.        Objective     Visit Vitals  /70   Pulse 60   Temp 98.9 °F (37.2 °C) (Oral)   Ht 160 cm (63\")   Wt 63 kg (139 lb)   Breastfeeding No   BMI 24.62 kg/m²     Physical Exam   Constitutional: She is oriented to person, place, and time. She appears well-developed and well-nourished. No distress.   Accompanied by her daughter.      HENT:   Head: Normocephalic and atraumatic.   Nose: Nose normal.   Mouth/Throat: Oropharynx is clear and moist. No oropharyngeal exudate.   Eyes: Pupils are equal, round, and reactive to light. EOM are normal.   Vision loss on the left.     Neck: Neck supple. No JVD present. No thyromegaly present.   Cardiovascular: Normal rate, regular rhythm and normal heart sounds.   Pulmonary/Chest: Effort normal and breath sounds normal. No accessory muscle usage. No respiratory distress. She has no wheezes. She has no rales.   Abdominal: Soft. Bowel sounds are normal. She exhibits no distension. There is no tenderness.   Musculoskeletal: She exhibits no edema.   Lymphadenopathy:     She has no cervical adenopathy.   Neurological: She is alert and oriented to person, place, and time. No cranial nerve deficit.   Psychiatric: She has a normal mood and affect. Her speech is normal and behavior is normal. Judgment and thought content normal.     Future Appointments   Date Time Provider Department Center   2/27/2020  2:00 PM [facility]  LERO[facility] CRANE [facility]   7/10/2020  2:00 PM Keith Causey MD [facility] None         Assessment/Plan      I recommended she switch from cranberry juice to cranberry tablets.  If burning prior to urination continues after the switch to cranberry " tablets, I will send a prescription for low-dose Pyridium to use as needed.     Decrease hydralazine to 25 mg t.i.d. due to the fatigue.  Add clonidine 0.2 mg patch to change once weekly.  Continue her other blood pressure medications, enalapril and Norvasc.  Monitor heart rate closely as well as blood pressure.  Continue other blood pressure medications as prescribed.   Continue to restrict sodium.  I am reluctant to use much in the way of diuretics, but she might tolerate very low-dose HCTZ adequately.    She can keep the 50 mg hydralazine on hand to use as a rescue medication. Encouraged regular exercise to help with conditioning.  Hopefully getting her BP under better control will help with the fatigue.     Let me know if the tenseness she describes does not improve and we can discuss medication adjustment to treat.   I would likely increase her Effexor XR.    Return in July for routine follow up with fasting labs one week prior.     Scribed for Dr. Cuasey by Erin Cavazos, [facility].     Diagnoses and all orders for this visit:    Labile hypertension    Dysuria    Recurrent major depressive disorder, in partial remission (CMS/[facility])    Other orders  -     Discontinue: hydrALAZINE (APRESOLINE) 25 MG tablet; Take 1 tablet by mouth 3 (Three) Times a Day.  -     hydrALAZINE (APRESOLINE) 25 MG tablet; Take 1 tablet by mouth 3 (Three) Times a Day.  -     cloNIDine (CATAPRES-TTS) 0.2 MG/24HR patch; Place 1 patch on the skin as directed by provider 1 (One) Time Per Week.        Orders Only on 01/27/2020   Component Date Value Ref Range Status   • Color, UA 01/27/2020 Yellow  Yellow, Straw Final   • Appearance, UA 01/27/2020 Clear  Clear Final   • pH, UA 01/27/2020 6.0  5.5 - 8.0 Final   • Specific Gravity, UA 01/27/2020 1.015  1.005 - 1.030 Final   • Glucose, UA 01/27/2020 Negative  Negative Final   • Ketones, UA 01/27/2020 Negative  Negative Final   • Bilirubin, UA 01/27/2020 Negative  Negative Final   • Blood, UA  01/27/2020 Negative  Negative Final   • Protein, UA 01/27/2020 Negative  Negative Final   • Leuk Esterase, UA 01/27/2020 Negative  Negative Final   • Nitrite, UA 01/27/2020 Negative  Negative Final   • Urobilinogen, UA 01/27/2020 0.2 E.U./dL  0.2 - 1.0 E.U./dL Final   Lab on 01/17/2020   Component Date Value Ref Range Status   • Color, UA 01/17/2020 Yellow  Yellow, Straw Final   • Appearance, UA 01/17/2020 Clear  Clear Final   • pH, UA 01/17/2020 5.5  5.5 - 8.0 Final   • Specific Gravity, UA 01/17/2020 1.020  1.005 - 1.030 Final   • Glucose, UA 01/17/2020 Negative  Negative Final   • Ketones, UA 01/17/2020 Negative  Negative Final   • Bilirubin, UA 01/17/2020 Negative  Negative Final   • Blood, UA 01/17/2020 Negative  Negative Final   • Protein, UA 01/17/2020 Negative  Negative Final   • Leuk Esterase, UA 01/17/2020 Negative  Negative Final   • Nitrite, UA 01/17/2020 Negative  Negative Final   • Urobilinogen, UA 01/17/2020 0.2 E.U./dL  0.2 - 1.0 E.U./dL Final   ]

## 2020-02-19 ENCOUNTER — OFFICE VISIT (OUTPATIENT)
Dept: FAMILY MEDICINE CLINIC | Facility: CLINIC | Age: 85
End: 2020-02-19

## 2020-02-19 VITALS
TEMPERATURE: 98.6 F | HEIGHT: 63 IN | HEART RATE: 71 BPM | SYSTOLIC BLOOD PRESSURE: 120 MMHG | OXYGEN SATURATION: 98 % | BODY MASS INDEX: 23.57 KG/M2 | WEIGHT: 133 LBS | DIASTOLIC BLOOD PRESSURE: 56 MMHG

## 2020-02-19 DIAGNOSIS — R20.2 PARESTHESIA: ICD-10-CM

## 2020-02-19 DIAGNOSIS — G47.9 SLEEP DISORDER: ICD-10-CM

## 2020-02-19 DIAGNOSIS — I10 ESSENTIAL HYPERTENSION: Primary | Chronic | ICD-10-CM

## 2020-02-19 PROCEDURE — 99213 OFFICE O/P EST LOW 20 MIN: CPT | Performed by: INTERNAL MEDICINE

## 2020-02-19 RX ORDER — OLMESARTAN MEDOXOMIL 40 MG/1
40 TABLET ORAL DAILY
Qty: 30 TABLET | Refills: 11 | Status: SHIPPED | OUTPATIENT
Start: 2020-02-19 | End: 2021-02-09 | Stop reason: SDUPTHER

## 2020-02-19 NOTE — PROGRESS NOTES
Subjective        History of Present Illness     Neisha Duran is a 85 y.o. female who comes in reporting fluctuating blood pressure and other issues. She brings in a blood pressure diary for the past four days revealing systolic ranging from 138 to 196 with average 175.  Diastolics are consistently less than 80.  Heart rate is averaging around 60.  We started a Catapres patch with her last visit, but she was still above goal, so we had instructed her to go back to the higher dose of hydralazine.  Blood pressure and heart rate are easily at goal today. She reports she has been compliant with restricting sodium.  There is no obvious pattern to the elevations.  Heart rate has been acceptable.  She denies orthostatic symptoms.   Although, she slept well in the past, she has been sleeping poorly recently due to waking feeling a burning sensation, mostly of the legs and paresthesias her lower extremities, which wakes her at night. Denies restless leg symptoms.  Last night was her worst night, as she states she also had the burning sensation to a lesser extent on her back.  There has been no rash.  She denies pruritus.  Her daughter has noticed the symptoms seemed to have started or worsened after increasing her hydralazine dose.     Review of Systems   Constitutional: Negative for chills, fatigue and fever.   HENT: Negative for congestion, ear pain, postnasal drip, sinus pressure and sore throat.    Respiratory: Negative for cough, shortness of breath and wheezing.    Cardiovascular: Negative for chest pain, palpitations and leg swelling.   Gastrointestinal: Negative for abdominal pain, blood in stool, constipation, diarrhea, nausea and vomiting.   Endocrine: Negative for cold intolerance, heat intolerance, polydipsia and polyuria.   Genitourinary: Negative for dysuria, frequency, hematuria and urgency.   Skin: Negative for rash.   Neurological: Negative for syncope and weakness.        Objective     Visit Vitals  BP  "120/56   Pulse 71   Temp 98.6 °F (37 °C) (Oral)   Ht 160 cm (63\")   Wt 60.3 kg (133 lb)   SpO2 98%   BMI 23.56 kg/m²     Physical Exam   Constitutional: She is oriented to person, place, and time. She appears well-developed and well-nourished. No distress.   Accompanied by her daughter.  She looks very tired today, but also seems more anxious.   HENT:   Head: Normocephalic and atraumatic.   Nose: Nose normal.   Mouth/Throat: Oropharynx is clear and moist. No oropharyngeal exudate.   Eyes: Pupils are equal, round, and reactive to light. EOM are normal.   Neck: Neck supple. No JVD present. No thyromegaly present.   Cardiovascular: Normal rate, regular rhythm and normal heart sounds.   Pulmonary/Chest: Effort normal and breath sounds normal. No accessory muscle usage. No respiratory distress. She has no wheezes. She has no rales.   Abdominal: Soft. Bowel sounds are normal. She exhibits no distension. There is no tenderness.   Musculoskeletal: She exhibits no edema.   Lymphadenopathy:     She has no cervical adenopathy.   Neurological: She is alert and oriented to person, place, and time. No cranial nerve deficit.   Skin:   Bruising on her left wrist from wearing a Fit Bit.    Psychiatric: Her speech is normal and behavior is normal. Judgment and thought content normal.       Future Appointments   Date Time Provider Department Center   2/27/2020  2:30 PM ROOM 3 [facility]   2/27/2020  3:30 PM ROOM 3 [facility]   7/10/2020  2:00 PM Keith Causey MD [facility] None       Assessment/Plan      We will stop the hydralazine, only using for rescue medication to take 50 mg as needed for SBP >200.  Stop the enalapril (Vasotec) and start Benicar 40 mg one q.d.  Continue to monitor BP and heart rate at home.  Continue other blood pressure medications as prescribed.   Continue to restrict sodium.  Notify me if BP is not consistently at goal or if the paresthesias do not improve.  HCTZ is listed as a " drug allergy, so we will need to avoid that option.  She might tolerate low-dose isosorbide mononitrate.    If the current apparent anxiety does not improve with improvement in sleep, I would like to consider adding a low dose of Seroquel at night.  She has been on relatively low-dose Effexor XR for many years.       Return in July for routine follow up with fasting labs one week prior or sooner if needed.     Scribed for Dr. Causey by Erin Cavazos [facility].     Diagnoses and all orders for this visit:    Essential hypertension    Paresthesia    Sleep disorder    Other orders  -     olmesartan (BENICAR) 40 MG tablet; Take 1 tablet by mouth Daily.        No visits with results within 3 Week(s) from this visit.   Latest known visit with results is:   Orders Only on 01/27/2020   Component Date Value Ref Range Status   • Color, UA 01/27/2020 Yellow  Yellow, Straw Final   • Appearance, UA 01/27/2020 Clear  Clear Final   • pH, UA 01/27/2020 6.0  5.5 - 8.0 Final   • Specific Gravity, UA 01/27/2020 1.015  1.005 - 1.030 Final   • Glucose, UA 01/27/2020 Negative  Negative Final   • Ketones, UA 01/27/2020 Negative  Negative Final   • Bilirubin, UA 01/27/2020 Negative  Negative Final   • Blood, UA 01/27/2020 Negative  Negative Final   • Protein, UA 01/27/2020 Negative  Negative Final   • Leuk Esterase, UA 01/27/2020 Negative  Negative Final   • Nitrite, UA 01/27/2020 Negative  Negative Final   • Urobilinogen, UA 01/27/2020 0.2 E.U./dL  0.2 - 1.0 E.U./dL Final   ]

## 2020-02-21 DIAGNOSIS — M81.0 AGE-RELATED OSTEOPOROSIS WITHOUT CURRENT PATHOLOGICAL FRACTURE: Primary | Chronic | ICD-10-CM

## 2020-02-27 ENCOUNTER — APPOINTMENT (OUTPATIENT)
Dept: INFUSION THERAPY | Facility: HOSPITAL | Age: 85
End: 2020-02-27

## 2020-02-27 ENCOUNTER — APPOINTMENT (OUTPATIENT)
Dept: ONCOLOGY | Facility: HOSPITAL | Age: 85
End: 2020-02-27

## 2020-03-16 RX ORDER — CLOPIDOGREL BISULFATE 75 MG/1
TABLET ORAL
Qty: 30 TABLET | Refills: 11 | Status: SHIPPED | OUTPATIENT
Start: 2020-03-16 | End: 2021-03-12 | Stop reason: SDUPTHER

## 2020-03-17 ENCOUNTER — PRIOR AUTHORIZATION (OUTPATIENT)
Dept: FAMILY MEDICINE CLINIC | Facility: CLINIC | Age: 85
End: 2020-03-17

## 2020-04-13 ENCOUNTER — OFFICE VISIT (OUTPATIENT)
Dept: FAMILY MEDICINE CLINIC | Facility: CLINIC | Age: 85
End: 2020-04-13

## 2020-04-13 VITALS — HEART RATE: 51 BPM | SYSTOLIC BLOOD PRESSURE: 166 MMHG | RESPIRATION RATE: 16 BRPM | DIASTOLIC BLOOD PRESSURE: 74 MMHG

## 2020-04-13 DIAGNOSIS — G47.9 SLEEP DISORDER: ICD-10-CM

## 2020-04-13 DIAGNOSIS — R00.1 BRADYCARDIA: ICD-10-CM

## 2020-04-13 DIAGNOSIS — R20.2 PARESTHESIA: ICD-10-CM

## 2020-04-13 DIAGNOSIS — I10 ESSENTIAL HYPERTENSION: Chronic | ICD-10-CM

## 2020-04-13 DIAGNOSIS — R09.89 LABILE HYPERTENSION: Primary | ICD-10-CM

## 2020-04-13 PROCEDURE — G2025 DIS SITE TELE SVCS RHC/FQHC: HCPCS | Performed by: INTERNAL MEDICINE

## 2020-04-13 RX ORDER — HYDRALAZINE HYDROCHLORIDE 25 MG/1
TABLET, FILM COATED ORAL
Qty: 120 TABLET | Refills: 11 | Status: SHIPPED | OUTPATIENT
Start: 2020-04-13 | End: 2020-07-27

## 2020-04-13 NOTE — PROGRESS NOTES
Subjective     Neisha Duran is a 85 y.o. female.     History of Present Illness     You have chosen to receive care through a telephone visit. Do you consent to use a telephone visit for your medical care today? Yes  Telephone time: 13 minutes    Neisha and her daughter report poor blood pressure control with the current medications.  They are monitoring blood pressure twice daily.  Systolic pressure in the morning is typically in the 160s.  Readings in the evening are very widely variable.  On nearly 50% of the nights, she awakens around 3 AM with a burning sensation of the bilateral legs.  She checks her blood pressure at that time and is frequently seeing systolic pressure greater than 200.  She takes hydralazine 25 mg for rescue, and it is usually better by the following morning.  She denies classic neuropathy symptoms.  She denies uncontrolled anxiety at night when she is experiencing the symptoms and checking her blood pressure.  She continues on Effexor XR.      Review of Systems   Constitutional: Negative for chills, fatigue and fever.   HENT: Negative for congestion, ear pain, postnasal drip, sinus pressure and sore throat.    Respiratory: Negative for cough, shortness of breath and wheezing.    Cardiovascular: Negative for chest pain, palpitations and leg swelling.   Gastrointestinal: Negative for abdominal pain, blood in stool, constipation, diarrhea, nausea and vomiting.   Endocrine: Negative for cold intolerance, heat intolerance, polydipsia and polyuria.   Genitourinary: Negative for dysuria, frequency, hematuria and urgency.   Skin: Negative for rash.   Neurological: Negative for syncope and weakness.       Objective     /74   Pulse 51   Resp 16     Physical Exam    PHQ-2/PHQ-9 Depression Screening 6/21/2019   Little interest or pleasure in doing things 0   Feeling down, depressed, or hopeless 0   Trouble falling or staying asleep, or sleeping too much -   Feeling tired or having little  energy -   Poor appetite or overeating -   Feeling bad about yourself - or that you are a failure or have let yourself or your family down -   Trouble concentrating on things, such as reading the newspaper or watching television -   Moving or speaking so slowly that other people could have noticed. Or the opposite - being so fidgety or restless that you have been moving around a lot more than usual -   Thoughts that you would be better off dead, or of hurting yourself in some way -   Total Score 0   If you checked off any problems, how difficult have these problems made it for you to do your work, take care of things at home, or get along with other people? -       Assessment/Plan     Continue Benicar 40 mg nightly and Norvasc 5 mg twice daily.  Continue clonidine patch weekly.  I cannot increase the clonidine patch further due to her bradycardia.  She is on sotalol for rhythm control.  Resume hydralazine 25 mg in a.m. and 50 mg in evening.  She may continue to also use hydralazine as a rescue medication when systolic blood pressure is greater than 175.    Although she denies significant anxiety at night, I wonder if a low-dose of Seroquel at night might be helpful.  Neurontin at bedtime is also a possibility to help with the paresthesias/burning of the legs that she describes, although she feels that it is only present when her blood pressure is markedly elevated.  I would also have to watch closely for side effects if we start Neurontin.    Continue to monitor blood pressure twice daily and also continue to check it if she wakes in the middle of the night.  Call us in 1 week with blood pressure results.    Diagnoses and all orders for this visit:    Labile hypertension    Essential hypertension    Sleep disorder    Paresthesia    Bradycardia    Other orders  -     hydrALAZINE (APRESOLINE) 25 MG tablet; 1 tablet in a.m. and 2 tablets in the evening.  May also use an additional tablet as needed for elevated blood  pressure.        No visits with results within 3 Week(s) from this visit.   Latest known visit with results is:   Orders Only on 01/27/2020   Component Date Value Ref Range Status   • Color, UA 01/27/2020 Yellow  Yellow, Straw Final   • Appearance, UA 01/27/2020 Clear  Clear Final   • pH, UA 01/27/2020 6.0  5.5 - 8.0 Final   • Specific Gravity, UA 01/27/2020 1.015  1.005 - 1.030 Final   • Glucose, UA 01/27/2020 Negative  Negative Final   • Ketones, UA 01/27/2020 Negative  Negative Final   • Bilirubin, UA 01/27/2020 Negative  Negative Final   • Blood, UA 01/27/2020 Negative  Negative Final   • Protein, UA 01/27/2020 Negative  Negative Final   • Leuk Esterase, UA 01/27/2020 Negative  Negative Final   • Nitrite, UA 01/27/2020 Negative  Negative Final   • Urobilinogen, UA 01/27/2020 0.2 E.U./dL  0.2 - 1.0 E.U./dL Final   ]

## 2020-05-04 RX ORDER — SOTALOL HYDROCHLORIDE 80 MG/1
TABLET ORAL
Qty: 60 TABLET | Refills: 11 | Status: SHIPPED | OUTPATIENT
Start: 2020-05-04 | End: 2021-05-25 | Stop reason: SDUPTHER

## 2020-05-04 RX ORDER — VENLAFAXINE HYDROCHLORIDE 75 MG/1
CAPSULE, EXTENDED RELEASE ORAL
Qty: 30 CAPSULE | Refills: 11 | Status: SHIPPED | OUTPATIENT
Start: 2020-05-04 | End: 2021-05-17 | Stop reason: SDUPTHER

## 2020-05-15 RX ORDER — PHENYTOIN SODIUM 100 MG/1
300 CAPSULE, EXTENDED RELEASE ORAL NIGHTLY
Qty: 90 CAPSULE | Refills: 11 | Status: SHIPPED | OUTPATIENT
Start: 2020-05-15 | End: 2021-05-17 | Stop reason: SDUPTHER

## 2020-05-27 RX ORDER — SIMVASTATIN 40 MG
TABLET ORAL
Qty: 90 TABLET | Refills: 3 | Status: SHIPPED | OUTPATIENT
Start: 2020-05-27 | End: 2021-06-02 | Stop reason: SDUPTHER

## 2020-06-05 RX ORDER — AMLODIPINE BESYLATE 5 MG/1
5 TABLET ORAL 2 TIMES DAILY
Qty: 60 TABLET | Refills: 11 | Status: SHIPPED | OUTPATIENT
Start: 2020-06-05 | End: 2021-06-09 | Stop reason: SDUPTHER

## 2020-06-08 ENCOUNTER — OFFICE VISIT (OUTPATIENT)
Dept: FAMILY MEDICINE CLINIC | Facility: CLINIC | Age: 85
End: 2020-06-08

## 2020-06-08 VITALS
HEIGHT: 63 IN | BODY MASS INDEX: 23.57 KG/M2 | DIASTOLIC BLOOD PRESSURE: 70 MMHG | HEART RATE: 52 BPM | WEIGHT: 133 LBS | SYSTOLIC BLOOD PRESSURE: 144 MMHG

## 2020-06-08 DIAGNOSIS — I10 ESSENTIAL HYPERTENSION: Chronic | ICD-10-CM

## 2020-06-08 DIAGNOSIS — R20.2 PARESTHESIA: ICD-10-CM

## 2020-06-08 DIAGNOSIS — R00.1 BRADYCARDIA: ICD-10-CM

## 2020-06-08 DIAGNOSIS — R09.89 LABILE HYPERTENSION: Primary | ICD-10-CM

## 2020-06-08 DIAGNOSIS — G47.9 SLEEP DISORDER: Chronic | ICD-10-CM

## 2020-06-08 PROCEDURE — G2025 DIS SITE TELE SVCS RHC/FQHC: HCPCS | Performed by: INTERNAL MEDICINE

## 2020-06-08 NOTE — PROGRESS NOTES
Subjective        History of Present Illness     You have chosen to receive care through a telephone visit. Do you consent to use a telephone visit for your medical care today? Yes Total visit time 15 minutes.      Neisha Duran is a 85 y.o. female who reports poor blood pressure control with the current medications, especially at nighttime.  She has a normal baseline range of 140-170, although, she has episodes of systolic greater than 200, normally occurring from midnight to 5:00 a.m.  She is able to get to sleep, but wakes with a burning sensation in the feet going up to her back.  She has noticed a pattern where the extreme elevations seem to occur the first day or two after changing her clonidine patch and settles down about the third day.  Her BP tends to run higher when she does not sleep well at night.  She continues taking hydralazine 25 mg in the a.m., and 50 mg in the evening, and also using as a rescue medication for systolic greater than 175 as well as Benicar 40 mg nightly, Norvasc 5 mg twice daily, and weekly clonidine patch.  She is on sotalol for rhythm control.      She continues to have 1 or 2 nights per week in which she wakes around midnight and has episodic burning sensation in the legs.  These are the nights in which she checks her blood pressure in the middle of the night and is far above goal.  I still have been unable to determine the etiology of these nights.  I suspect there is some anxiety driving her blood pressure higher on those evenings, but this is not confirmed.  I am reluctant to give sleep aids that might increase her fall risk or produce morning drowsiness.      Review of Systems   Constitutional: Negative for chills, fatigue and fever.   HENT: Negative for congestion, ear pain, postnasal drip, sinus pressure and sore throat.    Respiratory: Negative for cough, shortness of breath and wheezing.    Cardiovascular: Negative for chest pain, palpitations and leg swelling.  "  Gastrointestinal: Negative for abdominal pain, blood in stool, constipation, diarrhea, nausea and vomiting.   Endocrine: Negative for cold intolerance, heat intolerance, polydipsia and polyuria.   Genitourinary: Negative for dysuria, frequency, hematuria and urgency.   Skin: Negative for rash.   Neurological: Negative for syncope and weakness.        Objective     Visit Vitals  /70   Pulse 52   Ht 160 cm (63\")   Wt 60.3 kg (133 lb)   BMI 23.56 kg/m²     Physical Exam    Future Appointments   Date Time Provider Department Center   7/10/2020  2:00 PM Keith Causey MD [facility] None       Assessment/Plan      I recommended she go ahead and apply the new clonidine patch while leaving on the current patch for a day before removing.  Monitor heart rate closely due to her bradycardia.  I also recommended she take 50 mg hydralazine as a rescue medication for SBP greater than 170 instead of the current 25 mg dose.  Continue Benicar 40 mg nightly and Norvasc 5 mg twice daily.  Continue the sotalol for rhythm control.  Continue to monitor heart rate also due to the bradycardia which she is tolerating well thus far.  The bradycardia limits how much clonidine we can use.    Continue to monitor her sleep difficulties and episodic nighttime paresthesia/burning sensations .  No definitive etiology has been found.  See above.    Return next month for routine follow up with fasting labs one week prior or sooner if needed.     Diagnoses and all orders for this visit:    Labile hypertension    Sleep disorder    Essential hypertension    Paresthesia    Bradycardia        No visits with results within 3 Week(s) from this visit.   Latest known visit with results is:   Orders Only on 01/27/2020   Component Date Value Ref Range Status   • Color,  01/27/2020 Yellow  Yellow, Straw Final   • Appearance,  01/27/2020 Clear  Clear Final   • pH,  01/27/2020 6.0  5.5 - 8.0 Final   • Specific Gravity,  01/27/2020 1.015  1.005 - " 1.030 Final   • Glucose, UA 01/27/2020 Negative  Negative Final   • Ketones, UA 01/27/2020 Negative  Negative Final   • Bilirubin, UA 01/27/2020 Negative  Negative Final   • Blood, UA 01/27/2020 Negative  Negative Final   • Protein, UA 01/27/2020 Negative  Negative Final   • Leuk Esterase, UA 01/27/2020 Negative  Negative Final   • Nitrite, UA 01/27/2020 Negative  Negative Final   • Urobilinogen, UA 01/27/2020 0.2 E.U./dL  0.2 - 1.0 E.U./dL Final   ]

## 2020-07-01 DIAGNOSIS — R30.0 DYSURIA: Primary | ICD-10-CM

## 2020-07-01 RX ORDER — ERGOCALCIFEROL 1.25 MG/1
50000 CAPSULE ORAL
Qty: 13 CAPSULE | Refills: 3 | Status: SHIPPED | OUTPATIENT
Start: 2020-07-01 | End: 2021-07-09 | Stop reason: SDUPTHER

## 2020-07-02 ENCOUNTER — LAB (OUTPATIENT)
Dept: LAB | Facility: OTHER | Age: 85
End: 2020-07-02

## 2020-07-02 DIAGNOSIS — E78.2 MIXED HYPERLIPIDEMIA: Chronic | ICD-10-CM

## 2020-07-02 DIAGNOSIS — D51.8 DIETARY VITAMIN B12 DEFICIENCY ANEMIA: Chronic | ICD-10-CM

## 2020-07-02 DIAGNOSIS — G40.909 SEIZURE DISORDER (HCC): Chronic | ICD-10-CM

## 2020-07-02 DIAGNOSIS — E55.9 VITAMIN D DEFICIENCY: ICD-10-CM

## 2020-07-02 DIAGNOSIS — D50.8 IRON DEFICIENCY ANEMIA SECONDARY TO INADEQUATE DIETARY IRON INTAKE: Chronic | ICD-10-CM

## 2020-07-02 DIAGNOSIS — M81.0 AGE-RELATED OSTEOPOROSIS WITHOUT CURRENT PATHOLOGICAL FRACTURE: ICD-10-CM

## 2020-07-02 DIAGNOSIS — I10 ESSENTIAL HYPERTENSION: Chronic | ICD-10-CM

## 2020-07-02 LAB
25(OH)D3 SERPL-MCNC: 63.6 NG/ML (ref 30–100)
ALBUMIN SERPL-MCNC: 4.2 G/DL (ref 3.5–5)
ALBUMIN/GLOB SERPL: 1.4 G/DL (ref 1.1–1.8)
ALP SERPL-CCNC: 42 U/L (ref 38–126)
ALT SERPL W P-5'-P-CCNC: 11 U/L
ANION GAP SERPL CALCULATED.3IONS-SCNC: 8 MMOL/L (ref 5–15)
AST SERPL-CCNC: 23 U/L (ref 14–36)
BACTERIA UR QL AUTO: ABNORMAL /HPF
BASOPHILS # BLD AUTO: 0.04 10*3/MM3 (ref 0–0.2)
BASOPHILS NFR BLD AUTO: 0.7 % (ref 0–1.5)
BILIRUB SERPL-MCNC: 0.6 MG/DL (ref 0.2–1.3)
BILIRUB UR QL STRIP: NEGATIVE
BUN SERPL-MCNC: 23 MG/DL (ref 7–23)
BUN/CREAT SERPL: 24 (ref 7–25)
CALCIUM SPEC-SCNC: 9.7 MG/DL (ref 8.4–10.2)
CHLORIDE SERPL-SCNC: 105 MMOL/L (ref 101–112)
CHOLEST SERPL-MCNC: 192 MG/DL (ref 150–200)
CLARITY UR: ABNORMAL
CO2 SERPL-SCNC: 24 MMOL/L (ref 22–30)
COLOR UR: YELLOW
CREAT SERPL-MCNC: 0.96 MG/DL (ref 0.52–1.04)
DEPRECATED RDW RBC AUTO: 41.9 FL (ref 37–54)
EOSINOPHIL # BLD AUTO: 0.15 10*3/MM3 (ref 0–0.4)
EOSINOPHIL NFR BLD AUTO: 2.8 % (ref 0.3–6.2)
ERYTHROCYTE [DISTWIDTH] IN BLOOD BY AUTOMATED COUNT: 12.3 % (ref 12.3–15.4)
FERRITIN SERPL-MCNC: 58.7 NG/ML (ref 13–150)
GFR SERPL CREATININE-BSD FRML MDRD: 55 ML/MIN/1.73 (ref 39–90)
GLOBULIN UR ELPH-MCNC: 3 GM/DL (ref 2.3–3.5)
GLUCOSE SERPL-MCNC: 104 MG/DL (ref 70–99)
GLUCOSE UR STRIP-MCNC: NEGATIVE MG/DL
HCT VFR BLD AUTO: 37.9 % (ref 34–46.6)
HDLC SERPL-MCNC: 87 MG/DL (ref 40–59)
HGB BLD-MCNC: 12.9 G/DL (ref 12–15.9)
HGB UR QL STRIP.AUTO: NEGATIVE
HYALINE CASTS UR QL AUTO: ABNORMAL /LPF
IRON 24H UR-MRATE: 128 MCG/DL (ref 37–145)
IRON SATN MFR SERPL: 35 % (ref 20–50)
KETONES UR QL STRIP: NEGATIVE
LDLC SERPL CALC-MCNC: 79 MG/DL
LDLC/HDLC SERPL: 0.91 {RATIO} (ref 0–3.22)
LEUKOCYTE ESTERASE UR QL STRIP.AUTO: ABNORMAL
LYMPHOCYTES # BLD AUTO: 1.24 10*3/MM3 (ref 0.7–3.1)
LYMPHOCYTES NFR BLD AUTO: 23.1 % (ref 19.6–45.3)
MCH RBC QN AUTO: 32.8 PG (ref 26.6–33)
MCHC RBC AUTO-ENTMCNC: 34 G/DL (ref 31.5–35.7)
MCV RBC AUTO: 96.4 FL (ref 79–97)
MONOCYTES # BLD AUTO: 0.5 10*3/MM3 (ref 0.1–0.9)
MONOCYTES NFR BLD AUTO: 9.3 % (ref 5–12)
NEUTROPHILS NFR BLD AUTO: 3.43 10*3/MM3 (ref 1.7–7)
NEUTROPHILS NFR BLD AUTO: 64.1 % (ref 42.7–76)
NITRITE UR QL STRIP: POSITIVE
PH UR STRIP.AUTO: 5.5 [PH] (ref 5.5–8)
PHENYTOIN SERPL-MCNC: 16.1 MCG/ML (ref 10–20)
PLATELET # BLD AUTO: 224 10*3/MM3 (ref 140–450)
PMV BLD AUTO: 9.6 FL (ref 6–12)
POTASSIUM SERPL-SCNC: 4.9 MMOL/L (ref 3.4–5)
PROT SERPL-MCNC: 7.2 G/DL (ref 6.3–8.6)
PROT UR QL STRIP: NEGATIVE
RBC # BLD AUTO: 3.93 10*6/MM3 (ref 3.77–5.28)
RBC # UR: ABNORMAL /HPF
REF LAB TEST METHOD: ABNORMAL
SODIUM SERPL-SCNC: 137 MMOL/L (ref 137–145)
SP GR UR STRIP: 1.02 (ref 1–1.03)
SQUAMOUS #/AREA URNS HPF: ABNORMAL /HPF
TIBC SERPL-MCNC: 365 MCG/DL (ref 298–536)
TRANSFERRIN SERPL-MCNC: 245 MG/DL (ref 200–360)
TRIGL SERPL-MCNC: 128 MG/DL
TSH SERPL DL<=0.05 MIU/L-ACNC: 3.65 UIU/ML (ref 0.27–4.2)
UROBILINOGEN UR QL STRIP: ABNORMAL
VIT B12 BLD-MCNC: >2000 PG/ML (ref 211–946)
VLDLC SERPL-MCNC: 25.6 MG/DL
WBC # BLD AUTO: 5.36 10*3/MM3 (ref 3.4–10.8)
WBC UR QL AUTO: ABNORMAL /HPF

## 2020-07-02 PROCEDURE — 82607 VITAMIN B-12: CPT | Performed by: INTERNAL MEDICINE

## 2020-07-02 PROCEDURE — 80053 COMPREHEN METABOLIC PANEL: CPT | Performed by: INTERNAL MEDICINE

## 2020-07-02 PROCEDURE — 84443 ASSAY THYROID STIM HORMONE: CPT | Performed by: INTERNAL MEDICINE

## 2020-07-02 PROCEDURE — 87186 SC STD MICRODIL/AGAR DIL: CPT | Performed by: INTERNAL MEDICINE

## 2020-07-02 PROCEDURE — 80185 ASSAY OF PHENYTOIN TOTAL: CPT | Performed by: INTERNAL MEDICINE

## 2020-07-02 PROCEDURE — 82306 VITAMIN D 25 HYDROXY: CPT | Performed by: INTERNAL MEDICINE

## 2020-07-02 PROCEDURE — 82728 ASSAY OF FERRITIN: CPT | Performed by: INTERNAL MEDICINE

## 2020-07-02 PROCEDURE — 83540 ASSAY OF IRON: CPT | Performed by: INTERNAL MEDICINE

## 2020-07-02 PROCEDURE — 84466 ASSAY OF TRANSFERRIN: CPT | Performed by: INTERNAL MEDICINE

## 2020-07-02 PROCEDURE — 87088 URINE BACTERIA CULTURE: CPT | Performed by: INTERNAL MEDICINE

## 2020-07-02 PROCEDURE — 81001 URINALYSIS AUTO W/SCOPE: CPT | Performed by: INTERNAL MEDICINE

## 2020-07-02 PROCEDURE — 85025 COMPLETE CBC W/AUTO DIFF WBC: CPT | Performed by: INTERNAL MEDICINE

## 2020-07-02 PROCEDURE — 87086 URINE CULTURE/COLONY COUNT: CPT | Performed by: INTERNAL MEDICINE

## 2020-07-02 PROCEDURE — 80061 LIPID PANEL: CPT | Performed by: INTERNAL MEDICINE

## 2020-07-02 PROCEDURE — 36415 COLL VENOUS BLD VENIPUNCTURE: CPT | Performed by: INTERNAL MEDICINE

## 2020-07-02 RX ORDER — CEFUROXIME AXETIL 250 MG/1
250 TABLET ORAL 2 TIMES DAILY
Qty: 14 TABLET | Refills: 0 | Status: SHIPPED | OUTPATIENT
Start: 2020-07-02 | End: 2020-07-10

## 2020-07-04 LAB — BACTERIA SPEC AEROBE CULT: ABNORMAL

## 2020-07-10 ENCOUNTER — OFFICE VISIT (OUTPATIENT)
Dept: FAMILY MEDICINE CLINIC | Facility: CLINIC | Age: 85
End: 2020-07-10

## 2020-07-10 VITALS
HEIGHT: 63 IN | HEART RATE: 58 BPM | BODY MASS INDEX: 23.57 KG/M2 | DIASTOLIC BLOOD PRESSURE: 71 MMHG | WEIGHT: 133 LBS | SYSTOLIC BLOOD PRESSURE: 170 MMHG

## 2020-07-10 DIAGNOSIS — G40.909 SEIZURE DISORDER (HCC): Chronic | ICD-10-CM

## 2020-07-10 DIAGNOSIS — I10 ESSENTIAL HYPERTENSION: Primary | Chronic | ICD-10-CM

## 2020-07-10 DIAGNOSIS — I25.5 ISCHEMIC CARDIOMYOPATHY: Chronic | ICD-10-CM

## 2020-07-10 DIAGNOSIS — D50.8 IRON DEFICIENCY ANEMIA SECONDARY TO INADEQUATE DIETARY IRON INTAKE: Chronic | ICD-10-CM

## 2020-07-10 DIAGNOSIS — F33.41 RECURRENT MAJOR DEPRESSIVE DISORDER, IN PARTIAL REMISSION (HCC): Chronic | ICD-10-CM

## 2020-07-10 DIAGNOSIS — D51.8 DIETARY VITAMIN B12 DEFICIENCY ANEMIA: Chronic | ICD-10-CM

## 2020-07-10 DIAGNOSIS — E78.2 MIXED HYPERLIPIDEMIA: Chronic | ICD-10-CM

## 2020-07-10 DIAGNOSIS — J43.1 PANLOBULAR EMPHYSEMA (HCC): Chronic | ICD-10-CM

## 2020-07-10 DIAGNOSIS — E55.9 VITAMIN D DEFICIENCY: Chronic | ICD-10-CM

## 2020-07-10 DIAGNOSIS — M81.0 AGE-RELATED OSTEOPOROSIS WITHOUT CURRENT PATHOLOGICAL FRACTURE: Chronic | ICD-10-CM

## 2020-07-10 DIAGNOSIS — N39.0 URINARY TRACT INFECTION WITHOUT HEMATURIA, SITE UNSPECIFIED: ICD-10-CM

## 2020-07-10 PROCEDURE — G2025 DIS SITE TELE SVCS RHC/FQHC: HCPCS | Performed by: INTERNAL MEDICINE

## 2020-07-10 NOTE — PROGRESS NOTES
Subjective     History of Present Illness     You have chosen to receive care through a telephone visit. Do you consent to use a telephone visit for your medical care today? Yes   Total visit time: 23 minutes.     Neisha Duran is a 85 y.o. female who receives care via telephone visit for multiple complex issues including extensive vascular disease, hyperlipidemia, hypertension, carotid artery stenosis, COPD, and iron deficiency anemia among many other issues.  She has a defibrillator/pacemaker and continues on Plavix.   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  She continues on Dilantin and Keppra for seizure disorder and denies any recent seizure activity.  Recent UTI cleared with 7-day course of Ceftin.  She fell in the grass a few days ago while picking peppers in the garden, but denies any other recent falls.       She is due Pneumovax, for which patient can stop by the office at her convenience.  She hasn't had influenza vaccine in the past 20 years after she was convinced the last one may have been associated with CVA.        She reports stable weight.  Blood pressure at her baseline.      DEXA dated 08/2019 revealed improvement from osteoporosis to osteopenia of the hip with normal density of the lumbar spine.  She is overdue Prolia injection.  They are giving the injections at [facility] and patient has tried to avoid going into the hospital due to the Covid-19 pandemic. We are going to send a prescription to her pharmacy to see if we can get the Prolia covered and have her bring into the office to be administered.        B-12 level is higher than necessary with oral B-12 5000 mcg weekly, for which I recommended she decrease to 1/2 tablet daily.     The patient's relevant past medical, surgical, and social history was reviewed in Epic.   Lab results are reviewed with the patient today.  CBC unremarkable. Fasting glucose 104.  Normal renal and liver  "function.  Total cholesterol 192.  HDL excellent at 87.  LDL 79.  Triglycerides 128.  LDL/HDL ratio 0.91.        Review of Systems   Constitutional: Negative for chills, fatigue and fever.   HENT: Negative for congestion, ear pain, postnasal drip, sinus pressure and sore throat.    Respiratory: Negative for cough, shortness of breath and wheezing.    Cardiovascular: Negative for chest pain, palpitations and leg swelling.   Gastrointestinal: Negative for abdominal pain, blood in stool, constipation, diarrhea, nausea and vomiting.   Endocrine: Negative for cold intolerance, heat intolerance, polydipsia and polyuria.   Genitourinary: Negative for dysuria, frequency, hematuria and urgency.   Skin: Negative for rash.   Neurological: Negative for syncope and weakness.        Objective     Visit Vitals  /71   Pulse 58   Ht 160 cm (63\")   Wt 60.3 kg (133 lb)   BMI 23.56 kg/m²     Physical Exam        Assessment/Plan      Decrease vitamin B-12 to 2500 mcg weekly. Continue iron three times weekly    Continue blood pressure medications as prescribed.  Monitor blood pressure notifying me if blood pressure remains consistently above her baseline.          Continue vitamin D 50,000 units once weekly as well as daily calcium supplement and Prolia injections.  We will send a prescription to her pharmacy to see if we can get the Prolia covered to be administered in our office to lessen risk of exposure to Covid-19.       Continue Zocor and dietary efforts.  Continue the Plavix.       Continue the Dilantin and Keppra to manage seizure disorder.  Continue Effexor XR for anxiety/depression.      Return for follow up in six months with fasting labs one week prior.       Scribed for Dr. Causey by Erin Cavazos [facility].     Diagnoses and all orders for this visit:    Essential hypertension  -     CBC Auto Differential; Future  -     Comprehensive Metabolic Panel; Future    Mixed hyperlipidemia  -     LDL Cholesterol, Direct; " Future    Ischemic cardiomyopathy    Panlobular emphysema (CMS/HCC)    Vitamin D deficiency  -     Vitamin D 25 Hydroxy; Future    Seizure disorder (CMS/[facility])  -     Phenytoin Level, Total; Future    Urinary tract infection without hematuria, site unspecified    Dietary vitamin B12 deficiency anemia  -     Vitamin B12; Future    Iron deficiency anemia secondary to inadequate dietary iron intake  -     Ferritin; Future    Recurrent major depressive disorder, in partial remission (CMS/[facility])    Age-related osteoporosis without current pathological fracture        Lab on 07/02/2020   Component Date Value Ref Range Status   • Phenytoin Level 07/02/2020 16.1  10.0 - 20.0 mcg/mL Final   • WBC 07/02/2020 5.36  3.40 - 10.80 10*3/mm3 Final   • RBC 07/02/2020 3.93  3.77 - 5.28 10*6/mm3 Final   • Hemoglobin 07/02/2020 12.9  12.0 - 15.9 g/dL Final   • Hematocrit 07/02/2020 37.9  34.0 - 46.6 % Final   • MCV 07/02/2020 96.4  79.0 - 97.0 fL Final   • MCH 07/02/2020 32.8  26.6 - 33.0 pg Final   • MCHC 07/02/2020 34.0  31.5 - 35.7 g/dL Final   • RDW 07/02/2020 12.3  12.3 - 15.4 % Final   • RDW-SD 07/02/2020 41.9  37.0 - 54.0 fl Final   • MPV 07/02/2020 9.6  6.0 - 12.0 fL Final   • Platelets 07/02/2020 224  140 - 450 10*3/mm3 Final   • Neutrophil % 07/02/2020 64.1  42.7 - 76.0 % Final   • Lymphocyte % 07/02/2020 23.1  19.6 - 45.3 % Final   • Monocyte % 07/02/2020 9.3  5.0 - 12.0 % Final   • Eosinophil % 07/02/2020 2.8  0.3 - 6.2 % Final   • Basophil % 07/02/2020 0.7  0.0 - 1.5 % Final   • Neutrophils, Absolute 07/02/2020 3.43  1.70 - 7.00 10*3/mm3 Final   • Lymphocytes, Absolute 07/02/2020 1.24  0.70 - 3.10 10*3/mm3 Final   • Monocytes, Absolute 07/02/2020 0.50  0.10 - 0.90 10*3/mm3 Final   • Eosinophils, Absolute 07/02/2020 0.15  0.00 - 0.40 10*3/mm3 Final   • Basophils, Absolute 07/02/2020 0.04  0.00 - 0.20 10*3/mm3 Final   • Glucose 07/02/2020 104* 70 - 99 mg/dL Final   • BUN 07/02/2020 23  7 - 23 mg/dL Final   • Creatinine  07/02/2020 0.96  0.52 - 1.04 mg/dL Final   • Sodium 07/02/2020 137  137 - 145 mmol/L Final   • Potassium 07/02/2020 4.9  3.4 - 5.0 mmol/L Final   • Chloride 07/02/2020 105  101 - 112 mmol/L Final   • CO2 07/02/2020 24.0  22.0 - 30.0 mmol/L Final   • Calcium 07/02/2020 9.7  8.4 - 10.2 mg/dL Final   • Total Protein 07/02/2020 7.2  6.3 - 8.6 g/dL Final   • Albumin 07/02/2020 4.20  3.50 - 5.00 g/dL Final   • ALT (SGPT) 07/02/2020 11  <=35 U/L Final   • AST (SGOT) 07/02/2020 23  14 - 36 U/L Final   • Alkaline Phosphatase 07/02/2020 42  38 - 126 U/L Final   • Total Bilirubin 07/02/2020 0.6  0.2 - 1.3 mg/dL Final   • eGFR Non African Amer 07/02/2020 55  39 - 90 mL/min/1.73 Final   • Globulin 07/02/2020 3.0  2.3 - 3.5 gm/dL Final   • A/G Ratio 07/02/2020 1.4  1.1 - 1.8 g/dL Final   • BUN/Creatinine Ratio 07/02/2020 24.0  7.0 - 25.0 Final   • Anion Gap 07/02/2020 8.0  5.0 - 15.0 mmol/L Final   • TSH 07/02/2020 3.650  0.270 - 4.200 uIU/mL Final   • Ferritin 07/02/2020 58.70  13.00 - 150.00 ng/mL Final   • Iron 07/02/2020 128  37 - 145 mcg/dL Final   • Iron Saturation 07/02/2020 35  20 - 50 % Final   • Transferrin 07/02/2020 245  200 - 360 mg/dL Final   • TIBC 07/02/2020 365  298 - 536 mcg/dL Final   • Vitamin B-12 07/02/2020 >2,000* 211 - 946 pg/mL Final   • Total Cholesterol 07/02/2020 192  150 - 200 mg/dL Final   • Triglycerides 07/02/2020 128  <=150 mg/dL Final   • HDL Cholesterol 07/02/2020 87* 40 - 59 mg/dL Final   • LDL Cholesterol  07/02/2020 79  <=100 mg/dL Final   • VLDL Cholesterol 07/02/2020 25.6  mg/dL Final   • LDL/HDL Ratio 07/02/2020 0.91  0.00 - 3.22 Final   • 25 Hydroxy, Vitamin D 07/02/2020 63.6  30.0 - 100.0 ng/ml Final   Orders Only on 07/01/2020   Component Date Value Ref Range Status   • Color, UA 07/02/2020 Yellow  Yellow, Straw Final   • Appearance, UA 07/02/2020 Slightly Cloudy* Clear Final   • pH, UA 07/02/2020 5.5  5.5 - 8.0 Final   • Specific Gravity, UA 07/02/2020 1.020  1.005 - 1.030 Final   •  Glucose, UA 07/02/2020 Negative  Negative Final   • Ketones, UA 07/02/2020 Negative  Negative Final   • Bilirubin, UA 07/02/2020 Negative  Negative Final   • Blood, UA 07/02/2020 Negative  Negative Final   • Protein, UA 07/02/2020 Negative  Negative Final   • Leuk Esterase, UA 07/02/2020 Small (1+)* Negative Final   • Nitrite, UA 07/02/2020 Positive* Negative Final   • Urobilinogen, UA 07/02/2020 0.2 E.U./dL  0.2 - 1.0 E.U./dL Final   • RBC, UA 07/02/2020 None Seen  None Seen /HPF Final   • WBC, UA 07/02/2020 13-20* None Seen /HPF Final   • Bacteria, UA 07/02/2020 4+* None Seen /HPF Final   • Squamous Epithelial Cells, UA 07/02/2020 7-12* None Seen, 0-2 /HPF Final   • Hyaline Casts, UA 07/02/2020 None Seen  None Seen /LPF Final   • Methodology 07/02/2020 Manual Light Microscopy   Final   • Urine Culture 07/02/2020 50,000 CFU/mL Escherichia coli*  Final   ]

## 2020-07-14 ENCOUNTER — CLINICAL SUPPORT (OUTPATIENT)
Dept: FAMILY MEDICINE CLINIC | Facility: CLINIC | Age: 85
End: 2020-07-14

## 2020-07-14 DIAGNOSIS — Z23 NEED FOR 23-POLYVALENT PNEUMOCOCCAL POLYSACCHARIDE VACCINE: Primary | ICD-10-CM

## 2020-07-14 PROCEDURE — G0009 ADMIN PNEUMOCOCCAL VACCINE: HCPCS | Performed by: INTERNAL MEDICINE

## 2020-07-14 PROCEDURE — 90732 PPSV23 VACC 2 YRS+ SUBQ/IM: CPT | Performed by: INTERNAL MEDICINE

## 2020-07-27 ENCOUNTER — OFFICE VISIT (OUTPATIENT)
Dept: FAMILY MEDICINE CLINIC | Facility: CLINIC | Age: 85
End: 2020-07-27

## 2020-07-27 VITALS
DIASTOLIC BLOOD PRESSURE: 72 MMHG | SYSTOLIC BLOOD PRESSURE: 144 MMHG | HEIGHT: 63 IN | BODY MASS INDEX: 23.57 KG/M2 | HEART RATE: 62 BPM | WEIGHT: 133 LBS

## 2020-07-27 DIAGNOSIS — R35.0 URINARY FREQUENCY: ICD-10-CM

## 2020-07-27 DIAGNOSIS — G47.9 SLEEP DISORDER: Chronic | ICD-10-CM

## 2020-07-27 DIAGNOSIS — F41.9 ANXIETY: ICD-10-CM

## 2020-07-27 DIAGNOSIS — I10 ESSENTIAL HYPERTENSION: Primary | Chronic | ICD-10-CM

## 2020-07-27 PROCEDURE — G2025 DIS SITE TELE SVCS RHC/FQHC: HCPCS | Performed by: INTERNAL MEDICINE

## 2020-07-27 RX ORDER — HYDRALAZINE HYDROCHLORIDE 25 MG/1
TABLET, FILM COATED ORAL
Qty: 150 TABLET | Refills: 11 | Status: SHIPPED | OUTPATIENT
Start: 2020-07-27 | End: 2021-01-13 | Stop reason: SDUPTHER

## 2020-07-27 NOTE — PROGRESS NOTES
Subjective      You have chosen to receive care through a telephone visit. Do you consent to use a telephone visit for your medical care today? Yes  Total visit time: 13 minutes.      History of Present Illness     Neisha Duran is a 85 y.o. female with multiple chronic medical issues who receives care today via telephone visit reporting poor blood pressure control, especially at night, with the current medications.  She has a normal baseline range of 140-170, although, she reports nighttime systolic BP has been ranging from 185 to 209 over the past six days.  She is dosing 50 mg of hydralazine when she wakes with the elevated BP.  She reports she wakes feeling hot and shaky and when she checks BP, it is normally well above goal.  Her daughter checked BP just a few minutes prior to visit and reports BP was 144/72.  I considered starting something for anxiety and to help her relax at night, although, her daughter is reluctant as she feels it may result in falls when she gets up at night.  She has recurrent UTIs and is reporting urinary urgency and frequency.  We will have her come in to check urinalysis.  If we rule out UTI, and these issues persist, I  would like to consider adding a low dose of Seroquel at night.  She has been on relatively low-dose Effexor XR for many years.             Review of Systems   Constitutional: Negative for chills, fatigue and fever.   HENT: Negative for congestion, ear pain, postnasal drip, sinus pressure and sore throat.    Respiratory: Negative for cough, shortness of breath and wheezing.    Cardiovascular: Negative for chest pain, palpitations and leg swelling.   Gastrointestinal: Negative for abdominal pain, blood in stool, constipation, diarrhea, nausea and vomiting.   Endocrine: Negative for cold intolerance, heat intolerance, polydipsia and polyuria.   Genitourinary: Positive for frequency and urgency. Negative for dysuria and hematuria.   Skin: Negative for rash.  "  Neurological: Negative for syncope and weakness.        Objective     Visit Vitals  /72   Pulse 62   Ht 160 cm (63\")   Wt 60.3 kg (133 lb)   BMI 23.56 kg/m²     Physical Exam    Future Appointments   Date Time Provider Department Center   1/15/2021  3:00 PM Keith Causey MD [facility] None       Assessment/Plan      For the elevated blood pressure, we will increase the hydralazine 25mg to take 1 tablet in a.m. and 3 tablets in the evening.  She may also use an additional tablet as needed for elevated blood pressure when waking at night.  She will stop by the lab in the next couple of days for urinalysis to make sure acute UTI is not playing a role in her elevated BP or other current symptoms.  I considered starting medication for anxiety, although, her daughter feels this may cause excessive sedation and result in increasing fall risk, which is a very legitimate concern.   If the current apparent nighttime anxiety does not improve with improvement in sleep, I would like to consider adding a low dose of Seroquel at night.  She has been on relatively low-dose Effexor XR for many years.       Return in January for routine follow up with fasting labs one week prior or sooner if needed.     Scribed for Dr. Causey by Erin Cavazos [facility].     Diagnoses and all orders for this visit:    Essential hypertension    Urinary frequency  -     Urinalysis With Culture If Indicated - Urine, Clean Catch; Future    Sleep disorder    Anxiety    Other orders  -     hydrALAZINE (APRESOLINE) 25 MG tablet; 1 tablet in a.m. and 3 tablets in the evening.  May also use an additional tablet as needed for elevated blood pressure.        No visits with results within 3 Week(s) from this visit.   Latest known visit with results is:   Lab on 07/02/2020   Component Date Value Ref Range Status   • Phenytoin Level 07/02/2020 16.1  10.0 - 20.0 mcg/mL Final   • WBC 07/02/2020 5.36  3.40 - 10.80 10*3/mm3 Final   • RBC 07/02/2020 3.93  3.77 " - 5.28 10*6/mm3 Final   • Hemoglobin 07/02/2020 12.9  12.0 - 15.9 g/dL Final   • Hematocrit 07/02/2020 37.9  34.0 - 46.6 % Final   • MCV 07/02/2020 96.4  79.0 - 97.0 fL Final   • MCH 07/02/2020 32.8  26.6 - 33.0 pg Final   • MCHC 07/02/2020 34.0  31.5 - 35.7 g/dL Final   • RDW 07/02/2020 12.3  12.3 - 15.4 % Final   • RDW-SD 07/02/2020 41.9  37.0 - 54.0 fl Final   • MPV 07/02/2020 9.6  6.0 - 12.0 fL Final   • Platelets 07/02/2020 224  140 - 450 10*3/mm3 Final   • Neutrophil % 07/02/2020 64.1  42.7 - 76.0 % Final   • Lymphocyte % 07/02/2020 23.1  19.6 - 45.3 % Final   • Monocyte % 07/02/2020 9.3  5.0 - 12.0 % Final   • Eosinophil % 07/02/2020 2.8  0.3 - 6.2 % Final   • Basophil % 07/02/2020 0.7  0.0 - 1.5 % Final   • Neutrophils, Absolute 07/02/2020 3.43  1.70 - 7.00 10*3/mm3 Final   • Lymphocytes, Absolute 07/02/2020 1.24  0.70 - 3.10 10*3/mm3 Final   • Monocytes, Absolute 07/02/2020 0.50  0.10 - 0.90 10*3/mm3 Final   • Eosinophils, Absolute 07/02/2020 0.15  0.00 - 0.40 10*3/mm3 Final   • Basophils, Absolute 07/02/2020 0.04  0.00 - 0.20 10*3/mm3 Final   • Glucose 07/02/2020 104* 70 - 99 mg/dL Final   • BUN 07/02/2020 23  7 - 23 mg/dL Final   • Creatinine 07/02/2020 0.96  0.52 - 1.04 mg/dL Final   • Sodium 07/02/2020 137  137 - 145 mmol/L Final   • Potassium 07/02/2020 4.9  3.4 - 5.0 mmol/L Final   • Chloride 07/02/2020 105  101 - 112 mmol/L Final   • CO2 07/02/2020 24.0  22.0 - 30.0 mmol/L Final   • Calcium 07/02/2020 9.7  8.4 - 10.2 mg/dL Final   • Total Protein 07/02/2020 7.2  6.3 - 8.6 g/dL Final   • Albumin 07/02/2020 4.20  3.50 - 5.00 g/dL Final   • ALT (SGPT) 07/02/2020 11  <=35 U/L Final   • AST (SGOT) 07/02/2020 23  14 - 36 U/L Final   • Alkaline Phosphatase 07/02/2020 42  38 - 126 U/L Final   • Total Bilirubin 07/02/2020 0.6  0.2 - 1.3 mg/dL Final   • eGFR Non African Amer 07/02/2020 55  39 - 90 mL/min/1.73 Final   • Globulin 07/02/2020 3.0  2.3 - 3.5 gm/dL Final   • A/G Ratio 07/02/2020 1.4  1.1 - 1.8 g/dL  Final   • BUN/Creatinine Ratio 07/02/2020 24.0  7.0 - 25.0 Final   • Anion Gap 07/02/2020 8.0  5.0 - 15.0 mmol/L Final   • TSH 07/02/2020 3.650  0.270 - 4.200 uIU/mL Final   • Ferritin 07/02/2020 58.70  13.00 - 150.00 ng/mL Final   • Iron 07/02/2020 128  37 - 145 mcg/dL Final   • Iron Saturation 07/02/2020 35  20 - 50 % Final   • Transferrin 07/02/2020 245  200 - 360 mg/dL Final   • TIBC 07/02/2020 365  298 - 536 mcg/dL Final   • Vitamin B-12 07/02/2020 >2,000* 211 - 946 pg/mL Final   • Total Cholesterol 07/02/2020 192  150 - 200 mg/dL Final   • Triglycerides 07/02/2020 128  <=150 mg/dL Final   • HDL Cholesterol 07/02/2020 87* 40 - 59 mg/dL Final   • LDL Cholesterol  07/02/2020 79  <=100 mg/dL Final   • VLDL Cholesterol 07/02/2020 25.6  mg/dL Final   • LDL/HDL Ratio 07/02/2020 0.91  0.00 - 3.22 Final   • 25 Hydroxy, Vitamin D 07/02/2020 63.6  30.0 - 100.0 ng/ml Final   ]

## 2020-07-29 ENCOUNTER — TELEPHONE (OUTPATIENT)
Dept: FAMILY MEDICINE CLINIC | Facility: CLINIC | Age: 85
End: 2020-07-29

## 2020-07-29 ENCOUNTER — LAB (OUTPATIENT)
Dept: LAB | Facility: OTHER | Age: 85
End: 2020-07-29

## 2020-07-29 DIAGNOSIS — R35.0 URINARY FREQUENCY: ICD-10-CM

## 2020-07-29 LAB
BACTERIA UR QL AUTO: ABNORMAL /HPF
BILIRUB UR QL STRIP: NEGATIVE
CLARITY UR: ABNORMAL
COLOR UR: YELLOW
GLUCOSE UR STRIP-MCNC: NEGATIVE MG/DL
HGB UR QL STRIP.AUTO: NEGATIVE
HYALINE CASTS UR QL AUTO: ABNORMAL /LPF
KETONES UR QL STRIP: NEGATIVE
LEUKOCYTE ESTERASE UR QL STRIP.AUTO: ABNORMAL
NITRITE UR QL STRIP: NEGATIVE
PH UR STRIP.AUTO: 5.5 [PH] (ref 5.5–8)
PROT UR QL STRIP: NEGATIVE
RBC # UR: ABNORMAL /HPF
REF LAB TEST METHOD: ABNORMAL
RENAL EPI CELLS #/AREA URNS HPF: ABNORMAL /HPF
SP GR UR STRIP: 1.01 (ref 1–1.03)
SQUAMOUS #/AREA URNS HPF: ABNORMAL /HPF
UROBILINOGEN UR QL STRIP: ABNORMAL
WBC UR QL AUTO: ABNORMAL /HPF

## 2020-07-29 PROCEDURE — 81001 URINALYSIS AUTO W/SCOPE: CPT | Performed by: INTERNAL MEDICINE

## 2020-07-29 RX ORDER — TRIMETHOPRIM 100 MG/1
100 TABLET ORAL DAILY
Qty: 14 TABLET | Refills: 0 | Status: SHIPPED | OUTPATIENT
Start: 2020-07-29 | End: 2020-08-12

## 2020-07-29 NOTE — TELEPHONE ENCOUNTER
07/29/2020 relayed results to daughter and meds sent. TP      ----- Message from Keith Causey MD sent at 7/29/2020  8:09 AM CDT -----  Inform Vangie that the urinalysis looks much better, but not completely clear.  Trimethoprim 100 mg daily for 2 weeks.  EB

## 2020-09-14 ENCOUNTER — LAB (OUTPATIENT)
Dept: LAB | Facility: OTHER | Age: 85
End: 2020-09-14

## 2020-09-14 DIAGNOSIS — R41.82 ALTERED MENTAL STATUS, UNSPECIFIED ALTERED MENTAL STATUS TYPE: Primary | ICD-10-CM

## 2020-09-14 DIAGNOSIS — R41.82 ALTERED MENTAL STATUS, UNSPECIFIED ALTERED MENTAL STATUS TYPE: ICD-10-CM

## 2020-09-14 LAB
BACTERIA UR QL AUTO: ABNORMAL /HPF
BILIRUB UR QL STRIP: NEGATIVE
CLARITY UR: ABNORMAL
COLOR UR: YELLOW
GLUCOSE UR STRIP-MCNC: NEGATIVE MG/DL
HGB UR QL STRIP.AUTO: NEGATIVE
HYALINE CASTS UR QL AUTO: ABNORMAL /LPF
KETONES UR QL STRIP: NEGATIVE
LEUKOCYTE ESTERASE UR QL STRIP.AUTO: ABNORMAL
NITRITE UR QL STRIP: NEGATIVE
PH UR STRIP.AUTO: 5.5 [PH] (ref 5.5–8)
PROT UR QL STRIP: NEGATIVE
RBC # UR: ABNORMAL /HPF
REF LAB TEST METHOD: ABNORMAL
RENAL EPI CELLS #/AREA URNS HPF: ABNORMAL /HPF
SP GR UR STRIP: 1.02 (ref 1–1.03)
SQUAMOUS #/AREA URNS HPF: ABNORMAL /HPF
UROBILINOGEN UR QL STRIP: ABNORMAL
WBC UR QL AUTO: ABNORMAL /HPF

## 2020-09-14 PROCEDURE — 81001 URINALYSIS AUTO W/SCOPE: CPT | Performed by: INTERNAL MEDICINE

## 2020-09-14 RX ORDER — CEFUROXIME AXETIL 250 MG/1
250 TABLET ORAL 2 TIMES DAILY
Qty: 14 TABLET | Refills: 0 | Status: SHIPPED | OUTPATIENT
Start: 2020-09-14 | End: 2020-09-21

## 2020-09-14 RX ORDER — TRIMETHOPRIM 100 MG/1
100 TABLET ORAL DAILY
Qty: 15 TABLET | Refills: 0 | Status: SHIPPED | OUTPATIENT
Start: 2020-09-14 | End: 2020-10-27 | Stop reason: SDUPTHER

## 2020-10-21 DIAGNOSIS — R41.82 ALTERED MENTAL STATUS, UNSPECIFIED ALTERED MENTAL STATUS TYPE: Primary | ICD-10-CM

## 2020-10-21 DIAGNOSIS — R35.0 URINARY FREQUENCY: ICD-10-CM

## 2020-10-22 RX ORDER — PANTOPRAZOLE SODIUM 40 MG/1
40 TABLET, DELAYED RELEASE ORAL 2 TIMES DAILY
Qty: 60 TABLET | Refills: 11 | Status: SHIPPED | OUTPATIENT
Start: 2020-10-22 | End: 2021-10-21

## 2020-10-26 ENCOUNTER — LAB (OUTPATIENT)
Dept: LAB | Facility: OTHER | Age: 85
End: 2020-10-26

## 2020-10-26 DIAGNOSIS — R41.82 ALTERED MENTAL STATUS, UNSPECIFIED ALTERED MENTAL STATUS TYPE: ICD-10-CM

## 2020-10-26 DIAGNOSIS — R35.0 URINARY FREQUENCY: ICD-10-CM

## 2020-10-26 LAB
BACTERIA UR QL AUTO: ABNORMAL /HPF
BILIRUB UR QL STRIP: NEGATIVE
CLARITY UR: ABNORMAL
COLOR UR: YELLOW
GLUCOSE UR STRIP-MCNC: NEGATIVE MG/DL
HGB UR QL STRIP.AUTO: NEGATIVE
HYALINE CASTS UR QL AUTO: ABNORMAL /LPF
KETONES UR QL STRIP: NEGATIVE
LEUKOCYTE ESTERASE UR QL STRIP.AUTO: ABNORMAL
MUCOUS THREADS URNS QL MICRO: ABNORMAL /HPF
NITRITE UR QL STRIP: NEGATIVE
PH UR STRIP.AUTO: 5.5 [PH] (ref 5.5–8)
PROT UR QL STRIP: NEGATIVE
RBC # UR: ABNORMAL /HPF
REF LAB TEST METHOD: ABNORMAL
RENAL EPI CELLS #/AREA URNS HPF: ABNORMAL /HPF
SP GR UR STRIP: 1.02 (ref 1–1.03)
SQUAMOUS #/AREA URNS HPF: ABNORMAL /HPF
UROBILINOGEN UR QL STRIP: ABNORMAL
WBC UR QL AUTO: ABNORMAL /HPF

## 2020-10-26 PROCEDURE — 81001 URINALYSIS AUTO W/SCOPE: CPT | Performed by: INTERNAL MEDICINE

## 2020-10-26 PROCEDURE — 87086 URINE CULTURE/COLONY COUNT: CPT | Performed by: INTERNAL MEDICINE

## 2020-10-27 ENCOUNTER — OFFICE VISIT (OUTPATIENT)
Dept: FAMILY MEDICINE CLINIC | Facility: CLINIC | Age: 85
End: 2020-10-27

## 2020-10-27 VITALS
HEIGHT: 63 IN | HEART RATE: 57 BPM | WEIGHT: 133 LBS | DIASTOLIC BLOOD PRESSURE: 68 MMHG | SYSTOLIC BLOOD PRESSURE: 181 MMHG | BODY MASS INDEX: 23.57 KG/M2

## 2020-10-27 DIAGNOSIS — R09.89 LABILE HYPERTENSION: ICD-10-CM

## 2020-10-27 DIAGNOSIS — R41.82 ALTERED MENTAL STATUS, UNSPECIFIED ALTERED MENTAL STATUS TYPE: ICD-10-CM

## 2020-10-27 DIAGNOSIS — N30.00 ACUTE CYSTITIS WITHOUT HEMATURIA: Primary | ICD-10-CM

## 2020-10-27 DIAGNOSIS — G47.9 SLEEP DISORDER: ICD-10-CM

## 2020-10-27 DIAGNOSIS — N39.0 RECURRENT URINARY TRACT INFECTION: ICD-10-CM

## 2020-10-27 DIAGNOSIS — I10 ESSENTIAL HYPERTENSION: ICD-10-CM

## 2020-10-27 LAB — BACTERIA SPEC AEROBE CULT: NORMAL

## 2020-10-27 PROCEDURE — G2025 DIS SITE TELE SVCS RHC/FQHC: HCPCS | Performed by: INTERNAL MEDICINE

## 2020-10-27 RX ORDER — CEFUROXIME AXETIL 250 MG/1
250 TABLET ORAL 2 TIMES DAILY
Qty: 14 TABLET | Refills: 0 | Status: SHIPPED | OUTPATIENT
Start: 2020-10-27 | End: 2020-11-03

## 2020-10-27 RX ORDER — TRIMETHOPRIM 100 MG/1
100 TABLET ORAL DAILY
Qty: 30 TABLET | Refills: 6 | Status: SHIPPED | OUTPATIENT
Start: 2020-10-27 | End: 2021-04-12

## 2020-10-27 NOTE — PROGRESS NOTES
Subjective      You have chosen to receive care through a telephone visit. Do you consent to use a telephone visit for your medical care today? Yes    Total visit time:  15 minutes.      History of Present Illness     Neisha Duran is a 85 y.o. female with history of recurrent UTIs who reported frequent urination, both daytime and nighttime, and dysuria.  Symptoms started 1 week ago, then slightly improved at the end of last week, but returned over the weekend.  Her daughter was noticing some mental status changes.  Urinalysis completed yesterday revealed 13-20 WBCs and 1+ bacteria.  We treated UTI in September with 1 week of Ceftin followed by daily trimethoprim for 2 weeks, which patient completed approximately three weeks ago. Culture grew E-coli in July.       Patient's BP is above goal today at 181/68.  She has a history of poor blood pressure control with normal baseline range of 140-170.  Her daughter reports her BP tends to run higher than normal when her sleep is disrupted by being up multiple times through the night due to frequent urination associated with the UTI.  Prior to the current UTI, BP had been running much better, near her baseline.  She is on aggressive blood pressure medication, and we are limited to do more due to medication side effects.  See prior notes.      Review of Systems   Constitutional: Negative for chills, fatigue and fever.   HENT: Negative for congestion, ear pain, postnasal drip, sinus pressure and sore throat.    Respiratory: Negative for cough, shortness of breath and wheezing.    Cardiovascular: Negative for chest pain, palpitations and leg swelling.   Gastrointestinal: Negative for abdominal pain, blood in stool, constipation, diarrhea, nausea and vomiting.   Endocrine: Negative for cold intolerance, heat intolerance, polydipsia and polyuria.   Genitourinary: Positive for dysuria and frequency. Negative for hematuria and urgency.   Skin: Negative for rash.   Neurological:  Negative for syncope and weakness.        Objective       Physical Exam    Future Appointments   Date Time Provider Department Center   1/15/2021  3:00 PM Keith Causey MD [facility] None       Assessment/Plan      To treat the acute cystitis, I sent a prescription for Ceftin twice daily X 7 days, followed by daily trimethoprim for prophylaxis to hopefully break the cycle of the frequent UTIs.  I recommend she take a probiotic, such as Florastor.    Continue current BP medications as prescribed.  Monitor BP at rest and notify me if not consistently at goal.      Return in January for routine follow up with fasting labs one week prior or sooner if needed.      Scribed for Dr. Causey by Erin Cavazos [facility].     Diagnoses and all orders for this visit:    Acute cystitis without hematuria    Recurrent urinary tract infection    Labile hypertension    Essential hypertension    Altered mental status, unspecified altered mental status type    Sleep disorder    Other orders  -     cefuroxime (CEFTIN) 250 MG tablet; Take 1 tablet by mouth 2 (Two) Times a Day for 7 days.  -     trimethoprim (TRIMPEX) 100 MG tablet; Take 1 tablet by mouth Daily.        Lab on 10/26/2020   Component Date Value Ref Range Status   • Color, UA 10/26/2020 Yellow  Yellow, Straw Final   • Appearance, UA 10/26/2020 Cloudy* Clear Final   • pH, UA 10/26/2020 5.5  5.5 - 8.0 Final   • Specific Gravity, UA 10/26/2020 1.020  1.005 - 1.030 Final   • Glucose, UA 10/26/2020 Negative  Negative Final   • Ketones, UA 10/26/2020 Negative  Negative Final   • Bilirubin, UA 10/26/2020 Negative  Negative Final   • Blood, UA 10/26/2020 Negative  Negative Final   • Protein, UA 10/26/2020 Negative  Negative Final   • Leuk Esterase, UA 10/26/2020 Small (1+)* Negative Final   • Nitrite, UA 10/26/2020 Negative  Negative Final   • Urobilinogen, UA 10/26/2020 0.2 E.U./dL  0.2 - 1.0 E.U./dL Final   • RBC, UA 10/26/2020 0-2* None Seen /HPF Final   • WBC, UA 10/26/2020  13-20* None Seen /HPF Final   • Bacteria, UA 10/26/2020 1+* None Seen /HPF Final   • Squamous Epithelial Cells, UA 10/26/2020 3-6* None Seen, 0-2 /HPF Final   • Renal Epithelial Cells, UA 10/26/2020 0-2  0 - 2 /HPF Final   • Hyaline Casts, UA 10/26/2020 None Seen  None Seen /LPF Final   • Mucus, UA 10/26/2020 Trace  None Seen, Trace /HPF Final   • Methodology 10/26/2020 Manual Light Microscopy   Final   • Urine Culture 10/26/2020 <25,000 CFU/mL Mixed Jo Isolated   Final   ]

## 2020-12-18 RX ORDER — LEVETIRACETAM 500 MG/1
TABLET, EXTENDED RELEASE ORAL
Qty: 180 TABLET | Refills: 11 | Status: SHIPPED | OUTPATIENT
Start: 2020-12-18 | End: 2021-12-17 | Stop reason: SDUPTHER

## 2020-12-28 RX ORDER — CLONIDINE 0.2 MG/24H
1 PATCH, EXTENDED RELEASE TRANSDERMAL WEEKLY
Qty: 4 PATCH | Refills: 11 | Status: SHIPPED | OUTPATIENT
Start: 2020-12-28 | End: 2021-01-13 | Stop reason: DRUGHIGH

## 2021-01-07 ENCOUNTER — LAB (OUTPATIENT)
Dept: LAB | Facility: OTHER | Age: 86
End: 2021-01-07

## 2021-01-07 DIAGNOSIS — G40.909 SEIZURE DISORDER (HCC): Chronic | ICD-10-CM

## 2021-01-07 DIAGNOSIS — I10 ESSENTIAL HYPERTENSION: Chronic | ICD-10-CM

## 2021-01-07 DIAGNOSIS — E55.9 VITAMIN D DEFICIENCY: Chronic | ICD-10-CM

## 2021-01-07 DIAGNOSIS — D50.8 IRON DEFICIENCY ANEMIA SECONDARY TO INADEQUATE DIETARY IRON INTAKE: Chronic | ICD-10-CM

## 2021-01-07 DIAGNOSIS — D51.8 DIETARY VITAMIN B12 DEFICIENCY ANEMIA: Chronic | ICD-10-CM

## 2021-01-07 DIAGNOSIS — E78.2 MIXED HYPERLIPIDEMIA: Chronic | ICD-10-CM

## 2021-01-07 LAB
25(OH)D3 SERPL-MCNC: 60.1 NG/ML (ref 30–100)
ALBUMIN SERPL-MCNC: 4.1 G/DL (ref 3.5–5)
ALBUMIN/GLOB SERPL: 1.5 G/DL (ref 1.1–1.8)
ALP SERPL-CCNC: 45 U/L (ref 38–126)
ALT SERPL W P-5'-P-CCNC: 12 U/L
ANION GAP SERPL CALCULATED.3IONS-SCNC: 8 MMOL/L (ref 5–15)
ARTICHOKE IGE QN: 89 MG/DL (ref 0–100)
AST SERPL-CCNC: 24 U/L (ref 14–36)
BASOPHILS # BLD AUTO: 0.04 10*3/MM3 (ref 0–0.2)
BASOPHILS NFR BLD AUTO: 0.9 % (ref 0–1.5)
BILIRUB SERPL-MCNC: 0.5 MG/DL (ref 0.2–1.3)
BUN SERPL-MCNC: 15 MG/DL (ref 7–23)
BUN/CREAT SERPL: 16.7 (ref 7–25)
CALCIUM SPEC-SCNC: 9.5 MG/DL (ref 8.4–10.2)
CHLORIDE SERPL-SCNC: 105 MMOL/L (ref 101–112)
CO2 SERPL-SCNC: 26 MMOL/L (ref 22–30)
CREAT SERPL-MCNC: 0.9 MG/DL (ref 0.52–1.04)
DEPRECATED RDW RBC AUTO: 43.7 FL (ref 37–54)
EOSINOPHIL # BLD AUTO: 0.1 10*3/MM3 (ref 0–0.4)
EOSINOPHIL NFR BLD AUTO: 2.2 % (ref 0.3–6.2)
ERYTHROCYTE [DISTWIDTH] IN BLOOD BY AUTOMATED COUNT: 12.9 % (ref 12.3–15.4)
FERRITIN SERPL-MCNC: 61.3 NG/ML (ref 13–150)
GFR SERPL CREATININE-BSD FRML MDRD: 59 ML/MIN/1.73 (ref 39–90)
GLOBULIN UR ELPH-MCNC: 2.8 GM/DL (ref 2.3–3.5)
GLUCOSE SERPL-MCNC: 106 MG/DL (ref 70–99)
HCT VFR BLD AUTO: 38.2 % (ref 34–46.6)
HGB BLD-MCNC: 12.8 G/DL (ref 12–15.9)
LYMPHOCYTES # BLD AUTO: 1.06 10*3/MM3 (ref 0.7–3.1)
LYMPHOCYTES NFR BLD AUTO: 23.3 % (ref 19.6–45.3)
MCH RBC QN AUTO: 32.6 PG (ref 26.6–33)
MCHC RBC AUTO-ENTMCNC: 33.5 G/DL (ref 31.5–35.7)
MCV RBC AUTO: 97.2 FL (ref 79–97)
MONOCYTES # BLD AUTO: 0.5 10*3/MM3 (ref 0.1–0.9)
MONOCYTES NFR BLD AUTO: 11 % (ref 5–12)
NEUTROPHILS NFR BLD AUTO: 2.85 10*3/MM3 (ref 1.7–7)
NEUTROPHILS NFR BLD AUTO: 62.6 % (ref 42.7–76)
PHENYTOIN SERPL-MCNC: 14.2 MCG/ML (ref 10–20)
PLATELET # BLD AUTO: 225 10*3/MM3 (ref 140–450)
PMV BLD AUTO: 8.8 FL (ref 6–12)
POTASSIUM SERPL-SCNC: 4.6 MMOL/L (ref 3.4–5)
PROT SERPL-MCNC: 6.9 G/DL (ref 6.3–8.6)
RBC # BLD AUTO: 3.93 10*6/MM3 (ref 3.77–5.28)
SODIUM SERPL-SCNC: 139 MMOL/L (ref 137–145)
VIT B12 BLD-MCNC: >2000 PG/ML (ref 211–946)
WBC # BLD AUTO: 4.55 10*3/MM3 (ref 3.4–10.8)

## 2021-01-07 PROCEDURE — 36415 COLL VENOUS BLD VENIPUNCTURE: CPT | Performed by: INTERNAL MEDICINE

## 2021-01-07 PROCEDURE — 83721 ASSAY OF BLOOD LIPOPROTEIN: CPT | Performed by: INTERNAL MEDICINE

## 2021-01-07 PROCEDURE — 82306 VITAMIN D 25 HYDROXY: CPT | Performed by: INTERNAL MEDICINE

## 2021-01-07 PROCEDURE — 82728 ASSAY OF FERRITIN: CPT | Performed by: INTERNAL MEDICINE

## 2021-01-07 PROCEDURE — 80185 ASSAY OF PHENYTOIN TOTAL: CPT | Performed by: INTERNAL MEDICINE

## 2021-01-07 PROCEDURE — 82607 VITAMIN B-12: CPT | Performed by: INTERNAL MEDICINE

## 2021-01-07 PROCEDURE — 85025 COMPLETE CBC W/AUTO DIFF WBC: CPT | Performed by: INTERNAL MEDICINE

## 2021-01-07 PROCEDURE — 80053 COMPREHEN METABOLIC PANEL: CPT | Performed by: INTERNAL MEDICINE

## 2021-01-13 ENCOUNTER — OFFICE VISIT (OUTPATIENT)
Dept: FAMILY MEDICINE CLINIC | Facility: CLINIC | Age: 86
End: 2021-01-13

## 2021-01-13 VITALS
HEART RATE: 68 BPM | SYSTOLIC BLOOD PRESSURE: 191 MMHG | DIASTOLIC BLOOD PRESSURE: 59 MMHG | HEIGHT: 63 IN | WEIGHT: 133 LBS | BODY MASS INDEX: 23.57 KG/M2

## 2021-01-13 DIAGNOSIS — M81.0 AGE-RELATED OSTEOPOROSIS WITHOUT CURRENT PATHOLOGICAL FRACTURE: Chronic | ICD-10-CM

## 2021-01-13 DIAGNOSIS — G47.9 SLEEP DISORDER: Chronic | ICD-10-CM

## 2021-01-13 DIAGNOSIS — F33.41 RECURRENT MAJOR DEPRESSIVE DISORDER, IN PARTIAL REMISSION (HCC): Chronic | ICD-10-CM

## 2021-01-13 DIAGNOSIS — I10 ESSENTIAL HYPERTENSION: Primary | Chronic | ICD-10-CM

## 2021-01-13 DIAGNOSIS — E78.2 MIXED HYPERLIPIDEMIA: Chronic | ICD-10-CM

## 2021-01-13 DIAGNOSIS — D51.8 DIETARY VITAMIN B12 DEFICIENCY ANEMIA: Chronic | ICD-10-CM

## 2021-01-13 DIAGNOSIS — R09.89 LABILE HYPERTENSION: Chronic | ICD-10-CM

## 2021-01-13 DIAGNOSIS — I47.29 PAROXYSMAL VENTRICULAR TACHYCARDIA (HCC): Chronic | ICD-10-CM

## 2021-01-13 DIAGNOSIS — I50.22 CHRONIC SYSTOLIC CONGESTIVE HEART FAILURE (HCC): Chronic | ICD-10-CM

## 2021-01-13 DIAGNOSIS — I25.5 ISCHEMIC CARDIOMYOPATHY: Chronic | ICD-10-CM

## 2021-01-13 DIAGNOSIS — J43.1 PANLOBULAR EMPHYSEMA (HCC): Chronic | ICD-10-CM

## 2021-01-13 DIAGNOSIS — G40.909 SEIZURE DISORDER (HCC): Chronic | ICD-10-CM

## 2021-01-13 DIAGNOSIS — H53.462 LEFT HOMONYMOUS HEMIANOPSIA: Chronic | ICD-10-CM

## 2021-01-13 DIAGNOSIS — D50.8 IRON DEFICIENCY ANEMIA SECONDARY TO INADEQUATE DIETARY IRON INTAKE: Chronic | ICD-10-CM

## 2021-01-13 DIAGNOSIS — E55.9 VITAMIN D DEFICIENCY: Chronic | ICD-10-CM

## 2021-01-13 DIAGNOSIS — N39.0 RECURRENT UTI (URINARY TRACT INFECTION): Chronic | ICD-10-CM

## 2021-01-13 PROCEDURE — G2025 DIS SITE TELE SVCS RHC/FQHC: HCPCS | Performed by: INTERNAL MEDICINE

## 2021-01-13 RX ORDER — CLONIDINE 0.3 MG/24H
1 PATCH, EXTENDED RELEASE TRANSDERMAL WEEKLY
Qty: 12 PATCH | Refills: 3 | Status: SHIPPED | OUTPATIENT
Start: 2021-01-13 | End: 2021-08-17 | Stop reason: ALTCHOICE

## 2021-01-13 RX ORDER — HYDRALAZINE HYDROCHLORIDE 50 MG/1
TABLET, FILM COATED ORAL
Qty: 270 TABLET | Refills: 3 | Status: SHIPPED | OUTPATIENT
Start: 2021-01-13 | End: 2021-06-14 | Stop reason: SDUPTHER

## 2021-01-13 NOTE — PROGRESS NOTES
Chief Complaint  Follow-up (6 MONTH FU WITH LABS )    Subjective        You have chosen to receive care through a telephone visit. Do you consent to use a telephone visit for your medical care today? Yes  Total time: 35 minutes    Neisha Duran presents to [facility] for telephone visit for 6-month follow-up of multiple chronic medical problems  History of Present Illness    Neisha receives telephone visit for 6-month follow-up of multiple complex issues including extensive vascular disease, hyperlipidemia, labile, severe hypertension, systolic CHF/cardiomyopathy, carotid artery stenosis, COPD, and iron and vitamin B12 deficiency anemia among many other issues.  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  She continues on Dilantin and Keppra for seizure disorder and denies any recent seizure activity.    We follow her mild, stable carotid artery plaques approximately every 2 years.  She continues on simvastatin with good results.    Her blood pressure has been increasingly difficult to control.  It is quite labile.  She has a very wide pulse pressure due to her extensive vascular disease, so we have to be careful with her diastolic pressure as well.  Systolic pressure for the past week has been consistently are above goal.  She denies uncontrolled stress or anxiety.  She continues on Effexor XR.  She was having trouble sleeping at times last year, but reports that she has been sleeping better.    We have been treating her osteoporosis of the hip with Prolia injections every 6 months, with good results.  She tolerated bisphosphonates poorly.  She continues on calcium and vitamin D supplements.  Her next DEXA will be due 8/2021.  She is high risk for falls and fractures from falls.    Dr. Nuñez is her  "electrophysiologist at [place], and the continues to follow her, sometimes with telemedicine visits.  There have been no recent changes in her sotalol.    Her labs are all reviewed with the patient and her daughter.  Liver and renal functions are normal.  LDL is a bit higher than normal, currently 89.  Labs were drawn after the Christmas holidays.    Objective   Vital Signs:   BP (!) 191/59 Comment: checked right now  Pulse 68   Ht 160 cm (63\")   Wt 60.3 kg (133 lb)   BMI 23.56 kg/m²     Physical Exam   Result Review :     CMP    CMP 7/2/20 1/7/21   BUN 23 15   Creatinine 0.96 0.90   eGFR Non African Am 55 59   Sodium 137 139   Potassium 4.9 4.6   Chloride 105 105   Calcium 9.7 9.5   Albumin 4.20 4.10   Total Bilirubin 0.6 0.5   Alkaline Phosphatase 42 45   AST (SGOT) 23 24   ALT (SGPT) 11 12           CBC    CBC 7/2/20 1/7/21   WBC 5.36 4.55   RBC 3.93 3.93   Hemoglobin 12.9 12.8   Hematocrit 37.9 38.2   MCV 96.4 97.2 (A)   MCH 32.8 32.6   MCHC 34.0 33.5   RDW 12.3 12.9   Platelets 224 225   (A) Abnormal value            Lipid Panel    Lipid Panel 7/2/20 1/7/21   Triglycerides 128    HDL Cholesterol 87 (A)    VLDL Cholesterol 25.6    LDL Cholesterol  79 89   LDL/HDL Ratio 0.91    (A) Abnormal value            TSH    TSH 7/2/20   TSH 3.650           Data reviewed: Cardiology studies Electrophysiology results, but prior          Assessment and Plan    Problem List Items Addressed This Visit        Cardiac and Vasculature    Cardiomyopathy (CMS/HCC) (Chronic)    Overview     ischemia cardiomyopathy. Dr. Nuñez, [place]         Chronic systolic congestive heart failure (CMS/HCC) - EF 30% (Chronic)    Overview     EF 30%         Relevant Orders    BNP    TSH    Essential hypertension - Primary (Chronic)    Overview     on enalapril and sotalol         Relevant Medications    cloNIDine (CATAPRES-TTS) 0.3 MG/24HR patch    hydrALAZINE (APRESOLINE) 50 MG tablet    Other Relevant Orders    CBC Auto Differential    " Comprehensive Metabolic Panel    Mixed hyperlipidemia (Chronic)    Overview     on simvastatin         Relevant Orders    Lipid Panel    Paroxysmal ventricular tachycardia (CMS/HCC) (Chronic)       Endocrine and Metabolic    Vitamin D deficiency (Chronic)    Overview     On vitamin D 50,000 units monthly, Added Calcium/Vitamin D daily, 03/2015         Relevant Orders    Vitamin D 25 Hydroxy       Eye    Homonymous hemianopia - left (Chronic)    Overview     left            Genitourinary and Reproductive     Recurrent UTI (urinary tract infection) (Chronic)       Hematology and Neoplasia    Dietary vitamin B12 deficiency anemia (Chronic)    Relevant Orders    Vitamin B12    Iron deficiency anemia (Chronic)    Overview     Dx'd 4/2014. . on daily Niferex         Relevant Orders    Ferritin       Mental Health    Recurrent major depressive disorder, in partial remission (CMS/HCC) (Chronic)       Musculoskeletal and Injuries    Osteoporosis (Chronic)    Overview     Hip, per DEXA 3/2015. IV Reclast, 3/2015 produced bony pain and persistent fatigue. Started Prolia injections, 4/2016. Next DEXA due spring 2017.           Relevant Orders    Vitamin D 25 Hydroxy       Neuro    Seizure disorder (CMS/HCC) (Chronic)    Relevant Orders    Phenytoin Level, Total       Pulmonary and Pneumonias    Chronic obstructive lung disease (CMS/HCC) (Chronic)    Overview     Order PFTs and started albuterol and Atrovent nebs, 7/2016. Hospitalized with COPD exacerbation, 7/2016             Sleep    Sleep disorder (Chronic)       Symptoms and Signs    Labile hypertension (Chronic)    Relevant Medications    cloNIDine (CATAPRES-TTS) 0.3 MG/24HR patch    hydrALAZINE (APRESOLINE) 50 MG tablet    Other Relevant Orders    Comprehensive Metabolic Panel        Increase hydralazine to 50 mg in a.m. and 100 mg in p.m.  She may also still take an additional tablet of the hydralazine anytime systolic pressures greater than 170.  Increase Catapres patch  to 0.3 mg.  Change patch weekly.  Continue her other blood pressure medications and also continue the sotalol as recommended by Dr. Sahu due to her history of ventricular tachycardia.  Continue to monitor blood pressure at home and pursue sodium restriction.  Notify me if not at goal.  Due to her wide pulse pressure, I would be very pleased if we could just keep her systolic less than 160.    Continue simvastatin 40 mg nightly and dietary efforts.    Continue the Protonix for upper GI issues.    Continue the Keppra and Dilantin in this patient with seizure disorder.  She denies any seizure activity in the past several months.    Continue the iron supplement and vitamin B12 supplement, but decrease to 500 mcg twice weekly.  And continue to follow her B12 and iron levels.    Continue the Prolia injections every 6 months as well as the daily calcium and vitamin D supplements.  Fall precautions.  Next DEXA due 8/2021.    Continue to follow with Dr. Rivas, electrophysiologist.    Continue trimethoprim daily for prophylaxis due to her history of chronic/recurrent UTI.    Continue the Effexor XR.  I have frequently considered more medication for anxiety, but not sure if it would be beneficial.  Perhaps a low-dose of Seroquel or Remeron in the future.    Return to clinic in 6 months with fasting labs prior.  Include a BNP due to history of systolic CHF.  Cardiology has not ordered an echocardiogram lately, but this issue seems to be stable.  We can order an echocardiogram in the future if the need arises.      Follow Up   No follow-ups on file.  Patient was given instructions and counseling regarding her condition or for health maintenance advice. Please see specific information pulled into the AVS if appropriate.

## 2021-02-09 RX ORDER — OLMESARTAN MEDOXOMIL 40 MG/1
40 TABLET ORAL NIGHTLY
Qty: 90 TABLET | Refills: 3 | Status: SHIPPED | OUTPATIENT
Start: 2021-02-09 | End: 2021-02-09 | Stop reason: SDUPTHER

## 2021-02-09 RX ORDER — OLMESARTAN MEDOXOMIL 40 MG/1
40 TABLET ORAL NIGHTLY
Qty: 90 TABLET | Refills: 3 | Status: SHIPPED | OUTPATIENT
Start: 2021-02-09 | End: 2022-05-03

## 2021-02-09 RX ORDER — IRON POLYSACCHARIDE COMPLEX 150 MG
CAPSULE ORAL
Qty: 36 CAPSULE | Refills: 3 | Status: SHIPPED | OUTPATIENT
Start: 2021-02-09 | End: 2021-07-16 | Stop reason: SDUPTHER

## 2021-03-12 RX ORDER — CLOPIDOGREL BISULFATE 75 MG/1
75 TABLET ORAL DAILY
Qty: 30 TABLET | Refills: 11 | Status: SHIPPED | OUTPATIENT
Start: 2021-03-12 | End: 2021-03-15 | Stop reason: SDUPTHER

## 2021-03-15 RX ORDER — CLOPIDOGREL BISULFATE 75 MG/1
75 TABLET ORAL DAILY
Qty: 30 TABLET | Refills: 11 | Status: SHIPPED | OUTPATIENT
Start: 2021-03-15 | End: 2022-02-10

## 2021-04-12 RX ORDER — TRIMETHOPRIM 100 MG/1
TABLET ORAL
Qty: 30 TABLET | Refills: 6 | Status: SHIPPED | OUTPATIENT
Start: 2021-04-12 | End: 2021-07-16 | Stop reason: SDUPTHER

## 2021-04-12 RX ORDER — TRIMETHOPRIM 100 MG/1
100 TABLET ORAL DAILY
Qty: 30 TABLET | Refills: 11 | Status: SHIPPED | OUTPATIENT
Start: 2021-04-12 | End: 2021-09-08 | Stop reason: RX

## 2021-05-13 ENCOUNTER — OFFICE VISIT (OUTPATIENT)
Dept: FAMILY MEDICINE CLINIC | Facility: CLINIC | Age: 86
End: 2021-05-13

## 2021-05-13 VITALS — BODY MASS INDEX: 23.57 KG/M2 | WEIGHT: 133 LBS | HEIGHT: 63 IN

## 2021-05-13 DIAGNOSIS — Z23 NEED FOR SHINGLES VACCINE: ICD-10-CM

## 2021-05-13 DIAGNOSIS — Z23 NEED FOR DIPHTHERIA-TETANUS-PERTUSSIS (TDAP) VACCINE: ICD-10-CM

## 2021-05-13 DIAGNOSIS — Z00.00 MEDICARE ANNUAL WELLNESS VISIT, INITIAL: Primary | ICD-10-CM

## 2021-05-13 PROCEDURE — G0438 PPPS, INITIAL VISIT: HCPCS | Performed by: INTERNAL MEDICINE

## 2021-05-13 NOTE — PROGRESS NOTES
The ABCs of the Annual Wellness Visit  Initial Medicare Wellness Visit    Chief Complaint   Patient presents with   • Medicare Wellness-Initial Visit       Subjective   History of Present Illness:  Neisha Duran is a 86 y.o. female who presents for an Initial Medicare Wellness Visit.    HEALTH RISK ASSESSMENT    Recent Hospitalizations:  No hospitalization(s) within the last year.    Current Medical Providers:  Patient Care Team:  Keith Causey MD as PCP - General (Internal Medicine)    Smoking Status:  Social History     Tobacco Use   Smoking Status Former Smoker   Smokeless Tobacco Never Used       Alcohol Consumption:  Social History     Substance and Sexual Activity   Alcohol Use No       Depression Screen:   PHQ-2/PHQ-9 Depression Screening 5/13/2021   Little interest or pleasure in doing things 0   Feeling down, depressed, or hopeless 0   Trouble falling or staying asleep, or sleeping too much -   Feeling tired or having little energy -   Poor appetite or overeating -   Feeling bad about yourself - or that you are a failure or have let yourself or your family down -   Trouble concentrating on things, such as reading the newspaper or watching television -   Moving or speaking so slowly that other people could have noticed. Or the opposite - being so fidgety or restless that you have been moving around a lot more than usual -   Thoughts that you would be better off dead, or of hurting yourself in some way -   Total Score 0   If you checked off any problems, how difficult have these problems made it for you to do your work, take care of things at home, or get along with other people? -       Fall Risk Screen:  STEADI Fall Risk Assessment was completed, and patient is at MODERATE risk for falls. Assessment completed on:5/13/2021    Health Habits and Functional and Cognitive Screening:  Functional & Cognitive Status 5/13/2021   Do you have difficulty preparing food and eating? No   Do you have difficulty bathing  yourself, getting dressed or grooming yourself? No   Do you have difficulty using the toilet? No   Do you have difficulty moving around from place to place? No   Do you have trouble with steps or getting out of a bed or a chair? Yes   Current Diet Well Balanced Diet   Dental Exam Not up to date   Eye Exam Not up to date   Exercise (times per week) 7 times per week   Current Exercise Activities Include Walking   Do you need help using the phone?  No   Are you deaf or do you have serious difficulty hearing?  No   Do you need help with transportation? No   Do you need help shopping? No   Do you need help preparing meals?  No   Do you need help with housework?  No   Do you need help with laundry? No   Do you need help taking your medications? No   Do you need help managing money? No   Do you ever drive or ride in a car without wearing a seat belt? No   Have you felt unusual stress, anger or loneliness in the last month? No   Who do you live with? Alone   If you need help, do you have trouble finding someone available to you? No   Have you been bothered in the last four weeks by sexual problems? No   Do you have difficulty concentrating, remembering or making decisions? Yes         Does the patient have evidence of cognitive impairment? No    Asprin use counseling:On clopidrogel as an alternative (due to ASA contraindication)    Age-appropriate Screening Schedule:  Refer to the list below for future screening recommendations based on patient's age, sex and/or medical conditions. Orders for these recommended tests are listed in the plan section. The patient has been provided with a written plan.    Health Maintenance   Topic Date Due   • TDAP/TD VACCINES (1 - Tdap) Never done   • ZOSTER VACCINE (1 of 2) Never done   • INFLUENZA VACCINE  08/01/2021   • DXA SCAN  08/06/2021   • LIPID PANEL  01/07/2022          The following portions of the patient's history were reviewed and updated as appropriate: allergies, current  medications, past family history, past medical history, past social history, past surgical history and problem list.    Outpatient Medications Prior to Visit   Medication Sig Dispense Refill   • amLODIPine (NORVASC) 5 MG tablet TAKE 1 TABLET BY MOUTH 2 (TWO) TIMES A DAY. 60 tablet 11   • calcium carbonate 1250 (500 CA) MG/5ML Take  by mouth 2 (Two) Times a Day.     • cloNIDine (CATAPRES-TTS) 0.3 MG/24HR patch Place 1 patch on the skin as directed by provider 1 (One) Time Per Week. 12 patch 3   • clopidogrel (PLAVIX) 75 MG tablet Take 1 tablet by mouth Daily. 30 tablet 11   • Cyanocobalamin 5000 MCG capsule Take 1 capsule by mouth. Weekly     • Ergocalciferol (VITAMIN D2 PO) Take 50,000 Units by mouth Every 30 (Thirty) Days.     • fluticasone (FLONASE) 50 MCG/ACT nasal spray 1 spray into each nostril 2 (Two) Times a Day. Administer 1 spray in each nostril twice daily. 1 bottle 11   • hydrALAZINE (APRESOLINE) 50 MG tablet 1 in a.m. and 2 in p.m.  May also use an additional tablet as needed for elevated blood pressure. 270 tablet 3   • iron polysaccharides (Poly-Iron 150) 150 MG capsule Three times weekly 36 capsule 3   • levETIRAcetam XR (KEPPRA XR) 500 MG 24 hr tablet TAKE 6 TABLETS EVERY EVENING WITH SUPPER 180 tablet 11   • olmesartan (Benicar) 40 MG tablet Take 1 tablet by mouth Every Night. 90 tablet 3   • pantoprazole (PROTONIX) 40 MG EC tablet Take 1 tablet by mouth 2 (Two) Times a Day. 60 tablet 11   • phenytoin (DILANTIN) 100 MG ER capsule TAKE 3 CAPSULES BY MOUTH EVERY NIGHT 90 capsule 11   • POLY-IRON 150 150 MG capsule TAKE 1 CAPSULE BY MOUTH TWICE DAILY (Patient taking differently: 3 times a week) 180 capsule 3   • Probiotic Product (PROBIOTIC DAILY PO) Take  by mouth 2 (Two) Times a Week.     • simvastatin (ZOCOR) 40 MG tablet TAKE 1 TABLET BY MOUTH DAILY AT BEDTIME 90 tablet 3   • sotalol (BETAPACE) 80 MG tablet TAKE 1 TABLET BY MOUTH TWICE DAILY 60 tablet 11   • trimethoprim (TRIMPEX) 100 MG tablet  TAKE 1 TABLET BY MOUTH DAILY. **TO BE STARTED AFTER COMPLETING 7 DAYS OF CEFTIN (CEFUROXIME)** 30 tablet 6   • trimethoprim (TRIMPEX) 100 MG tablet Take 1 tablet by mouth Daily. 30 tablet 11   • venlafaxine XR (EFFEXOR-XR) 75 MG 24 hr capsule TAKE 1 CAPSULE BY MOUTH DAILY 30 capsule 11   • vitamin D (ERGOCALCIFEROL) 1.25 MG (87751 UT) capsule capsule TAKE 1 CAPSULE BY MOUTH EVERY 7 (SEVEN) DAYS. 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)   • Mild dehydration   • Iron deficiency anemia   • Mixed hyperlipidemia   • Homonymous hemianopia - left   • Essential hypertension   • Dysuria   • Disorder of cardiovascular system   • Dietary vitamin B12 deficiency anemia   • Diarrhea   • Delirium due to known physiological condition   • Chronic systolic congestive heart failure (CMS/HCC) - EF 30%   • Closed fracture of ankle   • Chronic obstructive lung disease (CMS/HCC)   • Carotid artery stenosis   • Cardiomyopathy (CMS/HCC)   • Allergic rhinitis   • Acute otitis media, left   • Acute exacerbation of chronic obstructive airways disease (CMS/HCC)   • Seizure disorder (CMS/HCC)   • Recurrent major depressive disorder, in partial remission (CMS/HCC)   • History of sustained ventricular tachycardia - S/P ICD placement   • Fall in elderly patient   • Labile hypertension   • Recurrent UTI (urinary tract infection)       Advanced Care Planning:  ACP discussion was held with the patient during this visit. Patient does not  "have an advance directive, information provided.    Review of Systems    Compared to one year ago, the patient feels her physical health is the same.  Compared to one year ago, the patient feels her mental health is the same.    Reviewed chart for potential of high risk medication in the elderly: yes  Reviewed chart for potential of harmful drug interactions in the elderly:yes    Objective         Vitals:    05/13/21 1021   Weight: 60.3 kg (133 lb)   Height: 160 cm (63\")   PainSc: 0-No pain       Body mass index is 23.56 kg/m².  Discussed the patient's BMI with her. The BMI is in the acceptable range.    Physical Exam          Assessment/Plan   Medicare Risks and Personalized Health Plan  CMS Preventative Services Quick Reference  Immunizations Discussed/Encouraged (specific immunizations; Tdap and Shingrix )    The above risks/problems have been discussed with the patient.  She desires Shingrix vaccination.  I have sent a prescription to her pharmacy.  We will plan on Tdap vaccination when she comes for her next appointment in 2 months.  Continue the current cardiovascular/cerebrovascular risk factor modifications.  Pertinent information has been shared with the patient in the After Visit Summary.  Follow up plans and orders are seen below in the Assessment/Plan Section.    Diagnoses and all orders for this visit:    1. Medicare annual wellness visit, initial (Primary)    2. Need for shingles vaccine    3. Need for diphtheria-tetanus-pertussis (Tdap) vaccine    Other orders  -     Zoster Vac Recomb Adjuvanted 50 MCG/0.5ML reconstituted suspension; Inject 0.5 mL into the appropriate muscle as directed by prescriber Every 2 (Two) Months.  Dispense: 1 each; Refill: 1      Follow Up:  Return for Next scheduled follow up - labs 1 week prior.     An After Visit Summary and PPPS were given to the patient.             "

## 2021-05-17 RX ORDER — VENLAFAXINE HYDROCHLORIDE 75 MG/1
75 CAPSULE, EXTENDED RELEASE ORAL DAILY
Qty: 30 CAPSULE | Refills: 11 | Status: SHIPPED | OUTPATIENT
Start: 2021-05-17 | End: 2021-11-16 | Stop reason: SDUPTHER

## 2021-05-17 RX ORDER — PHENYTOIN SODIUM 100 MG/1
300 CAPSULE, EXTENDED RELEASE ORAL NIGHTLY
Qty: 90 CAPSULE | Refills: 11 | Status: SHIPPED | OUTPATIENT
Start: 2021-05-17 | End: 2022-05-17 | Stop reason: SDUPTHER

## 2021-05-25 RX ORDER — SOTALOL HYDROCHLORIDE 80 MG/1
80 TABLET ORAL 2 TIMES DAILY
Qty: 60 TABLET | Refills: 11 | Status: SHIPPED | OUTPATIENT
Start: 2021-05-25 | End: 2022-06-01

## 2021-06-02 RX ORDER — SIMVASTATIN 40 MG
40 TABLET ORAL
Qty: 90 TABLET | Refills: 3 | Status: SHIPPED | OUTPATIENT
Start: 2021-06-02 | End: 2022-02-10

## 2021-06-09 RX ORDER — AMLODIPINE BESYLATE 5 MG/1
5 TABLET ORAL 2 TIMES DAILY
Qty: 60 TABLET | Refills: 11 | Status: SHIPPED | OUTPATIENT
Start: 2021-06-09 | End: 2022-05-06

## 2021-06-14 RX ORDER — HYDRALAZINE HYDROCHLORIDE 50 MG/1
TABLET, FILM COATED ORAL
Qty: 270 TABLET | Refills: 3 | Status: SHIPPED | OUTPATIENT
Start: 2021-06-14 | End: 2021-07-08

## 2021-07-08 RX ORDER — HYDRALAZINE HYDROCHLORIDE 50 MG/1
TABLET, FILM COATED ORAL
Qty: 360 TABLET | Refills: 3 | Status: SHIPPED | OUTPATIENT
Start: 2021-07-08 | End: 2021-12-15

## 2021-07-09 RX ORDER — ERGOCALCIFEROL 1.25 MG/1
50000 CAPSULE ORAL
Qty: 13 CAPSULE | Refills: 3 | Status: SHIPPED | OUTPATIENT
Start: 2021-07-09 | End: 2022-04-21

## 2021-07-16 ENCOUNTER — LAB (OUTPATIENT)
Dept: LAB | Facility: OTHER | Age: 86
End: 2021-07-16

## 2021-07-16 ENCOUNTER — OFFICE VISIT (OUTPATIENT)
Dept: FAMILY MEDICINE CLINIC | Facility: CLINIC | Age: 86
End: 2021-07-16

## 2021-07-16 VITALS
TEMPERATURE: 98 F | SYSTOLIC BLOOD PRESSURE: 160 MMHG | HEART RATE: 64 BPM | DIASTOLIC BLOOD PRESSURE: 60 MMHG | WEIGHT: 134.6 LBS | BODY MASS INDEX: 23.85 KG/M2 | HEIGHT: 63 IN

## 2021-07-16 DIAGNOSIS — R09.89 LABILE HYPERTENSION: Primary | ICD-10-CM

## 2021-07-16 DIAGNOSIS — I10 ESSENTIAL HYPERTENSION: Chronic | ICD-10-CM

## 2021-07-16 DIAGNOSIS — N39.0 RECURRENT URINARY TRACT INFECTION: ICD-10-CM

## 2021-07-16 DIAGNOSIS — G47.9 SLEEP DISORDER: ICD-10-CM

## 2021-07-16 PROBLEM — Z87.01 HISTORY OF ASPIRATION PNEUMONIA: Status: ACTIVE | Noted: 2021-07-16

## 2021-07-16 PROBLEM — I48.91 ATRIAL FIBRILLATION: Status: ACTIVE | Noted: 2021-07-16

## 2021-07-16 PROBLEM — Z95.810 AUTOMATIC IMPLANTABLE CARDIOVERTER-DEFIBRILLATOR IN SITU: Status: ACTIVE | Noted: 2021-07-16

## 2021-07-16 PROBLEM — J45.909 ASTHMA: Status: ACTIVE | Noted: 2021-07-16

## 2021-07-16 LAB
BACTERIA UR QL AUTO: ABNORMAL /HPF
BILIRUB UR QL STRIP: NEGATIVE
CLARITY UR: ABNORMAL
COLOR UR: YELLOW
GLUCOSE UR STRIP-MCNC: NEGATIVE MG/DL
HGB UR QL STRIP.AUTO: NEGATIVE
HYALINE CASTS UR QL AUTO: ABNORMAL /LPF
KETONES UR QL STRIP: NEGATIVE
LEUKOCYTE ESTERASE UR QL STRIP.AUTO: ABNORMAL
NITRITE UR QL STRIP: POSITIVE
PH UR STRIP.AUTO: 5.5 [PH] (ref 5.5–8)
PROT UR QL STRIP: NEGATIVE
RBC # UR: ABNORMAL /HPF
REF LAB TEST METHOD: ABNORMAL
SP GR UR STRIP: 1.02 (ref 1–1.03)
SQUAMOUS #/AREA URNS HPF: ABNORMAL /HPF
UROBILINOGEN UR QL STRIP: ABNORMAL
WBC UR QL AUTO: ABNORMAL /HPF

## 2021-07-16 PROCEDURE — 81001 URINALYSIS AUTO W/SCOPE: CPT | Performed by: INTERNAL MEDICINE

## 2021-07-16 PROCEDURE — 99214 OFFICE O/P EST MOD 30 MIN: CPT | Performed by: INTERNAL MEDICINE

## 2021-07-16 PROCEDURE — 87086 URINE CULTURE/COLONY COUNT: CPT | Performed by: INTERNAL MEDICINE

## 2021-07-16 PROCEDURE — 87186 SC STD MICRODIL/AGAR DIL: CPT | Performed by: INTERNAL MEDICINE

## 2021-07-16 PROCEDURE — 87077 CULTURE AEROBIC IDENTIFY: CPT | Performed by: INTERNAL MEDICINE

## 2021-07-16 RX ORDER — CEFUROXIME AXETIL 250 MG/1
250 TABLET ORAL 2 TIMES DAILY
Qty: 14 TABLET | Refills: 0 | Status: SHIPPED | OUTPATIENT
Start: 2021-07-16 | End: 2021-07-19 | Stop reason: SDUPTHER

## 2021-07-16 RX ORDER — QUETIAPINE FUMARATE 25 MG/1
TABLET, FILM COATED ORAL
Qty: 60 TABLET | Refills: 11 | Status: SHIPPED | OUTPATIENT
Start: 2021-07-16 | End: 2021-11-16

## 2021-07-16 RX ORDER — CEFUROXIME AXETIL 250 MG/1
250 TABLET ORAL 2 TIMES DAILY
Qty: 14 TABLET | Refills: 0 | Status: SHIPPED | OUTPATIENT
Start: 2021-07-16 | End: 2021-07-16 | Stop reason: SDUPTHER

## 2021-07-16 NOTE — PROGRESS NOTES
Chief Complaint  Hypertension (accidentally missed clonidine patch. Upon restarting she has been very weak. ) and Hot Flashes (burning up and states burning upon urination but she is on Trimpex. )    Subjective          History of Present Illness     Neisha Duran is our 86-year-old female patient with multiple complex issues including extensive vascular disease, hyperlipidemia, labile, severe hypertension, systolic CHF/cardiomyopathy, carotid artery stenosis, COPD, and iron and vitamin B12 deficiency anemia among many other issues.  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       Patient continues to struggle with labile hypertension very difficult to control.  She continues to have a very wide pulse pressure due to her extensive vascular disease..  She presents to the office today accompanied by her daughter.  Patient missed 3 days of her clonidine patch last weekend, and upon restarting the patch four days late later, this last Monday, she has been weaker than normal.  She also  had a severe headache on Tuesday after applying the clonidine patch on Monday afternoon, but her systolic blood pressure was 180 at that time.  Her daughter then removed the patch on Tuesday and she has not reapplied the clonidine patch.      Patient is also having vivid dreams/nightmares and continues to wake at night feeling a burning sensation/hot flashes.  On several nights, she sleeps very poorly.  She has a history of recurrent UTI and continues on Trimpex  We will check UA today, but are only treating symptomatic UTIs.    She continues on Effexor XR 75 mg daily and has been on that dose for quite some time.  We discussed adding low-dose Seroquel to help with some of the above issues.    Her weight is stable.  Systolic blood pressure is above goal, but diastolic is 60, so  "we must be cautious reducing the blood pressure too aggressively.      Objective   Vital Signs:   /60   Pulse 64   Temp 98 °F (36.7 °C) (Tympanic)   Ht 160 cm (63\")   Wt 61.1 kg (134 lb 9.6 oz)   BMI 23.84 kg/m²         Physical Exam  Constitutional:       General: She is not in acute distress.     Appearance: She is well-developed.      Comments: Pleasant elderly female.  Accompanied by her daughter.    HENT:      Head: Normocephalic and atraumatic.      Nose: Nose normal.      Mouth/Throat:      Pharynx: No oropharyngeal exudate.   Eyes:      Pupils: Pupils are equal, round, and reactive to light.   Neck:      Thyroid: No thyromegaly.      Vascular: No JVD.   Cardiovascular:      Rate and Rhythm: Normal rate and regular rhythm.      Heart sounds: Normal heart sounds.   Pulmonary:      Effort: Pulmonary effort is normal. No accessory muscle usage or respiratory distress.      Breath sounds: Normal breath sounds. No wheezing or rales.   Abdominal:      General: Bowel sounds are normal. There is no distension.      Palpations: Abdomen is soft.      Tenderness: There is no abdominal tenderness.      Comments: Chronic lung sounds.    Musculoskeletal:      Cervical back: Neck supple.   Lymphadenopathy:      Cervical: No cervical adenopathy.   Neurological:      Mental Status: She is alert and oriented to person, place, and time.      Cranial Nerves: No cranial nerve deficit.   Psychiatric:         Speech: Speech normal.         Behavior: Behavior normal.         Thought Content: Thought content normal.         Judgment: Judgment normal.            Result Review :     Common labs    Common Labsle 1/7/21 1/7/21 1/7/21    [id number]   Glucose  106 (A)    BUN  15    Creatinine  0.90    eGFR Non African Am  59    Sodium  139    Potassium  4.6    Chloride  105    Calcium  9.5    Albumin  4.10    Total Bilirubin  0.5    Alkaline Phosphatase  45    AST (SGOT)  24    ALT (SGPT)  12    WBC 4.55     Hemoglobin 12.8  "    Hematocrit 38.2     Platelets 225     LDL Cholesterol    89   (A) Abnormal value                 Future Appointments   Date Time Provider Department Center   8/13/2021 10:30 AM Keith Causey MD [facility]          Assessment and Plan    Diagnoses and all orders for this visit:    1. Labile hypertension (Primary)    2. Essential hypertension    3. Recurrent urinary tract infection  -     Urinalysis With Culture If Indicated - Urine, Clean Catch; Future    4. Sleep disorder    Other orders  -     QUEtiapine (SEROquel) 25 MG tablet; 1-2 po qhs for sleep and nightmares  Dispense: 60 tablet; Refill: 11         I spent 31 minutes caring for Neisha on this date of service. This time includes time spent by me in the following activities:preparing for the visit, reviewing tests, obtaining and/or reviewing a separately obtained history, performing a medically appropriate examination and/or evaluation , counseling and educating the patient/family/caregiver, ordering medications, tests, or procedures, documenting information in the medical record and independently interpreting results and communicating that information with the patient/family/caregiver     To help with sleep and decrease the vivid dreams keeping her awake at night, we will start Seroquel to take 25 mg q.h.s. for a week increasing to 50 mg if needed for sleep.  I am hopeful that this will improve her response to the Effexor XR and also help with the nightmares and sensation of waking up with hot flash/burning sensation.    To help manage her severe labile BP, we will resume 1/2 patch of clonidine patch daily with a goal of keeping systolic lower than 160.  If systolic is higher, we will have them add the remaining 1/2 patch.  Continue other BP medications as prescribed.  Continue to monitor BP.    Order is sent for patient to stop by the lab to check a clean-catch UA today.  Continue the trimethoprim daily prophylaxis.  I will only treat UTI today there  is clear evidence of an acute infection, as she seems to be relatively asymptomatic.  She usually has mental status changes when she develops a symptomatic UTI.  Explained to patient this needs to be clean catch.   We will notify patient with results and treat pending results.     Return 08/13/2021 for routine follow up with fasting labs prior.           Scribed for Dr. Causey by Erin Cavazos [facility].     ADDENDUM: Her urinalysis is cloudy with 4+ bacteria and white cells too numerous to count.  Treat with Ceftin 250 mg p.o. twice daily for 1 week, then resume the daily trimethoprim prophylaxis.  Urine culture is pending.  We will provide different instructions if her urine culture suggests the need to do so.    Follow Up   Return for Next scheduled follow up, Next scheduled follow up - labs 1 week prior.  Patient was given instructions and counseling regarding her condition or for health maintenance advice. Please see specific information pulled into the AVS if appropriate.

## 2021-07-18 LAB — BACTERIA SPEC AEROBE CULT: ABNORMAL

## 2021-07-19 RX ORDER — CEFUROXIME AXETIL 250 MG/1
250 TABLET ORAL 2 TIMES DAILY
Qty: 10 TABLET | Refills: 0 | Status: SHIPPED | OUTPATIENT
Start: 2021-07-19 | End: 2021-08-17

## 2021-07-19 NOTE — PROGRESS NOTES
Please inform Vangie that the urine culture is growing Klebsiella, which is a fairly aggressive urinary tract bacteria.  It appears to be very sensitive to Ceftin, but needs a longer course of treatment.  Add 5 additional days of Ceftin 250 mg p.o. twice daily, which will give her a total of 12 days of treatment.  Resume the trimethoprim after completing the Ceftin.  I believe Rafael is already taking a probiotic, but if not, she should take a daily probiotic such as Florastor for the next 2 weeks.  EB

## 2021-07-22 ENCOUNTER — LAB (OUTPATIENT)
Dept: LAB | Facility: OTHER | Age: 86
End: 2021-07-22

## 2021-07-22 DIAGNOSIS — G40.909 SEIZURE DISORDER (HCC): Chronic | ICD-10-CM

## 2021-07-22 DIAGNOSIS — R09.89 LABILE HYPERTENSION: Chronic | ICD-10-CM

## 2021-07-22 DIAGNOSIS — M81.0 AGE-RELATED OSTEOPOROSIS WITHOUT CURRENT PATHOLOGICAL FRACTURE: Chronic | ICD-10-CM

## 2021-07-22 DIAGNOSIS — I10 ESSENTIAL HYPERTENSION: Chronic | ICD-10-CM

## 2021-07-22 DIAGNOSIS — D51.8 DIETARY VITAMIN B12 DEFICIENCY ANEMIA: Chronic | ICD-10-CM

## 2021-07-22 DIAGNOSIS — I50.22 CHRONIC SYSTOLIC CONGESTIVE HEART FAILURE (HCC): Chronic | ICD-10-CM

## 2021-07-22 DIAGNOSIS — E55.9 VITAMIN D DEFICIENCY: Chronic | ICD-10-CM

## 2021-07-22 DIAGNOSIS — E78.2 MIXED HYPERLIPIDEMIA: Chronic | ICD-10-CM

## 2021-07-22 DIAGNOSIS — D50.8 IRON DEFICIENCY ANEMIA SECONDARY TO INADEQUATE DIETARY IRON INTAKE: Chronic | ICD-10-CM

## 2021-07-22 LAB
25(OH)D3 SERPL-MCNC: 68.8 NG/ML (ref 30–100)
ALBUMIN SERPL-MCNC: 4.2 G/DL (ref 3.5–5)
ALBUMIN/GLOB SERPL: 1.5 G/DL (ref 1.1–1.8)
ALP SERPL-CCNC: 37 U/L (ref 38–126)
ALT SERPL W P-5'-P-CCNC: 18 U/L
ANION GAP SERPL CALCULATED.3IONS-SCNC: 6 MMOL/L (ref 5–15)
AST SERPL-CCNC: 29 U/L (ref 14–36)
BASOPHILS # BLD AUTO: 0.09 10*3/MM3 (ref 0–0.2)
BASOPHILS NFR BLD AUTO: 1.5 % (ref 0–1.5)
BILIRUB SERPL-MCNC: 0.5 MG/DL (ref 0.2–1.3)
BNP SERPL-MCNC: 77.7 PG/ML (ref 0–100)
BUN SERPL-MCNC: 23 MG/DL (ref 7–23)
BUN/CREAT SERPL: 23.7 (ref 7–25)
CALCIUM SPEC-SCNC: 9.4 MG/DL (ref 8.4–10.2)
CHLORIDE SERPL-SCNC: 109 MMOL/L (ref 101–112)
CHOLEST SERPL-MCNC: 172 MG/DL (ref 150–200)
CO2 SERPL-SCNC: 25 MMOL/L (ref 22–30)
CREAT SERPL-MCNC: 0.97 MG/DL (ref 0.52–1.04)
DEPRECATED RDW RBC AUTO: 41.2 FL (ref 37–54)
EOSINOPHIL # BLD AUTO: 0.13 10*3/MM3 (ref 0–0.4)
EOSINOPHIL NFR BLD AUTO: 2.1 % (ref 0.3–6.2)
ERYTHROCYTE [DISTWIDTH] IN BLOOD BY AUTOMATED COUNT: 12.1 % (ref 12.3–15.4)
FERRITIN SERPL-MCNC: 82.7 NG/ML (ref 13–150)
GFR SERPL CREATININE-BSD FRML MDRD: 54 ML/MIN/1.73 (ref 39–90)
GLOBULIN UR ELPH-MCNC: 2.8 GM/DL (ref 2.3–3.5)
GLUCOSE SERPL-MCNC: 112 MG/DL (ref 70–99)
HCT VFR BLD AUTO: 37.4 % (ref 34–46.6)
HDLC SERPL-MCNC: 71 MG/DL (ref 40–59)
HGB BLD-MCNC: 13 G/DL (ref 12–15.9)
LDLC SERPL CALC-MCNC: 76 MG/DL
LDLC/HDLC SERPL: 1 {RATIO} (ref 0–3.22)
LYMPHOCYTES # BLD AUTO: 1.26 10*3/MM3 (ref 0.7–3.1)
LYMPHOCYTES NFR BLD AUTO: 20.3 % (ref 19.6–45.3)
MCH RBC QN AUTO: 33.4 PG (ref 26.6–33)
MCHC RBC AUTO-ENTMCNC: 34.8 G/DL (ref 31.5–35.7)
MCV RBC AUTO: 96.1 FL (ref 79–97)
MONOCYTES # BLD AUTO: 0.56 10*3/MM3 (ref 0.1–0.9)
MONOCYTES NFR BLD AUTO: 9 % (ref 5–12)
NEUTROPHILS NFR BLD AUTO: 4.16 10*3/MM3 (ref 1.7–7)
NEUTROPHILS NFR BLD AUTO: 67.1 % (ref 42.7–76)
PHENYTOIN SERPL-MCNC: 10.4 MCG/ML (ref 10–20)
PLATELET # BLD AUTO: 258 10*3/MM3 (ref 140–450)
PMV BLD AUTO: 9.2 FL (ref 6–12)
POTASSIUM SERPL-SCNC: 4.6 MMOL/L (ref 3.4–5)
PROT SERPL-MCNC: 7 G/DL (ref 6.3–8.6)
RBC # BLD AUTO: 3.89 10*6/MM3 (ref 3.77–5.28)
SODIUM SERPL-SCNC: 140 MMOL/L (ref 137–145)
TRIGL SERPL-MCNC: 149 MG/DL
TSH SERPL DL<=0.05 MIU/L-ACNC: 3.03 UIU/ML (ref 0.27–4.2)
VIT B12 BLD-MCNC: 921 PG/ML (ref 211–946)
VLDLC SERPL-MCNC: 25 MG/DL (ref 5–40)
WBC # BLD AUTO: 6.2 10*3/MM3 (ref 3.4–10.8)

## 2021-07-22 PROCEDURE — 80185 ASSAY OF PHENYTOIN TOTAL: CPT | Performed by: INTERNAL MEDICINE

## 2021-07-22 PROCEDURE — 80053 COMPREHEN METABOLIC PANEL: CPT | Performed by: INTERNAL MEDICINE

## 2021-07-22 PROCEDURE — 83880 ASSAY OF NATRIURETIC PEPTIDE: CPT | Performed by: INTERNAL MEDICINE

## 2021-07-22 PROCEDURE — 36415 COLL VENOUS BLD VENIPUNCTURE: CPT | Performed by: INTERNAL MEDICINE

## 2021-07-22 PROCEDURE — 84443 ASSAY THYROID STIM HORMONE: CPT | Performed by: INTERNAL MEDICINE

## 2021-07-22 PROCEDURE — 82728 ASSAY OF FERRITIN: CPT | Performed by: INTERNAL MEDICINE

## 2021-07-22 PROCEDURE — 82607 VITAMIN B-12: CPT | Performed by: INTERNAL MEDICINE

## 2021-07-22 PROCEDURE — 82306 VITAMIN D 25 HYDROXY: CPT | Performed by: INTERNAL MEDICINE

## 2021-07-22 PROCEDURE — 85025 COMPLETE CBC W/AUTO DIFF WBC: CPT | Performed by: INTERNAL MEDICINE

## 2021-07-22 PROCEDURE — 80061 LIPID PANEL: CPT | Performed by: INTERNAL MEDICINE

## 2021-08-17 ENCOUNTER — OFFICE VISIT (OUTPATIENT)
Dept: FAMILY MEDICINE CLINIC | Facility: CLINIC | Age: 86
End: 2021-08-17

## 2021-08-17 VITALS
HEIGHT: 63 IN | BODY MASS INDEX: 23.74 KG/M2 | DIASTOLIC BLOOD PRESSURE: 50 MMHG | SYSTOLIC BLOOD PRESSURE: 141 MMHG | HEART RATE: 65 BPM | WEIGHT: 134 LBS

## 2021-08-17 DIAGNOSIS — E55.9 VITAMIN D DEFICIENCY: Chronic | ICD-10-CM

## 2021-08-17 DIAGNOSIS — R09.89 LABILE HYPERTENSION: ICD-10-CM

## 2021-08-17 DIAGNOSIS — I10 ESSENTIAL HYPERTENSION: Primary | Chronic | ICD-10-CM

## 2021-08-17 DIAGNOSIS — N39.0 RECURRENT UTI (URINARY TRACT INFECTION): Chronic | ICD-10-CM

## 2021-08-17 DIAGNOSIS — G40.909 SEIZURE DISORDER (HCC): Chronic | ICD-10-CM

## 2021-08-17 DIAGNOSIS — E78.2 MIXED HYPERLIPIDEMIA: Chronic | ICD-10-CM

## 2021-08-17 DIAGNOSIS — I50.22 CHRONIC SYSTOLIC CONGESTIVE HEART FAILURE (HCC): Chronic | ICD-10-CM

## 2021-08-17 DIAGNOSIS — I47.29 PAROXYSMAL VENTRICULAR TACHYCARDIA (HCC): Chronic | ICD-10-CM

## 2021-08-17 DIAGNOSIS — D51.8 DIETARY VITAMIN B12 DEFICIENCY ANEMIA: Chronic | ICD-10-CM

## 2021-08-17 DIAGNOSIS — G47.9 SLEEP DISORDER: Chronic | ICD-10-CM

## 2021-08-17 DIAGNOSIS — D50.8 IRON DEFICIENCY ANEMIA SECONDARY TO INADEQUATE DIETARY IRON INTAKE: Chronic | ICD-10-CM

## 2021-08-17 DIAGNOSIS — F33.41 RECURRENT MAJOR DEPRESSIVE DISORDER, IN PARTIAL REMISSION (HCC): Chronic | ICD-10-CM

## 2021-08-17 PROCEDURE — G2025 DIS SITE TELE SVCS RHC/FQHC: HCPCS | Performed by: INTERNAL MEDICINE

## 2021-08-17 RX ORDER — CLONIDINE HYDROCHLORIDE 0.1 MG/1
0.1 TABLET ORAL 2 TIMES DAILY
Qty: 60 TABLET | Refills: 11 | Status: SHIPPED | OUTPATIENT
Start: 2021-08-17 | End: 2021-09-22 | Stop reason: SDUPTHER

## 2021-08-17 NOTE — PROGRESS NOTES
You have chosen to receive care through a telephone visit. Do you consent to use a telephone visit for your medical care today? Yes    Total visit time: 22 minutes.     Chief Complaint  Follow-up (6 month )    Subjective     {Problem List  Visit Diagnosis   Encounters  Notes  Medications  Labs  Result Review Imaging  Media :23}     History of Present Illness     Neisha Duran receives care via telephone visit  for 6-month follow-up of multiple complex issues including extensive vascular disease, hyperlipidemia, labile, severe hypertension, systolic CHF/cardiomyopathy, carotid artery stenosis, COPD, and iron and vitamin B12 deficiency anemia among many other issues.  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      We added Seroquel with her visit last month to help with sleep and decrease the vivid dreams keeping her awake at night.  The Seroquel has helped decrease the vivid dreams.  She continues to wake twice nightly to urinate, but feels excessively sedated when waking.  She is taking the evening dose of Seroquel around 6:30 p.m.  She continues to have the burning sensation/hot flashes at night, but feels this may be slightly improved since starting the Seroquel.    Patient continues to struggle with labile hypertension very difficult to control with a very wide pulse pressure due to her extensive vascular disease.  Last month, I had her resume 1/2 patch of clonidine patch with a goal of keeping systolic lower, then 160 adding the remaining 1/2 patch for systolic > 160.  They have not been adding the other one half patch, but she has continued to use an extra dose of hydralazine as needed for rescue when her BP continues to be elevated when waking at night with systolic frequently as high as 190s.  We are going to switch patient from the clonidine  "patch to clonidine oral tablet.      Weight is stable.      UA last month revealed UTI, which we treated with 1-week course of Ceftin 250 mg p.o. b.i.d. Urine culture grew Klebsiella.  We added  5 additional days of Ceftin 250 mg p.o. twice daily, which gave her a total of 12 days of treatment and then subsequently resuming trimethoprim after completing the Ceftin.  We are only treating symptomatic UTI in this patient.  She continues on trimethoprim prophylaxis, which has been quite helpful for the past year.    The patient's relevant past medical, surgical, and social history was reviewed in Epic.   Lab results are reviewed with the patient today. CBC unremarkable.  Fasting glucose 112.   Normal renal and liver function.   Normal thyroid screen.  Iron is at goal with oral iron three times weekly.  Cholesterol at goal with simvastatin.  Dilantin level therapeutic.   Vitamin B-12 and D at goal.       Objective   Vital Signs:   /50   Pulse 65   Ht 160 cm (63\")   Wt 60.8 kg (134 lb)   BMI 23.74 kg/m²         Physical Exam   Result Review :     CMP    CMP 1/7/21 7/22/21   Glucose 106 (A) 112 (A)   BUN 15 23   Creatinine 0.90 0.97   eGFR Non African Am 59 54   Sodium 139 140   Potassium 4.6 4.6   Chloride 105 109   Calcium 9.5 9.4   Albumin 4.10 4.20   Total Bilirubin 0.5 0.5   Alkaline Phosphatase 45 37 (A)   AST (SGOT) 24 29   ALT (SGPT) 12 18   (A) Abnormal value            CBC w/diff    CBC w/Diff 1/7/21 7/22/21   WBC 4.55 6.20   RBC 3.93 3.89   Hemoglobin 12.8 13.0   Hematocrit 38.2 37.4   MCV 97.2 (A) 96.1   MCH 32.6 33.4 (A)   MCHC 33.5 34.8   RDW 12.9 12.1 (A)   Platelets 225 258   Neutrophil Rel % 62.6 67.1   Lymphocyte Rel % 23.3 20.3   Monocyte Rel % 11.0 9.0   Eosinophil Rel % 2.2 2.1   Basophil Rel % 0.9 1.5   (A) Abnormal value            Lipid Panel    Lipid Panel 1/7/21 7/22/21   Total Cholesterol  172   Triglycerides  149   HDL Cholesterol  71 (A)   VLDL Cholesterol  25   LDL Cholesterol  89 76 "   LDL/HDL Ratio  1.00   (A) Abnormal value            TSH    TSH 7/22/21   TSH 3.030                         Assessment and Plan    Diagnoses and all orders for this visit:    1. Essential hypertension (Primary)  -     CBC Auto Differential; Future  -     Comprehensive Metabolic Panel; Future    2. Labile hypertension    3. Mixed hyperlipidemia  -     LDL Cholesterol, Direct; Future  -     TSH; Future    4. Paroxysmal ventricular tachycardia (CMS/HCC)  -     TSH; Future    5. Chronic systolic congestive heart failure (CMS/HCC) - EF 30%  -     BNP; Future    6. Vitamin D deficiency  -     Vitamin D 25 Hydroxy; Future    7. Recurrent UTI (urinary tract infection)    8. Iron deficiency anemia secondary to inadequate dietary iron intake  -     BNP; Future  -     Ferritin; Future  -     Iron Profile; Future    9. Dietary vitamin B12 deficiency anemia  -     Vitamin B12; Future    10. Recurrent major depressive disorder, in partial remission (CMS/HCC)    11. Seizure disorder (CMS/HCC)  -     Phenytoin Level, Total; Future    12. Sleep disorder    Other orders  -     cloNIDine (CATAPRES) 0.1 MG tablet; Take 1 tablet by mouth 2 (Two) Times a Day.  Dispense: 60 tablet; Refill: 11         Decrease the iron from three times weekly to once weekly on Monday.     Stop the clonidine patch and start oral clonidine tablet 0.1 mg twice daily.  Continue other BP medications and monitoring.          Continue the trimethoprim daily prophylaxis UTI.  I am only treating UTI in this patient when there is clear evidence of an acute infection.  She usually has mental status changes when she develops a symptomatic UTI.    Continue the Effexor XR for STANLEY/depression. The addition of Seroquel appears to be helping with the vivid dreams and we will have her continue.  It seems to be helping her sleep somewhat.    Continue simvastatin 40 mg nightly and dietary efforts.  Cholesterol at goal.       Continue the Protonix for upper GI issues,  stable.     Continue the Keppra and Dilantin in this patient with seizure disorder.  This is stable      Return to clinic in 6 months with fasting labs prior.     Scribed for Dr. Causey by Leandro Diego.     Follow Up   Return in about 6 months (around 2/17/2022) for Follow up in six months with labs one week prior., Next scheduled follow up - labs 1 week prior.  Patient was given instructions and counseling regarding her condition or for health maintenance advice. Please see specific information pulled into the AVS if appropriate.

## 2021-09-08 ENCOUNTER — TELEPHONE (OUTPATIENT)
Dept: FAMILY MEDICINE CLINIC | Facility: CLINIC | Age: 86
End: 2021-09-08

## 2021-09-08 RX ORDER — NITROFURANTOIN 25; 75 MG/1; MG/1
100 CAPSULE ORAL DAILY
Qty: 30 CAPSULE | Refills: 11 | Status: SHIPPED | OUTPATIENT
Start: 2021-09-08 | End: 2022-07-27

## 2021-09-08 NOTE — TELEPHONE ENCOUNTER
----- Message from Keith Causey MD sent at 9/8/2021  3:48 PM CDT -----  Regarding: RE: MEDICATION  Macrobid 100 mg po QD to replace trimethoprim. #90, RF X 3, EB  ----- Message -----  From: Hank Bhat RN  Sent: 9/8/2021   2:10 PM CDT  To: Keith Causey MD  Subject: FW: MEDICATION                                   This is the one that  has stopped making. Hank  ----- Message -----  From: Letitia Grover, [facility]  Sent: 9/8/2021   2:00 PM CDT  To: Hank Bhat RN  Subject: MEDICATION                                       trimethoprim (TRIMPEX) 100 MG tablet    PHARMACY CAN NOT GET THIS ANY MORE

## 2021-09-22 ENCOUNTER — OFFICE VISIT (OUTPATIENT)
Dept: FAMILY MEDICINE CLINIC | Facility: CLINIC | Age: 86
End: 2021-09-22

## 2021-09-22 VITALS
BODY MASS INDEX: 23.74 KG/M2 | HEIGHT: 63 IN | DIASTOLIC BLOOD PRESSURE: 72 MMHG | SYSTOLIC BLOOD PRESSURE: 160 MMHG | HEART RATE: 62 BPM | WEIGHT: 134 LBS

## 2021-09-22 DIAGNOSIS — N39.46 MIXED STRESS AND URGE URINARY INCONTINENCE: ICD-10-CM

## 2021-09-22 DIAGNOSIS — I10 ESSENTIAL HYPERTENSION: Chronic | ICD-10-CM

## 2021-09-22 DIAGNOSIS — G47.9 SLEEP DISORDER: Chronic | ICD-10-CM

## 2021-09-22 DIAGNOSIS — R09.89 LABILE HYPERTENSION: Primary | ICD-10-CM

## 2021-09-22 PROCEDURE — G2025 DIS SITE TELE SVCS RHC/FQHC: HCPCS | Performed by: INTERNAL MEDICINE

## 2021-09-22 RX ORDER — CLONIDINE HYDROCHLORIDE 0.1 MG/1
0.2 TABLET ORAL 2 TIMES DAILY
Qty: 120 TABLET | Refills: 11 | Status: SHIPPED | OUTPATIENT
Start: 2021-09-22 | End: 2022-06-23

## 2021-09-22 NOTE — PROGRESS NOTES
"You have chosen to receive care through a telephone visit. Do you consent to use a telephone visit for your medical care today? Yes     Chief Complaint  Hypertension (Diastolic is getting elevated 100 and she feels like her heart is beating out of her chest so she takes a baby aspirin. Some of her numbers have been 191/88,153/93.  Usually happens at night when she lies down )    Subjective          History of Present Illness     Neisha Duran is our 86-year-old female patient who continues to struggle with increasingly difficult to manage BP.  She has a very wide pulse pressure due to her extensive vascular disease.  Her diastolic BP has been elevated as high as 100.  For the past two weeks, she has taken a dose of hydralazine rescue medication on one occasion and an 81 mg aspirin five times.  Despite being on Plavix daily, she takes a baby aspirin on occasion when she feels her heart pounding.  She denies chest pain or pressure or other anginal symptoms. She wakes 2 to 3 times nightly to urinate and also wears a bladder control had due to urinary continence.  When she wakes, she sometimes feels like her heart is beating out of her chest.  However, HR has usually been in the high 50s when checked with these episodes, never tachycardic.      We had added Seroquel a couple of months ago to help with sleep and to help decrease the vivid dreams keeping her awake at night and see if she had improvement with the burning sensation/hot flashes at night. She has not been taking the Seroquel due to excessive daytime sedation and reports the vivid dreams/nightmares and burning sensation/hot flashes have stopped.           Objective   Vital Signs:   /72   Pulse 62   Ht 160 cm (63\")   Wt 60.8 kg (134 lb)   BMI 23.74 kg/m²         Physical Exam   Result Review :     CMP    CMP 1/7/21 7/22/21   Glucose 106 (A) 112 (A)   BUN 15 23   Creatinine 0.90 0.97   eGFR Non African Am 59 54   Sodium 139 140   Potassium 4.6 4.6 "   Chloride 105 109   Calcium 9.5 9.4   Albumin 4.10 4.20   Total Bilirubin 0.5 0.5   Alkaline Phosphatase 45 37 (A)   AST (SGOT) 24 29   ALT (SGPT) 12 18   (A) Abnormal value            CBC w/diff    CBC w/Diff 1/7/21 7/22/21   WBC 4.55 6.20   RBC 3.93 3.89   Hemoglobin 12.8 13.0   Hematocrit 38.2 37.4   MCV 97.2 (A) 96.1   MCH 32.6 33.4 (A)   MCHC 33.5 34.8   RDW 12.9 12.1 (A)   Platelets 225 258   Neutrophil Rel % 62.6 67.1   Lymphocyte Rel % 23.3 20.3   Monocyte Rel % 11.0 9.0   Eosinophil Rel % 2.2 2.1   Basophil Rel % 0.9 1.5   (A) Abnormal value            Renal Profile    Renal Profile 1/7/21 7/22/21   BUN 15 23   Creatinine 0.90 0.97   eGFR Non  Am 59 54                      Future Appointments   Date Time Provider Department Center   2/25/2022  1:00 PM Keith Causey MD [facility]     Assessment and Plan    Diagnoses and all orders for this visit:    1. Labile hypertension (Primary)    2. Essential hypertension    3. Sleep disorder    4. Mixed stress and urge urinary incontinence    Other orders  -     cloNIDine (CATAPRES) 0.1 MG tablet; Take 2 tablets by mouth 2 (Two) Times a Day.  Dispense: 120 tablet; Refill: 11         I spent 20 minutes caring for Neisha on this date of service. This time includes time spent by me in the following activities:preparing for the visit, reviewing tests, obtaining and/or reviewing a separately obtained history, counseling and educating the patient/family/caregiver, ordering medications, tests, or procedures, documenting information in the medical record and independently interpreting results and communicating that information with the patient/family/caregiver     To help manage her severe labile BP, increase clonidine 0.1 mg from one b.i.d. to two tablets b.i.d.  Continue all other BP medications and monitoring. We want t her to stop taking the extra 81 mg aspirin when waking at night.  She is already on Plavix and the combination increases bleeding risk.      I  was questioning whether anxiety was causing the nighttime awakenings and heart pounding sensation, but she denies any uncontrolled anxiety or any panic attacks.    Return 02/25/2022 for routine follow up with fasting labs one week prior or sooner if needed.  I asked her to call the office next week with BP and HR readings and update.      Scribed for Dr. Causey by Erin Cavazos [facility].     Follow Up   Return for Next scheduled follow up, Next scheduled follow up - labs 1 week prior.  Patient was given instructions and counseling regarding her condition or for health maintenance advice. Please see specific information pulled into the AVS if appropriate.

## 2021-09-28 ENCOUNTER — TELEPHONE (OUTPATIENT)
Dept: FAMILY MEDICINE CLINIC | Facility: CLINIC | Age: 86
End: 2021-09-28

## 2021-09-28 NOTE — TELEPHONE ENCOUNTER
Relayed new instructions. TP        ----- Message from Keith Causey MD sent at 9/28/2021 12:08 PM CDT -----  Use a dose of Imodium with any episode of diarrhea, taking up to 5 tablets daily.  Likely some viral syndrome, which should resolve over the next few days.  Encourage adequate hydration and have her eat bland foods until symptoms resolve.  Notify us if she gets worse instead of better.  EB  ----- Message -----  From: Hank Bhat RN  Sent: 9/28/2021  11:37 AM CDT  To: Keith Causey MD    [facility] called and states patient had episode of diarrhea 09/23 and they gave her Imodium and Tylenol . Temp was 98.8. . She has decreased her eating and drinking. Had another episode of diarrhea yesterday but didn't give Imodium. Temp still 98. Vangie states she just c/o she doesn't feel good and feels like she has had a temp. Grandkids were there 2 weeks ago but they werent sick. Too soon for a telephone appt. Please advise. TP

## 2021-10-04 ENCOUNTER — OFFICE VISIT (OUTPATIENT)
Dept: FAMILY MEDICINE CLINIC | Facility: CLINIC | Age: 86
End: 2021-10-04

## 2021-10-04 ENCOUNTER — LAB (OUTPATIENT)
Dept: LAB | Facility: OTHER | Age: 86
End: 2021-10-04

## 2021-10-04 VITALS
TEMPERATURE: 97.7 F | DIASTOLIC BLOOD PRESSURE: 62 MMHG | WEIGHT: 134.6 LBS | SYSTOLIC BLOOD PRESSURE: 150 MMHG | BODY MASS INDEX: 23.85 KG/M2 | OXYGEN SATURATION: 94 % | HEIGHT: 63 IN | HEART RATE: 61 BPM

## 2021-10-04 DIAGNOSIS — R09.89 CHEST CONGESTION: ICD-10-CM

## 2021-10-04 DIAGNOSIS — N39.0 RECURRENT UTI (URINARY TRACT INFECTION): Primary | ICD-10-CM

## 2021-10-04 DIAGNOSIS — E87.6 HYPOKALEMIA DUE TO EXCESSIVE GASTROINTESTINAL LOSS OF POTASSIUM: Primary | ICD-10-CM

## 2021-10-04 DIAGNOSIS — R19.7 DIARRHEA, UNSPECIFIED TYPE: ICD-10-CM

## 2021-10-04 DIAGNOSIS — N39.0 RECURRENT UTI (URINARY TRACT INFECTION): ICD-10-CM

## 2021-10-04 DIAGNOSIS — R50.9 FEVER, UNSPECIFIED FEVER CAUSE: ICD-10-CM

## 2021-10-04 DIAGNOSIS — I10 ESSENTIAL HYPERTENSION: Chronic | ICD-10-CM

## 2021-10-04 LAB
ALBUMIN SERPL-MCNC: 3.5 G/DL (ref 3.5–5)
ALBUMIN/GLOB SERPL: 1.3 G/DL (ref 1.1–1.8)
ALP SERPL-CCNC: 41 U/L (ref 38–126)
ALT SERPL W P-5'-P-CCNC: 21 U/L
ANION GAP SERPL CALCULATED.3IONS-SCNC: 9 MMOL/L (ref 5–15)
AST SERPL-CCNC: 20 U/L (ref 14–36)
BASOPHILS # BLD AUTO: 0.04 10*3/MM3 (ref 0–0.2)
BASOPHILS NFR BLD AUTO: 0.5 % (ref 0–1.5)
BILIRUB SERPL-MCNC: 0.3 MG/DL (ref 0.2–1.3)
BILIRUB UR QL STRIP: NEGATIVE
BUN SERPL-MCNC: 13 MG/DL (ref 7–23)
BUN/CREAT SERPL: 16.9 (ref 7–25)
CALCIUM SPEC-SCNC: 8.8 MG/DL (ref 8.4–10.2)
CHLORIDE SERPL-SCNC: 111 MMOL/L (ref 101–112)
CLARITY UR: ABNORMAL
CO2 SERPL-SCNC: 20 MMOL/L (ref 22–30)
COLOR UR: YELLOW
CREAT SERPL-MCNC: 0.77 MG/DL (ref 0.52–1.04)
DEPRECATED RDW RBC AUTO: 42.7 FL (ref 37–54)
EOSINOPHIL # BLD AUTO: 0.36 10*3/MM3 (ref 0–0.4)
EOSINOPHIL NFR BLD AUTO: 4.2 % (ref 0.3–6.2)
ERYTHROCYTE [DISTWIDTH] IN BLOOD BY AUTOMATED COUNT: 12.4 % (ref 12.3–15.4)
GFR SERPL CREATININE-BSD FRML MDRD: 71 ML/MIN/1.73 (ref 39–90)
GLOBULIN UR ELPH-MCNC: 2.8 GM/DL (ref 2.3–3.5)
GLUCOSE SERPL-MCNC: 202 MG/DL (ref 70–99)
GLUCOSE UR STRIP-MCNC: NEGATIVE MG/DL
HCT VFR BLD AUTO: 36.7 % (ref 34–46.6)
HGB BLD-MCNC: 12.3 G/DL (ref 12–15.9)
HGB UR QL STRIP.AUTO: NEGATIVE
KETONES UR QL STRIP: NEGATIVE
LEUKOCYTE ESTERASE UR QL STRIP.AUTO: NEGATIVE
LYMPHOCYTES # BLD AUTO: 0.66 10*3/MM3 (ref 0.7–3.1)
LYMPHOCYTES NFR BLD AUTO: 7.7 % (ref 19.6–45.3)
MCH RBC QN AUTO: 32.9 PG (ref 26.6–33)
MCHC RBC AUTO-ENTMCNC: 33.5 G/DL (ref 31.5–35.7)
MCV RBC AUTO: 98.1 FL (ref 79–97)
MONOCYTES # BLD AUTO: 0.59 10*3/MM3 (ref 0.1–0.9)
MONOCYTES NFR BLD AUTO: 6.9 % (ref 5–12)
NEUTROPHILS NFR BLD AUTO: 6.88 10*3/MM3 (ref 1.7–7)
NEUTROPHILS NFR BLD AUTO: 80.7 % (ref 42.7–76)
NITRITE UR QL STRIP: NEGATIVE
PH UR STRIP.AUTO: 5.5 [PH] (ref 5.5–8)
PLATELET # BLD AUTO: 250 10*3/MM3 (ref 140–450)
PMV BLD AUTO: 9.3 FL (ref 6–12)
POTASSIUM SERPL-SCNC: 3 MMOL/L (ref 3.4–5)
PROT SERPL-MCNC: 6.3 G/DL (ref 6.3–8.6)
PROT UR QL STRIP: ABNORMAL
RBC # BLD AUTO: 3.74 10*6/MM3 (ref 3.77–5.28)
SODIUM SERPL-SCNC: 140 MMOL/L (ref 137–145)
SP GR UR STRIP: 1.01 (ref 1–1.03)
UROBILINOGEN UR QL STRIP: ABNORMAL
WBC # BLD AUTO: 8.53 10*3/MM3 (ref 3.4–10.8)

## 2021-10-04 PROCEDURE — 80053 COMPREHEN METABOLIC PANEL: CPT | Performed by: INTERNAL MEDICINE

## 2021-10-04 PROCEDURE — 36415 COLL VENOUS BLD VENIPUNCTURE: CPT | Performed by: INTERNAL MEDICINE

## 2021-10-04 PROCEDURE — 85025 COMPLETE CBC W/AUTO DIFF WBC: CPT | Performed by: INTERNAL MEDICINE

## 2021-10-04 PROCEDURE — 81003 URINALYSIS AUTO W/O SCOPE: CPT | Performed by: INTERNAL MEDICINE

## 2021-10-04 PROCEDURE — 99214 OFFICE O/P EST MOD 30 MIN: CPT | Performed by: INTERNAL MEDICINE

## 2021-10-04 RX ORDER — LEVOFLOXACIN 250 MG/1
250 TABLET ORAL DAILY
Qty: 10 TABLET | Refills: 0 | Status: SHIPPED | OUTPATIENT
Start: 2021-10-04 | End: 2021-10-14 | Stop reason: SDUPTHER

## 2021-10-04 NOTE — PROGRESS NOTES
"Chief Complaint  Follow-up (lab results. She isnt eating or drinking. Occasional diarrhea /loose stools )    Subjective          History of Present Illness     Neisha Duran presents to the clinic with her daughter, Vangie.  Patient is feeling extremely weak and appears ill today.  She has had three episodes of diarrhea over the past 10 days, although, not occurring daily.  She reports decreased appetite and has not been hydrating well.   Patient had labs this morning revealing hypokalemia with sodium 3.0.     She has history of recurrent UTIs.  One month ago, we switched her from trimethoprim to macrodantin for prophylaxis, as trimethoprim is no longer available.  Last reasonable quality urine culture 7/2021 grew Klebsiella species.  See below.  She has not been able to provide urine sample, but will try to get us one this morning.      Patient has severe labile hypertension and continues to have a very wide pulse pressure due to her extensive vascular disease.       Objective   Vital Signs:   /62 Comment: 189/81 this morning 3 am  Pulse 61   Temp 97.7 °F (36.5 °C) (Tympanic)   Ht 160 cm (63\")   Wt 61.1 kg (134 lb 9.6 oz)   SpO2 94%   BMI 23.84 kg/m²         Physical Exam  Constitutional:       General: She is not in acute distress.     Appearance: She is well-developed.   HENT:      Head: Normocephalic and atraumatic.      Nose: Nose normal.      Mouth/Throat:      Pharynx: No oropharyngeal exudate.      Comments: Thickened oral secretions.   Eyes:      Pupils: Pupils are equal, round, and reactive to light.   Neck:      Thyroid: No thyromegaly.      Vascular: No JVD.   Cardiovascular:      Rate and Rhythm: Normal rate and regular rhythm.      Heart sounds: Normal heart sounds.   Pulmonary:      Effort: Pulmonary effort is normal. No accessory muscle usage or respiratory distress.      Breath sounds: Normal breath sounds. No wheezing or rales.      Comments: Chronic lung sounds. A few rhonchi on the " right.  Abdominal:      General: Bowel sounds are normal. There is no distension.      Palpations: Abdomen is soft.      Tenderness: There is no abdominal tenderness.   Musculoskeletal:      Cervical back: Neck supple.   Lymphadenopathy:      Cervical: No cervical adenopathy.   Neurological:      Mental Status: She is alert and oriented to person, place, and time.      Cranial Nerves: No cranial nerve deficit.   Psychiatric:         Speech: Speech normal.         Behavior: Behavior normal.         Thought Content: Thought content normal.         Judgment: Judgment normal.        Result Review :     Common labs    Common Labsle 1/7/21 1/7/21 1/7/21 7/22/21 7/22/21 7/22/21 10/4/21 10/4/21    0858 0858 0858 0832 0832 0832 0844 0844   Glucose  106 (A)   112 (A)  202 (A)    BUN  15   23  13    Creatinine  0.90   0.97  0.77    eGFR Non  Am  59   54  71    Sodium  139   140  140    Potassium  4.6   4.6  3.0 (A)    Chloride  105   109  111    Calcium  9.5   9.4  8.8    Albumin  4.10   4.20  3.50    Total Bilirubin  0.5   0.5  0.3    Alkaline Phosphatase  45   37 (A)  41    AST (SGOT)  24   29  20    ALT (SGPT)  12   18  21    WBC 4.55   6.20    8.53   Hemoglobin 12.8   13.0    12.3   Hematocrit 38.2   37.4    36.7   Platelets 225   258    250   Total Cholesterol      172     Triglycerides      149     HDL Cholesterol      71 (A)     LDL Cholesterol    89   76     (A) Abnormal value            UA    Urinalysis 10/26/20 10/26/20 7/16/21 7/16/21 10/4/21    0941 0941 1311 1311    Squamous Epithelial Cells, UA  3-6 (A)  3-6 (A)    Specific Gravity, UA 1.020  1.025  1.015   Ketones, UA Negative  Negative  Negative   Blood, UA Negative  Negative  Negative   Leukocytes, UA Small (1+) (A)  Small (1+) (A)  Negative   Nitrite, UA Negative  Positive (A)  Negative   RBC, UA  0-2 (A)  0-2 (A)    WBC, UA  13-20 (A)  Too Numerous to Count (A)    Bacteria, UA  1+ (A)  4+ (A)    (A) Abnormal value            Urine Culture    Urine  Culture 10/26/20 7/16/21   Urine Culture <25,000 CFU/mL Mixed Jo Isolated >100,000 CFU/mL Klebsiella pneumoniae ssp pneumoniae (A)   (A) Abnormal value                       Future Appointments   Date Time Provider Department Center   2/25/2022  1:00 PM Keith Causey MD [facility]     Assessment and Plan    Diagnoses and all orders for this visit:    1. Hypokalemia due to excessive gastrointestinal loss of potassium (Primary)  -     Basic Metabolic Panel; Future  -     Magnesium; Future    2. Diarrhea, unspecified type  -     Basic Metabolic Panel; Future  -     Magnesium; Future    3. Recurrent UTI (urinary tract infection)    4. Chest congestion    5. Fever, unspecified fever cause    6. Essential hypertension    Other orders  -     potassium bicarbonate (K-LYTE) 25 MEQ disintegrating tablet; Take 1 tablet by mouth 2 (Two) Times a Day for 3 days.  Dispense: 6 tablet; Refill: 0  -     levoFLOXacin (Levaquin) 250 MG tablet; Take 1 tablet by mouth Daily for 10 days.  Dispense: 10 tablet; Refill: 0         I spent 31 minutes caring for [place] on this date of service. This time includes time spent by me in the following activities:preparing for the visit, reviewing tests, obtaining and/or reviewing a separately obtained history, performing a medically appropriate examination and/or evaluation , counseling and educating the patient/family/caregiver, ordering medications, tests, or procedures, documenting information in the medical record and independently interpreting results and communicating that information with the patient/family/caregiver     To treat hypokalemia, I sent a prescription for K-Lyte 25 mEq for patient to take one tablet b.i.d. x 3 days.  I asked her to try to eat 1/2 banana twice daily and hydrate with sports drinks to improve electrolytes.  Increase hydration.  We will recheck potassium on Wednesday of this week.     She will continue to try to provide a urine sample to check for acute UTI  in this patient with history of recurrent UTIs.     Return 02/2022 for routine follow up with fasting labs one week prior or sooner if acute symptoms do not improve.      Scribed for Dr. Causey by Leandro Diego.     Follow Up   Return for Next scheduled follow up.  Patient was given instructions and counseling regarding her condition or for health maintenance advice. Please see specific information pulled into the AVS if appropriate.

## 2021-10-06 ENCOUNTER — LAB (OUTPATIENT)
Dept: LAB | Facility: OTHER | Age: 86
End: 2021-10-06

## 2021-10-06 DIAGNOSIS — R19.7 DIARRHEA, UNSPECIFIED TYPE: ICD-10-CM

## 2021-10-06 DIAGNOSIS — E87.6 HYPOKALEMIA DUE TO EXCESSIVE GASTROINTESTINAL LOSS OF POTASSIUM: ICD-10-CM

## 2021-10-06 LAB
ANION GAP SERPL CALCULATED.3IONS-SCNC: 4 MMOL/L (ref 5–15)
BUN SERPL-MCNC: 11 MG/DL (ref 7–23)
BUN/CREAT SERPL: 14.1 (ref 7–25)
CALCIUM SPEC-SCNC: 8.8 MG/DL (ref 8.4–10.2)
CHLORIDE SERPL-SCNC: 108 MMOL/L (ref 101–112)
CO2 SERPL-SCNC: 25 MMOL/L (ref 22–30)
CREAT SERPL-MCNC: 0.78 MG/DL (ref 0.52–1.04)
GFR SERPL CREATININE-BSD FRML MDRD: 70 ML/MIN/1.73 (ref 39–90)
GLUCOSE SERPL-MCNC: 118 MG/DL (ref 70–99)
MAGNESIUM SERPL-MCNC: 1.8 MG/DL (ref 1.6–2.3)
POTASSIUM SERPL-SCNC: 4.5 MMOL/L (ref 3.4–5)
SODIUM SERPL-SCNC: 137 MMOL/L (ref 137–145)

## 2021-10-06 PROCEDURE — 80048 BASIC METABOLIC PNL TOTAL CA: CPT | Performed by: INTERNAL MEDICINE

## 2021-10-06 PROCEDURE — 36415 COLL VENOUS BLD VENIPUNCTURE: CPT | Performed by: INTERNAL MEDICINE

## 2021-10-06 PROCEDURE — 83735 ASSAY OF MAGNESIUM: CPT | Performed by: INTERNAL MEDICINE

## 2021-10-09 DIAGNOSIS — E87.6 HYPOKALEMIA DUE TO EXCESSIVE GASTROINTESTINAL LOSS OF POTASSIUM: ICD-10-CM

## 2021-10-09 DIAGNOSIS — R06.09 DOE (DYSPNEA ON EXERTION): ICD-10-CM

## 2021-10-09 DIAGNOSIS — R53.83 FATIGUE, UNSPECIFIED TYPE: ICD-10-CM

## 2021-10-09 DIAGNOSIS — N39.0 RECURRENT UTI (URINARY TRACT INFECTION): Primary | ICD-10-CM

## 2021-10-11 ENCOUNTER — LAB (OUTPATIENT)
Dept: LAB | Facility: OTHER | Age: 86
End: 2021-10-11

## 2021-10-11 DIAGNOSIS — R53.83 FATIGUE, UNSPECIFIED TYPE: Primary | ICD-10-CM

## 2021-10-11 DIAGNOSIS — E87.6 HYPOKALEMIA DUE TO EXCESSIVE GASTROINTESTINAL LOSS OF POTASSIUM: ICD-10-CM

## 2021-10-11 DIAGNOSIS — R53.83 FATIGUE, UNSPECIFIED TYPE: ICD-10-CM

## 2021-10-11 DIAGNOSIS — R06.09 DOE (DYSPNEA ON EXERTION): ICD-10-CM

## 2021-10-11 DIAGNOSIS — N39.0 RECURRENT UTI (URINARY TRACT INFECTION): ICD-10-CM

## 2021-10-11 LAB
ALBUMIN SERPL-MCNC: 3.7 G/DL (ref 3.5–5)
ALBUMIN/GLOB SERPL: 1.4 G/DL (ref 1.1–1.8)
ALP SERPL-CCNC: 43 U/L (ref 38–126)
ALT SERPL W P-5'-P-CCNC: 13 U/L
ANION GAP SERPL CALCULATED.3IONS-SCNC: 5 MMOL/L (ref 5–15)
AST SERPL-CCNC: 19 U/L (ref 14–36)
BASOPHILS # BLD AUTO: 0.04 10*3/MM3 (ref 0–0.2)
BASOPHILS NFR BLD AUTO: 0.7 % (ref 0–1.5)
BILIRUB SERPL-MCNC: 0.4 MG/DL (ref 0.2–1.3)
BILIRUB UR QL STRIP: NEGATIVE
BNP SERPL-MCNC: 294 PG/ML (ref 0–100)
BUN SERPL-MCNC: 11 MG/DL (ref 7–23)
BUN/CREAT SERPL: 13.3 (ref 7–25)
CALCIUM SPEC-SCNC: 9.3 MG/DL (ref 8.4–10.2)
CHLORIDE SERPL-SCNC: 110 MMOL/L (ref 101–112)
CLARITY UR: CLEAR
CO2 SERPL-SCNC: 22 MMOL/L (ref 22–30)
COLOR UR: YELLOW
CREAT SERPL-MCNC: 0.83 MG/DL (ref 0.52–1.04)
DEPRECATED RDW RBC AUTO: 41.8 FL (ref 37–54)
EOSINOPHIL # BLD AUTO: 0.05 10*3/MM3 (ref 0–0.4)
EOSINOPHIL NFR BLD AUTO: 0.9 % (ref 0.3–6.2)
ERYTHROCYTE [DISTWIDTH] IN BLOOD BY AUTOMATED COUNT: 12.2 % (ref 12.3–15.4)
GFR SERPL CREATININE-BSD FRML MDRD: 65 ML/MIN/1.73 (ref 39–90)
GLOBULIN UR ELPH-MCNC: 2.7 GM/DL (ref 2.3–3.5)
GLUCOSE SERPL-MCNC: 128 MG/DL (ref 70–99)
GLUCOSE UR STRIP-MCNC: NEGATIVE MG/DL
HCT VFR BLD AUTO: 39 % (ref 34–46.6)
HGB BLD-MCNC: 13.2 G/DL (ref 12–15.9)
HGB UR QL STRIP.AUTO: NEGATIVE
KETONES UR QL STRIP: NEGATIVE
LEUKOCYTE ESTERASE UR QL STRIP.AUTO: NEGATIVE
LYMPHOCYTES # BLD AUTO: 0.94 10*3/MM3 (ref 0.7–3.1)
LYMPHOCYTES NFR BLD AUTO: 17.5 % (ref 19.6–45.3)
MCH RBC QN AUTO: 32.7 PG (ref 26.6–33)
MCHC RBC AUTO-ENTMCNC: 33.8 G/DL (ref 31.5–35.7)
MCV RBC AUTO: 96.5 FL (ref 79–97)
MONOCYTES # BLD AUTO: 0.41 10*3/MM3 (ref 0.1–0.9)
MONOCYTES NFR BLD AUTO: 7.6 % (ref 5–12)
NEUTROPHILS NFR BLD AUTO: 3.93 10*3/MM3 (ref 1.7–7)
NEUTROPHILS NFR BLD AUTO: 73.3 % (ref 42.7–76)
NITRITE UR QL STRIP: NEGATIVE
PH UR STRIP.AUTO: 7.5 [PH] (ref 5.5–8)
PLATELET # BLD AUTO: 250 10*3/MM3 (ref 140–450)
PMV BLD AUTO: 9 FL (ref 6–12)
POTASSIUM SERPL-SCNC: 3.8 MMOL/L (ref 3.4–5)
PROT SERPL-MCNC: 6.4 G/DL (ref 6.3–8.6)
PROT UR QL STRIP: NEGATIVE
RBC # BLD AUTO: 4.04 10*6/MM3 (ref 3.77–5.28)
SODIUM SERPL-SCNC: 137 MMOL/L (ref 137–145)
SP GR UR STRIP: 1.01 (ref 1–1.03)
TSH SERPL DL<=0.05 MIU/L-ACNC: 2.07 UIU/ML (ref 0.27–4.2)
UROBILINOGEN UR QL STRIP: NORMAL
WBC # BLD AUTO: 5.37 10*3/MM3 (ref 3.4–10.8)

## 2021-10-11 PROCEDURE — 80053 COMPREHEN METABOLIC PANEL: CPT | Performed by: INTERNAL MEDICINE

## 2021-10-11 PROCEDURE — 36415 COLL VENOUS BLD VENIPUNCTURE: CPT | Performed by: INTERNAL MEDICINE

## 2021-10-11 PROCEDURE — 85025 COMPLETE CBC W/AUTO DIFF WBC: CPT | Performed by: INTERNAL MEDICINE

## 2021-10-11 PROCEDURE — 83880 ASSAY OF NATRIURETIC PEPTIDE: CPT | Performed by: INTERNAL MEDICINE

## 2021-10-11 PROCEDURE — 81003 URINALYSIS AUTO W/O SCOPE: CPT | Performed by: INTERNAL MEDICINE

## 2021-10-11 PROCEDURE — 84443 ASSAY THYROID STIM HORMONE: CPT | Performed by: INTERNAL MEDICINE

## 2021-10-11 NOTE — PROGRESS NOTES
Fax these labs to Dr. Nuñez's office, [place] [facility].  He is seeing her this morning at 11 AM.  TIM

## 2021-10-14 RX ORDER — LEVOFLOXACIN 250 MG/1
250 TABLET ORAL DAILY
Qty: 5 TABLET | Refills: 0 | Status: SHIPPED | OUTPATIENT
Start: 2021-10-14 | End: 2021-10-19

## 2021-10-20 DIAGNOSIS — K21.9 GASTROESOPHAGEAL REFLUX DISEASE WITHOUT ESOPHAGITIS: Primary | ICD-10-CM

## 2021-10-21 RX ORDER — PANTOPRAZOLE SODIUM 40 MG/1
40 TABLET, DELAYED RELEASE ORAL 2 TIMES DAILY
Qty: 60 TABLET | Refills: 11 | Status: SHIPPED | OUTPATIENT
Start: 2021-10-21 | End: 2022-10-21

## 2021-11-16 ENCOUNTER — OFFICE VISIT (OUTPATIENT)
Dept: FAMILY MEDICINE CLINIC | Facility: CLINIC | Age: 86
End: 2021-11-16

## 2021-11-16 ENCOUNTER — LAB (OUTPATIENT)
Dept: LAB | Facility: OTHER | Age: 86
End: 2021-11-16

## 2021-11-16 VITALS
DIASTOLIC BLOOD PRESSURE: 78 MMHG | TEMPERATURE: 97.5 F | WEIGHT: 131.6 LBS | BODY MASS INDEX: 23.32 KG/M2 | OXYGEN SATURATION: 95 % | SYSTOLIC BLOOD PRESSURE: 172 MMHG | HEIGHT: 63 IN | HEART RATE: 55 BPM

## 2021-11-16 DIAGNOSIS — R41.89 COGNITIVE IMPAIRMENT: Chronic | ICD-10-CM

## 2021-11-16 DIAGNOSIS — E87.6 HYPOKALEMIA DUE TO EXCESSIVE GASTROINTESTINAL LOSS OF POTASSIUM: ICD-10-CM

## 2021-11-16 DIAGNOSIS — R40.4 TRANSIENT ALTERATION OF AWARENESS: Primary | ICD-10-CM

## 2021-11-16 DIAGNOSIS — R41.0 DISORIENTATION: ICD-10-CM

## 2021-11-16 DIAGNOSIS — F33.41 RECURRENT MAJOR DEPRESSIVE DISORDER, IN PARTIAL REMISSION (HCC): Chronic | ICD-10-CM

## 2021-11-16 DIAGNOSIS — Z91.81 AT HIGH RISK FOR INJURY RELATED TO FALL: ICD-10-CM

## 2021-11-16 DIAGNOSIS — N39.0 RECURRENT UTI (URINARY TRACT INFECTION): ICD-10-CM

## 2021-11-16 DIAGNOSIS — R41.3 MEMORY LOSS: ICD-10-CM

## 2021-11-16 DIAGNOSIS — R53.81 PHYSICAL DEBILITY: ICD-10-CM

## 2021-11-16 DIAGNOSIS — R53.1 WEAKNESS: ICD-10-CM

## 2021-11-16 DIAGNOSIS — R63.0 ANOREXIA: ICD-10-CM

## 2021-11-16 DIAGNOSIS — H53.462 LEFT HOMONYMOUS HEMIANOPSIA: Chronic | ICD-10-CM

## 2021-11-16 DIAGNOSIS — I10 ESSENTIAL HYPERTENSION: Chronic | ICD-10-CM

## 2021-11-16 LAB
ALBUMIN SERPL-MCNC: 4 G/DL (ref 3.5–5)
ALBUMIN/GLOB SERPL: 1.4 G/DL (ref 1.1–1.8)
ALP SERPL-CCNC: 36 U/L (ref 38–126)
ALT SERPL W P-5'-P-CCNC: 13 U/L
ANION GAP SERPL CALCULATED.3IONS-SCNC: 6 MMOL/L (ref 5–15)
AST SERPL-CCNC: 20 U/L (ref 14–36)
BASOPHILS # BLD AUTO: 0.06 10*3/MM3 (ref 0–0.2)
BASOPHILS NFR BLD AUTO: 0.9 % (ref 0–1.5)
BILIRUB SERPL-MCNC: 0.3 MG/DL (ref 0.2–1.3)
BILIRUB UR QL STRIP: NEGATIVE
BUN SERPL-MCNC: 19 MG/DL (ref 7–23)
BUN/CREAT SERPL: 19.8 (ref 7–25)
CALCIUM SPEC-SCNC: 9.1 MG/DL (ref 8.4–10.2)
CHLORIDE SERPL-SCNC: 106 MMOL/L (ref 101–112)
CLARITY UR: ABNORMAL
CO2 SERPL-SCNC: 26 MMOL/L (ref 22–30)
COLOR UR: YELLOW
CREAT SERPL-MCNC: 0.96 MG/DL (ref 0.52–1.04)
DEPRECATED RDW RBC AUTO: 44.4 FL (ref 37–54)
EOSINOPHIL # BLD AUTO: 0.21 10*3/MM3 (ref 0–0.4)
EOSINOPHIL NFR BLD AUTO: 3.2 % (ref 0.3–6.2)
ERYTHROCYTE [DISTWIDTH] IN BLOOD BY AUTOMATED COUNT: 12.8 % (ref 12.3–15.4)
GFR SERPL CREATININE-BSD FRML MDRD: 55 ML/MIN/1.73 (ref 39–90)
GLOBULIN UR ELPH-MCNC: 2.8 GM/DL (ref 2.3–3.5)
GLUCOSE SERPL-MCNC: 111 MG/DL (ref 70–99)
GLUCOSE UR STRIP-MCNC: NEGATIVE MG/DL
HCT VFR BLD AUTO: 36.8 % (ref 34–46.6)
HGB BLD-MCNC: 11.9 G/DL (ref 12–15.9)
HGB UR QL STRIP.AUTO: NEGATIVE
KETONES UR QL STRIP: ABNORMAL
LEUKOCYTE ESTERASE UR QL STRIP.AUTO: NEGATIVE
LYMPHOCYTES # BLD AUTO: 1.08 10*3/MM3 (ref 0.7–3.1)
LYMPHOCYTES NFR BLD AUTO: 16.5 % (ref 19.6–45.3)
MCH RBC QN AUTO: 32 PG (ref 26.6–33)
MCHC RBC AUTO-ENTMCNC: 32.3 G/DL (ref 31.5–35.7)
MCV RBC AUTO: 98.9 FL (ref 79–97)
MONOCYTES # BLD AUTO: 0.59 10*3/MM3 (ref 0.1–0.9)
MONOCYTES NFR BLD AUTO: 9 % (ref 5–12)
NEUTROPHILS NFR BLD AUTO: 4.61 10*3/MM3 (ref 1.7–7)
NEUTROPHILS NFR BLD AUTO: 70.4 % (ref 42.7–76)
NITRITE UR QL STRIP: NEGATIVE
PH UR STRIP.AUTO: 5.5 [PH] (ref 5.5–8)
PLATELET # BLD AUTO: 218 10*3/MM3 (ref 140–450)
PMV BLD AUTO: 9.2 FL (ref 6–12)
POTASSIUM SERPL-SCNC: 4.6 MMOL/L (ref 3.4–5)
PROT SERPL-MCNC: 6.8 G/DL (ref 6.3–8.6)
PROT UR QL STRIP: NEGATIVE
RBC # BLD AUTO: 3.72 10*6/MM3 (ref 3.77–5.28)
SODIUM SERPL-SCNC: 138 MMOL/L (ref 137–145)
SP GR UR STRIP: 1.02 (ref 1–1.03)
UROBILINOGEN UR QL STRIP: ABNORMAL
WBC # BLD AUTO: 6.55 10*3/MM3 (ref 3.4–10.8)

## 2021-11-16 PROCEDURE — 36415 COLL VENOUS BLD VENIPUNCTURE: CPT | Performed by: INTERNAL MEDICINE

## 2021-11-16 PROCEDURE — 99214 OFFICE O/P EST MOD 30 MIN: CPT | Performed by: INTERNAL MEDICINE

## 2021-11-16 PROCEDURE — 80053 COMPREHEN METABOLIC PANEL: CPT | Performed by: INTERNAL MEDICINE

## 2021-11-16 PROCEDURE — 85025 COMPLETE CBC W/AUTO DIFF WBC: CPT | Performed by: INTERNAL MEDICINE

## 2021-11-16 PROCEDURE — 81003 URINALYSIS AUTO W/O SCOPE: CPT | Performed by: INTERNAL MEDICINE

## 2021-11-16 RX ORDER — VENLAFAXINE HYDROCHLORIDE 150 MG/1
150 CAPSULE, EXTENDED RELEASE ORAL DAILY
COMMUNITY
Start: 2021-11-16 | End: 2022-05-17 | Stop reason: SDUPTHER

## 2021-11-16 NOTE — PROGRESS NOTES
Chief Complaint  Memory Loss (Daughter states she has been forgetting things she normally doesnt. Sleeping more, not eating or drinking. Confusion at times )    Subjective          History of Present Illness     Neisha Duran presents to the clinic accompanied by her daughter, Vangie.  Her daughter is noticing her mother has had a recent mental status change and appears more confused/forgetful.  She has started forgetting to take her morning medications.  She is more lethargic with decreased appetite and hydration.  She is still quite weak and has very poor exercise tolerance; however, she states the profound weakness that was affecting her last month has improved.  She has a history of hypokalemia, but has not been eating a daily banana, as she was worried about constipation. However, she had a good bowel movement this morning.  She was able to walk to the office today with a cane, but that was very taxing.  She forgot morning medications two times recently, which is something her daughter states she doesn't ever usually forget.     She has history of recurrent UTIs.  We switched patient from trimethoprim to macrodantin in September for prophylaxis, as trimethoprim is no longer available.  Denies classic UTI symptoms including urinary frequency or dysuria.  However, she has experienced mental status changes with UTIs in the past.  Last reasonable quality urine culture 7/2021 grew Klebsiella species.    Urinalysis today is a little cloudy, but otherwise no evidence of UTI    We completed a MMSE prior to the visit today with patient scoring 22/30 indicative of moderate cognitive impairment changes.  3 item recall was decreased consistent with dementia, likely complicated by her large stroke several years ago.  We discussed medications we could use to possibly slow memory loss, but she is reluctant at this point.  She admits to feeling depressed at times despite taking 75 mg of Effexor XR daily. She thinks it would  "be good for her to  a hobby for the winter months when the days are longer and she can't get out as often.  She is quite social and appears to need more social interaction, although family is very supportive.  I recommended we check fasting labs.  If no abnormalities are found, I would like to increase her dose of Effexor to treat any depressive symptoms, which could be contributing to her mental status change.     I discussed home health physical therapy to help with her weakness and poor exercise tolerance and high fall risk.  This will also provide some additional social interaction            BP is elevated today at 172/78, although, she had walked down the hallway to the office prior to being roomed.   Her hypertension is severe and quite labile    Objective   Vital Signs:   /78   Pulse 55   Temp 97.5 °F (36.4 °C) (Tympanic)   Ht 160 cm (63\")   Wt 59.7 kg (131 lb 9.6 oz)   SpO2 95%   BMI 23.31 kg/m²       Physical Exam  Constitutional:       General: She is not in acute distress.     Appearance: She is well-developed.      Comments: Pleasant frail elderly patient accompanied by her daughter, Vangie.    HENT:      Head: Normocephalic and atraumatic.      Nose: Nose normal.      Mouth/Throat:      Pharynx: No oropharyngeal exudate.      Comments: Slightly thickened oral secretions.  Mucous membranes look a little dry  Eyes:      Pupils: Pupils are equal, round, and reactive to light.   Neck:      Thyroid: No thyromegaly.      Vascular: No JVD.   Cardiovascular:      Rate and Rhythm: Normal rate and regular rhythm.      Heart sounds: Normal heart sounds.   Pulmonary:      Effort: Pulmonary effort is normal. No accessory muscle usage or respiratory distress.      Breath sounds: Normal breath sounds. No wheezing or rales.   Abdominal:      General: Bowel sounds are normal. There is no distension.      Palpations: Abdomen is soft.      Tenderness: There is no abdominal tenderness. "   Musculoskeletal:      Cervical back: Neck supple.   Lymphadenopathy:      Cervical: No cervical adenopathy.   Neurological:      Mental Status: She is alert and oriented to person, place, and time. Mental status is at baseline.      Cranial Nerves: Cranial nerve deficit present.      Motor: Weakness present.      Gait: Gait abnormal.      Comments: Left hemianopia   Psychiatric:         Speech: Speech normal.         Behavior: Behavior normal.         Thought Content: Thought content normal.         Judgment: Judgment normal.            Result Review :     Common labs    Common Labsle 10/6/21 10/11/21 10/11/21 11/16/21 11/16/21     0724 0724 1407 1407   Glucose 118 (A)  128 (A)  111 (A)   BUN 11  11  19   Creatinine 0.78  0.83  0.96   eGFR Non African Am 70  65  55   Sodium 137  137  138   Potassium 4.5  3.8  4.6   Chloride 108  110  106   Calcium 8.8  9.3  9.1   Albumin   3.70  4.00   Total Bilirubin   0.4  0.3   Alkaline Phosphatase   43  36 (A)   AST (SGOT)   19  20   ALT (SGPT)   13  13   WBC  5.37  6.55    Hemoglobin  13.2  11.9 (A)    Hematocrit  39.0  36.8    Platelets  250  218    (A) Abnormal value            UA    Urinalysis 10/4/21 10/11/21 11/16/21   Specific [place], UA 1.015 1.015 1.025   Ketones, UA Negative Negative Trace (A)   Blood, UA Negative Negative Negative   Leukocytes, UA Negative Negative Negative   Nitrite, UA Negative Negative Negative   (A) Abnormal value            Data reviewed: MMSE           Future Appointments   Date Time Provider Department Center   2/25/2022  1:00 PM Keith Causey MD [facility]       Assessment and Plan    Diagnoses and all orders for this visit:    1. Transient alteration of awareness (Primary)  -     CBC Auto Differential; Future  -     Comprehensive Metabolic Panel; Future    2. Memory loss    3. Anorexia    4. Cognitive impairment - mmse 22/30    5. Recurrent UTI (urinary tract infection)  -     Urinalysis With Culture If Indicated - Urine, Clean Catch;  Future    6. Essential hypertension    7. Physical debility    8. Weakness    9. At high risk for injury related to fall    10. Left homonymous hemianopsia    11. Recurrent major depressive disorder, in partial remission (HCC)         I spent 32 minutes caring for Neisha on this date of service. This time includes time spent by me in the following activities:preparing for the visit, reviewing tests, performing a medically appropriate examination and/or evaluation , counseling and educating the patient/family/caregiver, ordering medications, tests, or procedures, referring and communicating with other health care professionals , documenting information in the medical record and independently interpreting results and communicating that information with the patient/family/caregiver.  I called her daughter with lab results and we discussed management options.    Orders are placed for patient to stop by the lab today for CMP, CBC, and UA.  We will notify patient with results.  I would like to try increasing her dose of Effexor XR from 75 to 150 mg daily to help manage depression, especially over the long days in the winter months.  I think she needs more social interaction.  She definitely needs physical therapy to help with her weakness and poor exercise tolerance and current debility.  She is high risk for falls.  All of this is complicated by her left homonymous hemianopia due to large CVA previously. Rehab potential is fairly good.  We will refer to home health for PT and also have them monitor her severe, labile hypertension.    Continue the current multidrug hypertension management and monitoring.  We have been forced to settle for less than optimal blood pressure management due to side effects of additional available options.    Return in February for routine follow up with fasting labs one week prior or sooner if needed.      Scribed for Dr. Causey by Erin Cavazos [facility].     Follow Up   Return if symptoms  worsen or fail to improve, for Next scheduled follow up.  Patient was given instructions and counseling regarding her condition or for health maintenance advice. Please see specific information pulled into the AVS if appropriate.

## 2021-11-17 ENCOUNTER — HOME HEALTH ADMISSION (OUTPATIENT)
Dept: HOME HEALTH SERVICES | Facility: HOME HEALTHCARE | Age: 86
End: 2021-11-17

## 2021-11-17 DIAGNOSIS — R53.81 PHYSICAL DEBILITY: Primary | ICD-10-CM

## 2021-11-17 DIAGNOSIS — R09.89 LABILE HYPERTENSION: ICD-10-CM

## 2021-11-17 DIAGNOSIS — R53.1 WEAKNESS: ICD-10-CM

## 2021-11-17 DIAGNOSIS — Z91.81 AT HIGH RISK FOR INJURY RELATED TO FALL: ICD-10-CM

## 2021-11-19 ENCOUNTER — HOME CARE VISIT (OUTPATIENT)
Dept: HOME HEALTH SERVICES | Facility: CLINIC | Age: 86
End: 2021-11-19

## 2021-11-19 VITALS — HEART RATE: 60 BPM | DIASTOLIC BLOOD PRESSURE: 86 MMHG | SYSTOLIC BLOOD PRESSURE: 142 MMHG | OXYGEN SATURATION: 99 %

## 2021-11-19 PROCEDURE — G0151 HHCP-SERV OF PT,EA 15 MIN: HCPCS

## 2021-11-19 PROCEDURE — G0299 HHS/HOSPICE OF RN EA 15 MIN: HCPCS

## 2021-11-22 VITALS
RESPIRATION RATE: 18 BRPM | DIASTOLIC BLOOD PRESSURE: 70 MMHG | WEIGHT: 127 LBS | SYSTOLIC BLOOD PRESSURE: 138 MMHG | HEIGHT: 64 IN | BODY MASS INDEX: 21.68 KG/M2 | TEMPERATURE: 97.4 F | HEART RATE: 62 BPM

## 2021-11-23 ENCOUNTER — HOME CARE VISIT (OUTPATIENT)
Dept: HOME HEALTH SERVICES | Facility: CLINIC | Age: 86
End: 2021-11-23

## 2021-11-23 VITALS
TEMPERATURE: 98.1 F | SYSTOLIC BLOOD PRESSURE: 142 MMHG | HEART RATE: 60 BPM | RESPIRATION RATE: 18 BRPM | DIASTOLIC BLOOD PRESSURE: 78 MMHG

## 2021-11-23 PROCEDURE — G0300 HHS/HOSPICE OF LPN EA 15 MIN: HCPCS

## 2021-11-24 NOTE — HOME HEALTH
Subjective/Patient History: 86 y.o. female presents to Dr. Causey's office on 11/16/21.  Her daughter is noticing her mother has had a recent mental status change and appears more confused/forgetful.  She has started forgetting to take her morning medications.  She is more lethargic with decreased appetite and hydration.  She is still quite weak and has very poor exercise tolerance.  Home health physical therapy recommendedto help with her weakness and poor exercise tolerance and high fall risk at this time. Daughter reports that patient is doing fairly well today but is concerned that she does not participate in much activity and remains sitting or in bed most of the day. Would like patient to develop an exercise program to encourage her to get some additional activity in throughout the day. Patient reports no pain and willing to particiapte in skilled PT session    Prior Level of Function: mod i w/ all activity    Past Medical History: extensive vascular disease, hyperlipidemia, labile, severe hypertension, systolic CHF/cardiomyopathy, carotid artery stenosis, COPD, and iron and vitamin B12 deficiency anemia, CVA, homonymous hemianopsia on the left     Patient Goal: get stronger, remain home, prevent falls,

## 2021-11-26 ENCOUNTER — HOME CARE VISIT (OUTPATIENT)
Dept: HOME HEALTH SERVICES | Facility: CLINIC | Age: 86
End: 2021-11-26

## 2021-11-26 PROCEDURE — G0157 HHC PT ASSISTANT EA 15: HCPCS

## 2021-11-28 VITALS
SYSTOLIC BLOOD PRESSURE: 140 MMHG | HEART RATE: 61 BPM | RESPIRATION RATE: 18 BRPM | TEMPERATURE: 96 F | OXYGEN SATURATION: 96 % | DIASTOLIC BLOOD PRESSURE: 77 MMHG

## 2021-11-28 PROCEDURE — G0180 MD CERTIFICATION HHA PATIENT: HCPCS | Performed by: INTERNAL MEDICINE

## 2021-11-29 ENCOUNTER — HOME CARE VISIT (OUTPATIENT)
Dept: HOME HEALTH SERVICES | Facility: CLINIC | Age: 86
End: 2021-11-29

## 2021-11-29 VITALS
DIASTOLIC BLOOD PRESSURE: 60 MMHG | SYSTOLIC BLOOD PRESSURE: 130 MMHG | HEART RATE: 70 BPM | RESPIRATION RATE: 18 BRPM | TEMPERATURE: 97.6 F

## 2021-11-29 PROCEDURE — G0300 HHS/HOSPICE OF LPN EA 15 MIN: HCPCS

## 2021-11-30 ENCOUNTER — HOME CARE VISIT (OUTPATIENT)
Dept: HOME HEALTH SERVICES | Facility: CLINIC | Age: 86
End: 2021-11-30

## 2021-11-30 VITALS
RESPIRATION RATE: 18 BRPM | SYSTOLIC BLOOD PRESSURE: 145 MMHG | TEMPERATURE: 97.9 F | OXYGEN SATURATION: 96 % | DIASTOLIC BLOOD PRESSURE: 84 MMHG | HEART RATE: 62 BPM

## 2021-11-30 PROCEDURE — G0157 HHC PT ASSISTANT EA 15: HCPCS

## 2021-12-08 ENCOUNTER — HOME CARE VISIT (OUTPATIENT)
Dept: HOME HEALTH SERVICES | Facility: CLINIC | Age: 86
End: 2021-12-08

## 2021-12-08 PROCEDURE — G0299 HHS/HOSPICE OF RN EA 15 MIN: HCPCS

## 2021-12-09 ENCOUNTER — HOME CARE VISIT (OUTPATIENT)
Dept: HOME HEALTH SERVICES | Facility: CLINIC | Age: 86
End: 2021-12-09

## 2021-12-09 VITALS
RESPIRATION RATE: 22 BRPM | DIASTOLIC BLOOD PRESSURE: 72 MMHG | TEMPERATURE: 98.2 F | SYSTOLIC BLOOD PRESSURE: 130 MMHG | HEART RATE: 60 BPM

## 2021-12-09 VITALS
OXYGEN SATURATION: 98 % | RESPIRATION RATE: 20 BRPM | HEART RATE: 62 BPM | DIASTOLIC BLOOD PRESSURE: 64 MMHG | SYSTOLIC BLOOD PRESSURE: 136 MMHG

## 2021-12-09 PROCEDURE — G0151 HHCP-SERV OF PT,EA 15 MIN: HCPCS

## 2021-12-10 NOTE — HOME HEALTH
Subjective/Patient History: Patient reports that she is doing well on this date. Daughter reports that patient is still inconsistent w/ her HEP and exercise participation but is overall demosntrating some improvement w/ daily activities. States that she would like patien tto do more exercise throughout the day but is not there enough to try reinforcing in regularly and ultimately it is up to her to do her regualr exercise routine prescribed by PT. Patient states she tries to do the exercises when she can but sometimes admits that she does not want to do them. No new issues to report medically or physically at this time.

## 2021-12-13 NOTE — CASE COMMUNICATION
Reason for Referral: 86 y.o. female presents to Dr. Causey's office on 11/16/21.  Her daughter is noticing her mother has had a recent mental status change and appears more confused/forgetful.  She has started forgetting to take her morning medications.  She is more lethargic with decreased appetite and hydration.  She is still quite weak and has very poor exercise tolerance.  Home health physical therapy recommendedto help with her weak ness and poor exercise tolerance and high fall risk at this time. Daughter reports that patient is doing fairly well today but is concerned that she does not participate in much activity and remains sitting or in bed most of the day. Would like patient to develop an exercise program to encourage her to get some additional activity in throughout the day.   Prior vs Current Level of Function:  Transfers: SBA at eval; indep at DC  Gait: CG A ae eval; mod i at DC  ADLs: Min A at eval; SBA at DC  Condition: good  Reason: goals met  Discharged to: home with self care and intermittent assistance from daughter  Next MD Visit: 12/10/21

## 2021-12-15 RX ORDER — HYDRALAZINE HYDROCHLORIDE 50 MG/1
TABLET, FILM COATED ORAL
Qty: 270 TABLET | Refills: 3 | Status: SHIPPED | OUTPATIENT
Start: 2021-12-15 | End: 2023-02-27

## 2021-12-17 RX ORDER — LEVETIRACETAM 500 MG/1
TABLET, EXTENDED RELEASE ORAL
Qty: 180 TABLET | Refills: 11 | Status: SHIPPED | OUTPATIENT
Start: 2021-12-17 | End: 2022-12-15

## 2022-02-10 DIAGNOSIS — I50.22 CHRONIC SYSTOLIC CONGESTIVE HEART FAILURE: ICD-10-CM

## 2022-02-10 DIAGNOSIS — E78.2 MIXED HYPERLIPIDEMIA: Primary | ICD-10-CM

## 2022-02-10 RX ORDER — SIMVASTATIN 40 MG
40 TABLET ORAL
Qty: 90 TABLET | Refills: 3 | Status: SHIPPED | OUTPATIENT
Start: 2022-02-10 | End: 2022-06-22 | Stop reason: SDUPTHER

## 2022-02-10 RX ORDER — CLOPIDOGREL BISULFATE 75 MG/1
75 TABLET ORAL DAILY
Qty: 30 TABLET | Refills: 11 | Status: SHIPPED | OUTPATIENT
Start: 2022-02-10 | End: 2023-02-17

## 2022-02-15 RX ORDER — IRON POLYSACCHARIDE COMPLEX 150 MG
CAPSULE ORAL
Qty: 36 CAPSULE | Refills: 3 | Status: SHIPPED | OUTPATIENT
Start: 2022-02-15 | End: 2022-06-23

## 2022-04-21 DIAGNOSIS — E55.9 VITAMIN D DEFICIENCY: Primary | ICD-10-CM

## 2022-04-21 RX ORDER — ERGOCALCIFEROL 1.25 MG/1
50000 CAPSULE ORAL
Qty: 13 CAPSULE | Refills: 3 | Status: SHIPPED | OUTPATIENT
Start: 2022-04-21 | End: 2022-06-22 | Stop reason: SDUPTHER

## 2022-05-03 RX ORDER — OLMESARTAN MEDOXOMIL 40 MG/1
40 TABLET ORAL NIGHTLY
Qty: 30 TABLET | Refills: 0 | Status: SHIPPED | OUTPATIENT
Start: 2022-05-03 | End: 2022-06-01 | Stop reason: SDUPTHER

## 2022-05-06 RX ORDER — AMLODIPINE BESYLATE 5 MG/1
TABLET ORAL
Qty: 60 TABLET | Refills: 0 | Status: SHIPPED | OUTPATIENT
Start: 2022-05-06 | End: 2022-06-15

## 2022-05-17 ENCOUNTER — TELEPHONE (OUTPATIENT)
Dept: FAMILY MEDICINE CLINIC | Facility: CLINIC | Age: 87
End: 2022-05-17

## 2022-05-17 RX ORDER — VENLAFAXINE HYDROCHLORIDE 150 MG/1
150 CAPSULE, EXTENDED RELEASE ORAL DAILY
Qty: 30 CAPSULE | Refills: 0 | Status: SHIPPED | OUTPATIENT
Start: 2022-05-17 | End: 2022-06-10 | Stop reason: SDUPTHER

## 2022-05-17 RX ORDER — PHENYTOIN SODIUM 100 MG/1
300 CAPSULE, EXTENDED RELEASE ORAL NIGHTLY
Qty: 90 CAPSULE | Refills: 0 | Status: SHIPPED | OUTPATIENT
Start: 2022-05-17 | End: 2022-06-10

## 2022-05-17 NOTE — TELEPHONE ENCOUNTER
venlafaxine XR (EFFEXOR-XR) 150 MG 24 hr    phenytoin (DILANTIN) 100 MG ER capsule      [facility]

## 2022-06-01 RX ORDER — OLMESARTAN MEDOXOMIL 40 MG/1
40 TABLET ORAL NIGHTLY
Qty: 30 TABLET | Refills: 0 | Status: SHIPPED | OUTPATIENT
Start: 2022-06-01 | End: 2022-06-15

## 2022-06-01 RX ORDER — SOTALOL HYDROCHLORIDE 80 MG/1
80 TABLET ORAL 2 TIMES DAILY
Qty: 60 TABLET | Refills: 0 | Status: SHIPPED | OUTPATIENT
Start: 2022-06-01 | End: 2022-06-15

## 2022-06-09 DIAGNOSIS — G40.909 SEIZURE DISORDER: Primary | ICD-10-CM

## 2022-06-10 ENCOUNTER — TELEPHONE (OUTPATIENT)
Dept: FAMILY MEDICINE CLINIC | Facility: CLINIC | Age: 87
End: 2022-06-10

## 2022-06-10 RX ORDER — PHENYTOIN SODIUM 100 MG/1
300 CAPSULE, EXTENDED RELEASE ORAL NIGHTLY
Qty: 90 CAPSULE | Refills: 0 | Status: SHIPPED | OUTPATIENT
Start: 2022-06-10 | End: 2022-06-15

## 2022-06-10 RX ORDER — VENLAFAXINE HYDROCHLORIDE 150 MG/1
150 CAPSULE, EXTENDED RELEASE ORAL DAILY
Qty: 90 CAPSULE | Refills: 3 | Status: SHIPPED | OUTPATIENT
Start: 2022-06-10

## 2022-06-15 ENCOUNTER — LAB (OUTPATIENT)
Dept: LAB | Facility: OTHER | Age: 87
End: 2022-06-15

## 2022-06-15 DIAGNOSIS — D50.8 IRON DEFICIENCY ANEMIA SECONDARY TO INADEQUATE DIETARY IRON INTAKE: Chronic | ICD-10-CM

## 2022-06-15 DIAGNOSIS — D51.8 DIETARY VITAMIN B12 DEFICIENCY ANEMIA: Chronic | ICD-10-CM

## 2022-06-15 DIAGNOSIS — G40.909 SEIZURE DISORDER: ICD-10-CM

## 2022-06-15 DIAGNOSIS — I10 ESSENTIAL HYPERTENSION: Chronic | ICD-10-CM

## 2022-06-15 DIAGNOSIS — I47.29 PAROXYSMAL VENTRICULAR TACHYCARDIA: Chronic | ICD-10-CM

## 2022-06-15 DIAGNOSIS — E78.2 MIXED HYPERLIPIDEMIA: Chronic | ICD-10-CM

## 2022-06-15 DIAGNOSIS — G40.909 SEIZURE DISORDER: Chronic | ICD-10-CM

## 2022-06-15 DIAGNOSIS — E55.9 VITAMIN D DEFICIENCY: Chronic | ICD-10-CM

## 2022-06-15 DIAGNOSIS — I50.22 CHRONIC SYSTOLIC CONGESTIVE HEART FAILURE: Chronic | ICD-10-CM

## 2022-06-15 LAB
ALBUMIN SERPL-MCNC: 4.1 G/DL (ref 3.5–5)
ALBUMIN/GLOB SERPL: 1.2 G/DL (ref 1.1–1.8)
ALP SERPL-CCNC: 51 U/L (ref 38–126)
ALT SERPL W P-5'-P-CCNC: 60 U/L
ANION GAP SERPL CALCULATED.3IONS-SCNC: 9 MMOL/L (ref 5–15)
AST SERPL-CCNC: 84 U/L (ref 14–36)
BASOPHILS # BLD AUTO: 0.04 10*3/MM3 (ref 0–0.2)
BASOPHILS NFR BLD AUTO: 0.7 % (ref 0–1.5)
BILIRUB SERPL-MCNC: 0.5 MG/DL (ref 0.2–1.3)
BNP SERPL-MCNC: 136 PG/ML (ref 0–100)
BUN SERPL-MCNC: 18 MG/DL (ref 7–23)
BUN/CREAT SERPL: 20.5 (ref 7–25)
CALCIUM SPEC-SCNC: 9.1 MG/DL (ref 8.4–10.2)
CHLORIDE SERPL-SCNC: 109 MMOL/L (ref 101–112)
CO2 SERPL-SCNC: 24 MMOL/L (ref 22–30)
CREAT SERPL-MCNC: 0.88 MG/DL (ref 0.52–1.04)
DEPRECATED RDW RBC AUTO: 42.7 FL (ref 37–54)
EGFRCR SERPLBLD CKD-EPI 2021: 63.7 ML/MIN/1.73
EOSINOPHIL # BLD AUTO: 0.12 10*3/MM3 (ref 0–0.4)
EOSINOPHIL NFR BLD AUTO: 2.1 % (ref 0.3–6.2)
ERYTHROCYTE [DISTWIDTH] IN BLOOD BY AUTOMATED COUNT: 12.6 % (ref 12.3–15.4)
GLOBULIN UR ELPH-MCNC: 3.3 GM/DL (ref 2.3–3.5)
GLUCOSE SERPL-MCNC: 120 MG/DL (ref 70–99)
HCT VFR BLD AUTO: 38.7 % (ref 34–46.6)
HGB BLD-MCNC: 12.9 G/DL (ref 12–15.9)
LYMPHOCYTES # BLD AUTO: 0.72 10*3/MM3 (ref 0.7–3.1)
LYMPHOCYTES NFR BLD AUTO: 12.5 % (ref 19.6–45.3)
MCH RBC QN AUTO: 32 PG (ref 26.6–33)
MCHC RBC AUTO-ENTMCNC: 33.3 G/DL (ref 31.5–35.7)
MCV RBC AUTO: 96 FL (ref 79–97)
MONOCYTES # BLD AUTO: 0.5 10*3/MM3 (ref 0.1–0.9)
MONOCYTES NFR BLD AUTO: 8.7 % (ref 5–12)
NEUTROPHILS NFR BLD AUTO: 4.36 10*3/MM3 (ref 1.7–7)
NEUTROPHILS NFR BLD AUTO: 76 % (ref 42.7–76)
PLATELET # BLD AUTO: 202 10*3/MM3 (ref 140–450)
PMV BLD AUTO: 9.5 FL (ref 6–12)
POTASSIUM SERPL-SCNC: 4.4 MMOL/L (ref 3.4–5)
PROT SERPL-MCNC: 7.4 G/DL (ref 6.3–8.6)
RBC # BLD AUTO: 4.03 10*6/MM3 (ref 3.77–5.28)
SODIUM SERPL-SCNC: 142 MMOL/L (ref 137–145)
WBC NRBC COR # BLD: 5.74 10*3/MM3 (ref 3.4–10.8)

## 2022-06-15 PROCEDURE — 82607 VITAMIN B-12: CPT | Performed by: INTERNAL MEDICINE

## 2022-06-15 PROCEDURE — 84443 ASSAY THYROID STIM HORMONE: CPT | Performed by: INTERNAL MEDICINE

## 2022-06-15 PROCEDURE — 36415 COLL VENOUS BLD VENIPUNCTURE: CPT | Performed by: INTERNAL MEDICINE

## 2022-06-15 PROCEDURE — 80053 COMPREHEN METABOLIC PANEL: CPT | Performed by: INTERNAL MEDICINE

## 2022-06-15 PROCEDURE — 82728 ASSAY OF FERRITIN: CPT | Performed by: INTERNAL MEDICINE

## 2022-06-15 PROCEDURE — 83540 ASSAY OF IRON: CPT | Performed by: INTERNAL MEDICINE

## 2022-06-15 PROCEDURE — 80185 ASSAY OF PHENYTOIN TOTAL: CPT | Performed by: INTERNAL MEDICINE

## 2022-06-15 PROCEDURE — 82306 VITAMIN D 25 HYDROXY: CPT | Performed by: INTERNAL MEDICINE

## 2022-06-15 PROCEDURE — 85025 COMPLETE CBC W/AUTO DIFF WBC: CPT | Performed by: INTERNAL MEDICINE

## 2022-06-15 PROCEDURE — 83880 ASSAY OF NATRIURETIC PEPTIDE: CPT | Performed by: INTERNAL MEDICINE

## 2022-06-15 PROCEDURE — 83721 ASSAY OF BLOOD LIPOPROTEIN: CPT | Performed by: INTERNAL MEDICINE

## 2022-06-15 PROCEDURE — 84466 ASSAY OF TRANSFERRIN: CPT | Performed by: INTERNAL MEDICINE

## 2022-06-15 RX ORDER — OLMESARTAN MEDOXOMIL 40 MG/1
40 TABLET ORAL NIGHTLY
Qty: 30 TABLET | Refills: 5 | Status: SHIPPED | OUTPATIENT
Start: 2022-06-15 | End: 2022-06-22 | Stop reason: SDUPTHER

## 2022-06-15 RX ORDER — AMLODIPINE BESYLATE 5 MG/1
TABLET ORAL
Qty: 60 TABLET | Refills: 5 | Status: SHIPPED | OUTPATIENT
Start: 2022-06-15 | End: 2022-06-22 | Stop reason: SDUPTHER

## 2022-06-15 RX ORDER — PHENYTOIN SODIUM 100 MG/1
300 CAPSULE, EXTENDED RELEASE ORAL NIGHTLY
Qty: 90 CAPSULE | Refills: 5 | Status: SHIPPED | OUTPATIENT
Start: 2022-06-15 | End: 2022-06-22 | Stop reason: SDUPTHER

## 2022-06-15 RX ORDER — SOTALOL HYDROCHLORIDE 80 MG/1
80 TABLET ORAL 2 TIMES DAILY
Qty: 60 TABLET | Refills: 5 | Status: SHIPPED | OUTPATIENT
Start: 2022-06-15 | End: 2022-06-22 | Stop reason: SDUPTHER

## 2022-06-16 LAB
25(OH)D3 SERPL-MCNC: 75.2 NG/ML (ref 30–100)
ARTICHOKE IGE QN: 61 MG/DL (ref 0–100)
FERRITIN SERPL-MCNC: 133 NG/ML (ref 13–150)
IRON 24H UR-MRATE: 93 MCG/DL (ref 37–145)
IRON SATN MFR SERPL: 28 % (ref 20–50)
PHENYTOIN SERPL-MCNC: 14.1 MCG/ML (ref 10–20)
TIBC SERPL-MCNC: 335 MCG/DL (ref 298–536)
TRANSFERRIN SERPL-MCNC: 225 MG/DL (ref 200–360)
TSH SERPL DL<=0.05 MIU/L-ACNC: 2.27 UIU/ML (ref 0.27–4.2)
VIT B12 BLD-MCNC: 1012 PG/ML (ref 211–946)

## 2022-06-22 ENCOUNTER — OFFICE VISIT (OUTPATIENT)
Dept: FAMILY MEDICINE CLINIC | Facility: CLINIC | Age: 87
End: 2022-06-22

## 2022-06-22 VITALS
BODY MASS INDEX: 21.79 KG/M2 | HEIGHT: 63 IN | HEART RATE: 52 BPM | WEIGHT: 123 LBS | DIASTOLIC BLOOD PRESSURE: 56 MMHG | SYSTOLIC BLOOD PRESSURE: 108 MMHG | TEMPERATURE: 97.8 F

## 2022-06-22 DIAGNOSIS — E78.2 MIXED HYPERLIPIDEMIA: Chronic | ICD-10-CM

## 2022-06-22 DIAGNOSIS — R09.89 LABILE HYPERTENSION: Chronic | ICD-10-CM

## 2022-06-22 DIAGNOSIS — I50.22 CHRONIC SYSTOLIC CONGESTIVE HEART FAILURE: Chronic | ICD-10-CM

## 2022-06-22 DIAGNOSIS — D51.8 DIETARY VITAMIN B12 DEFICIENCY ANEMIA: Chronic | ICD-10-CM

## 2022-06-22 DIAGNOSIS — D50.8 IRON DEFICIENCY ANEMIA SECONDARY TO INADEQUATE DIETARY IRON INTAKE: Chronic | ICD-10-CM

## 2022-06-22 DIAGNOSIS — E55.9 VITAMIN D DEFICIENCY: Chronic | ICD-10-CM

## 2022-06-22 DIAGNOSIS — J43.1 PANLOBULAR EMPHYSEMA: Chronic | ICD-10-CM

## 2022-06-22 DIAGNOSIS — I47.29 PAROXYSMAL VENTRICULAR TACHYCARDIA: Chronic | ICD-10-CM

## 2022-06-22 DIAGNOSIS — G40.909 SEIZURE DISORDER: Chronic | ICD-10-CM

## 2022-06-22 DIAGNOSIS — I25.5 ISCHEMIC CARDIOMYOPATHY: Chronic | ICD-10-CM

## 2022-06-22 DIAGNOSIS — R41.89 COGNITIVE IMPAIRMENT: Chronic | ICD-10-CM

## 2022-06-22 DIAGNOSIS — R63.4 UNEXPLAINED WEIGHT LOSS: Primary | ICD-10-CM

## 2022-06-22 DIAGNOSIS — M81.0 AGE-RELATED OSTEOPOROSIS WITHOUT CURRENT PATHOLOGICAL FRACTURE: Chronic | ICD-10-CM

## 2022-06-22 DIAGNOSIS — F33.41 RECURRENT MAJOR DEPRESSIVE DISORDER, IN PARTIAL REMISSION: Chronic | ICD-10-CM

## 2022-06-22 DIAGNOSIS — Z91.81 AT RISK FOR FALLS: ICD-10-CM

## 2022-06-22 DIAGNOSIS — I48.0 PAROXYSMAL ATRIAL FIBRILLATION: Chronic | ICD-10-CM

## 2022-06-22 DIAGNOSIS — I10 ESSENTIAL HYPERTENSION: Chronic | ICD-10-CM

## 2022-06-22 DIAGNOSIS — R79.89 ELEVATED LFTS: ICD-10-CM

## 2022-06-22 PROCEDURE — 99214 OFFICE O/P EST MOD 30 MIN: CPT | Performed by: INTERNAL MEDICINE

## 2022-06-22 RX ORDER — PHENYTOIN SODIUM 100 MG/1
300 CAPSULE, EXTENDED RELEASE ORAL NIGHTLY
Qty: 270 CAPSULE | Refills: 3 | Status: SHIPPED | OUTPATIENT
Start: 2022-06-22 | End: 2022-10-27

## 2022-06-22 RX ORDER — AMLODIPINE BESYLATE 5 MG/1
5 TABLET ORAL 2 TIMES DAILY
Qty: 180 TABLET | Refills: 3 | Status: SHIPPED | OUTPATIENT
Start: 2022-06-22

## 2022-06-22 RX ORDER — OLMESARTAN MEDOXOMIL 40 MG/1
40 TABLET ORAL NIGHTLY
Qty: 90 TABLET | Refills: 3 | Status: SHIPPED | OUTPATIENT
Start: 2022-06-22

## 2022-06-22 RX ORDER — ERGOCALCIFEROL 1.25 MG/1
50000 CAPSULE ORAL
Qty: 13 CAPSULE | Refills: 3 | Status: SHIPPED | OUTPATIENT
Start: 2022-06-22 | End: 2022-10-31

## 2022-06-22 RX ORDER — SIMVASTATIN 40 MG
40 TABLET ORAL
Qty: 90 TABLET | Refills: 3 | Status: SHIPPED | OUTPATIENT
Start: 2022-06-22

## 2022-06-22 RX ORDER — SOTALOL HYDROCHLORIDE 80 MG/1
80 TABLET ORAL 2 TIMES DAILY
Qty: 180 TABLET | Refills: 3 | Status: SHIPPED | OUTPATIENT
Start: 2022-06-22

## 2022-06-22 NOTE — PROGRESS NOTES
Chief Complaint  Follow-up (6 month) and Med Refill    Subjective        History of Present Illness     Neisha Duran presents to the clinic accompanied by her daughter, Vangie, for 6-month follow-up of multiple complex issues including extensive vascular disease, hyperlipidemia, labile, severe hypertension, systolic CHF/cardiomyopathy, carotid artery stenosis, COPD, and iron and vitamin B12 deficiency anemia among many other issues.  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       Weight is down 11 pounds in the past year. She reports appetite has been decreased and her daughter has noticed she has not eating as much.  She has not been monitoring weight at home.     Patient had home health physical therapy to help with her weakness and poor exercise tolerance and high fall risk. Unfortunately, she has not continued to be compliant with home exercises and continues to experience weakness.      Patient has not had any episodes of fluctuating BP for a couple of months.  Denies recent falls. However, her BP is lower than necessary at 108/56.  She does complain of dry mouth symptoms, which could be a side effect from the clonidine.  We will try reducing her dose of clonidine as long as she does not have recurrence of the fluctuations in BP.       She has a new issue of elevated liver enzymes.  Denies recent viral illness. Denies any evidence of GI blood loss.  With the unintentional weight loss, I recommended we check CT of the abdomen and pelvis to rule out mass or lesion.       The patient's relevant past medical, surgical, and social history was reviewed in Epic.   Lab results are reviewed with the patient today. CBC reveals hemoglobin improved by 1 unit.  Iron levels improved with oral iron 3 days weekly.  Vitamin D and B-12 at goal.  Normal thyroid screen.       Objective   Vital Signs:  /56   Pulse 52   Temp 97.8 °F  "(36.6 °C) (Tympanic)   Ht 160 cm (63\")   Wt 55.8 kg (123 lb)   BMI 21.79 kg/m²   Estimated body mass index is 21.79 kg/m² as calculated from the following:    Height as of this encounter: 160 cm (63\").    Weight as of this encounter: 55.8 kg (123 lb).          Physical Exam  Vitals reviewed.   Constitutional:       General: She is not in acute distress.     Appearance: She is well-developed.      Comments: Pleasant elderly female, accompanied by her daughter, Vangie.    HENT:      Head: Normocephalic and atraumatic.      Nose:      Right Sinus: No maxillary sinus tenderness or frontal sinus tenderness.      Left Sinus: No maxillary sinus tenderness or frontal sinus tenderness.      Mouth/Throat:      Mouth: No oral lesions.      Pharynx: Uvula midline.      Tonsils: No tonsillar exudate.   Eyes:      Conjunctiva/sclera: Conjunctivae normal.      Pupils: Pupils are equal, round, and reactive to light.   Neck:      Thyroid: No thyroid mass or thyromegaly.      Vascular: No carotid bruit or JVD.      Trachea: Trachea normal. No tracheal deviation.   Cardiovascular:      Rate and Rhythm: Normal rate and regular rhythm.  No extrasystoles are present.     Chest Wall: PMI is not displaced.      Heart sounds: Normal heart sounds. No murmur heard.  Pulmonary:      Effort: Pulmonary effort is normal. No accessory muscle usage or respiratory distress.      Breath sounds: Normal breath sounds. No decreased breath sounds, wheezing, rhonchi or rales.   Abdominal:      General: Bowel sounds are normal. There is no distension.      Palpations: Abdomen is soft.      Tenderness: There is no abdominal tenderness.   Musculoskeletal:      Cervical back: Neck supple.   Lymphadenopathy:      Cervical: No cervical adenopathy.   Skin:     General: Skin is warm and dry.      Findings: No rash.      Nails: There is no clubbing.   Neurological:      Mental Status: She is alert and oriented to person, place, and time.      Cranial Nerves: " No cranial nerve deficit.      Coordination: Coordination normal.   Psychiatric:         Speech: Speech normal.         Behavior: Behavior normal.         Thought Content: Thought content normal.         Judgment: Judgment normal.            Result Review :    CMP    CMP 10/11/21 11/16/21 6/15/22   Glucose 128 (A) 111 (A) 120 (A)   BUN 11 19 18   Creatinine 0.83 0.96 0.88   eGFR Non African Am 65 55    Sodium 137 138 142   Potassium 3.8 4.6 4.4   Chloride 110 106 109   Calcium 9.3 9.1 9.1   Albumin 3.70 4.00 4.10   Total Bilirubin 0.4 0.3 0.5   Alkaline Phosphatase 43 36 (A) 51   AST (SGOT) 19 20 84 (A)   ALT (SGPT) 13 13 60 (A)   (A) Abnormal value            CBC w/diff    CBC w/Diff 10/11/21 11/16/21 6/15/22   WBC 5.37 6.55 5.74   RBC 4.04 3.72 (A) 4.03   Hemoglobin 13.2 11.9 (A) 12.9   Hematocrit 39.0 36.8 38.7   MCV 96.5 98.9 (A) 96.0   MCH 32.7 32.0 32.0   MCHC 33.8 32.3 33.3   RDW 12.2 (A) 12.8 12.6   Platelets 250 218 202   Neutrophil Rel % 73.3 70.4 76.0   Lymphocyte Rel % 17.5 (A) 16.5 (A) 12.5 (A)   Monocyte Rel % 7.6 9.0 8.7   Eosinophil Rel % 0.9 3.2 2.1   Basophil Rel % 0.7 0.9 0.7   (A) Abnormal value            Lipid Panel    Lipid Panel 7/22/21 6/15/22   Total Cholesterol 172    Triglycerides 149    HDL Cholesterol 71 (A)    VLDL Cholesterol 25    LDL Cholesterol  76 61   LDL/HDL Ratio 1.00    (A) Abnormal value            TSH    TSH 7/22/21 10/11/21 6/15/22   TSH 3.030 2.070 2.270                         Assessment and Plan   Diagnoses and all orders for this visit:    1. Unexplained weight loss (Primary)  -     CT Abdomen Pelvis With Contrast; Future  -     CBC Auto Differential; Future  -     Comprehensive Metabolic Panel; Future    2. Seizure disorder (HCC)  -     phenytoin ER (DILANTIN) 100 MG capsule; Take 3 capsules by mouth Every Night.  Dispense: 270 capsule; Refill: 3    3. Vitamin D deficiency  -     vitamin D (ERGOCALCIFEROL) 1.25 MG (54559 UT) capsule capsule; Take 1 capsule by mouth  Every 7 (Seven) Days.  Dispense: 13 capsule; Refill: 3  -     Vitamin D 25 Hydroxy; Future  -     Phenytoin Level, Total; Future    4. Mixed hyperlipidemia  -     simvastatin (ZOCOR) 40 MG tablet; Take 1 tablet by mouth every night at bedtime.  Dispense: 90 tablet; Refill: 3  -     Comprehensive Metabolic Panel; Future  -     Lipid Panel; Future    5. Elevated LFTs  -     CT Abdomen Pelvis With Contrast; Future  -     Comprehensive Metabolic Panel; Future    6. Essential hypertension  -     Comprehensive Metabolic Panel; Future    7. Paroxysmal atrial fibrillation (HCC)  -     Magnesium; Future  -     TSH; Future    8. Ischemic cardiomyopathy  -     BNP; Future    9. Chronic systolic congestive heart failure (CMS/HCC) - EF 30%  -     BNP; Future    10. Paroxysmal ventricular tachycardia (HCC)  -     Comprehensive Metabolic Panel; Future  -     Magnesium; Future    11. Iron deficiency anemia secondary to inadequate dietary iron intake  -     CBC Auto Differential; Future  -     Ferritin; Future  -     Iron Profile; Future    12. Dietary vitamin B12 deficiency anemia  -     CBC Auto Differential; Future  -     Vitamin B12; Future    13. Recurrent major depressive disorder, in partial remission (HCC)    14. Age-related osteoporosis without current pathological fracture  -     Vitamin D 25 Hydroxy; Future    15. Cognitive impairment - mmse 22/30    16. Panlobular emphysema (HCC)  -     CBC Auto Differential; Future    17. Labile hypertension  -     Comprehensive Metabolic Panel; Future    18. At risk for falls    Other orders  -     amLODIPine (NORVASC) 5 MG tablet; Take 1 tablet by mouth 2 (Two) Times a Day.  Dispense: 180 tablet; Refill: 3  -     olmesartan (BENICAR) 40 MG tablet; Take 1 tablet by mouth Every Night.  Dispense: 90 tablet; Refill: 3  -     sotalol (BETAPACE) 80 MG tablet; Take 1 tablet by mouth 2 (Two) Times a Day.  Dispense: 180 tablet; Refill: 3  -     cloNIDine (CATAPRES) 0.1 MG tablet; Take 1 tablet  by mouth 2 (Two) Times a Day.  Dispense: 60 tablet; Refill: 11           I spent 35 minutes caring for Neisha on this date of service. This time includes time spent by me in the following activities:preparing for the visit, reviewing tests, obtaining and/or reviewing a separately obtained history, performing a medically appropriate examination and/or evaluation , counseling and educating the patient/family/caregiver, ordering medications, tests, or procedures, documenting information in the medical record and care coordination with her daughter      To follow up on the elevated liver enzymes in this patient with unexplained weight loss, we will place order for CT scan of the abdomen/pelvis. We will repeat nonfasting CMP in one month to follow up on elevated liver enzymes.    BP lower than necessary today.   We will decrease her dose of clonidine 0.1 mg from 2 tablets b.i.d. to 1 tablet b.i.d. If she starts having elevation of BP in the evenings, she can try taking one in the morning and 1 1/2 -2 tablets at night.  Otherwise, continue other BP medications and monitoring.     Iron levels improved.  Decrease the oral iron from 3 times weekly to 2 times weekly.     Continue simvastatin.  Cholesterol improved and at goal.      Continue Effexor for STANLEY/depression.       Continue Dilantin and Keppra for history of seizure disorder, stable.     Continue Protonix for GERD.     I encouraged patient in compliance with home exercises to help with conditioning.          Return in 4 months for routine follow up with fasting labs one week prior or sooner if needed.      Follow Up   Return in about 4 months (around 10/22/2022).  Patient was given instructions and counseling regarding her condition or for health maintenance advice. Please see specific information pulled into the AVS if appropriate.

## 2022-06-23 PROBLEM — I48.91 ATRIAL FIBRILLATION: Chronic | Status: ACTIVE | Noted: 2021-07-16

## 2022-06-23 RX ORDER — IRON POLYSACCHARIDE COMPLEX 150 MG
150 CAPSULE ORAL 2 TIMES WEEKLY
Qty: 36 CAPSULE | Refills: 3 | COMMUNITY
Start: 2022-06-23

## 2022-06-23 RX ORDER — CLONIDINE HYDROCHLORIDE 0.1 MG/1
0.1 TABLET ORAL 2 TIMES DAILY
Qty: 60 TABLET | Refills: 11 | Status: SHIPPED | OUTPATIENT
Start: 2022-06-23 | End: 2022-11-07

## 2022-07-27 RX ORDER — NITROFURANTOIN 25; 75 MG/1; MG/1
CAPSULE ORAL
Qty: 30 CAPSULE | Refills: 11 | Status: SHIPPED | OUTPATIENT
Start: 2022-07-27

## 2022-08-24 RX ORDER — PREDNISONE 20 MG/1
20 TABLET ORAL EVERY MORNING
Qty: 7 TABLET | Refills: 0 | Status: SHIPPED | OUTPATIENT
Start: 2022-08-24 | End: 2022-08-31

## 2022-10-21 DIAGNOSIS — K21.9 GASTROESOPHAGEAL REFLUX DISEASE WITHOUT ESOPHAGITIS: ICD-10-CM

## 2022-10-21 RX ORDER — PANTOPRAZOLE SODIUM 40 MG/1
40 TABLET, DELAYED RELEASE ORAL 2 TIMES DAILY
Qty: 60 TABLET | Refills: 11 | Status: SHIPPED | OUTPATIENT
Start: 2022-10-21

## 2022-10-26 ENCOUNTER — LAB (OUTPATIENT)
Dept: LAB | Facility: OTHER | Age: 87
End: 2022-10-26

## 2022-10-26 DIAGNOSIS — M81.0 AGE-RELATED OSTEOPOROSIS WITHOUT CURRENT PATHOLOGICAL FRACTURE: Chronic | ICD-10-CM

## 2022-10-26 DIAGNOSIS — D51.8 DIETARY VITAMIN B12 DEFICIENCY ANEMIA: Chronic | ICD-10-CM

## 2022-10-26 DIAGNOSIS — D50.8 IRON DEFICIENCY ANEMIA SECONDARY TO INADEQUATE DIETARY IRON INTAKE: Chronic | ICD-10-CM

## 2022-10-26 DIAGNOSIS — I50.22 CHRONIC SYSTOLIC CONGESTIVE HEART FAILURE: Chronic | ICD-10-CM

## 2022-10-26 DIAGNOSIS — E55.9 VITAMIN D DEFICIENCY: Chronic | ICD-10-CM

## 2022-10-26 DIAGNOSIS — E78.2 MIXED HYPERLIPIDEMIA: Chronic | ICD-10-CM

## 2022-10-26 DIAGNOSIS — J43.1 PANLOBULAR EMPHYSEMA: Chronic | ICD-10-CM

## 2022-10-26 DIAGNOSIS — R79.89 ELEVATED LFTS: ICD-10-CM

## 2022-10-26 DIAGNOSIS — I47.29 PAROXYSMAL VENTRICULAR TACHYCARDIA: Chronic | ICD-10-CM

## 2022-10-26 DIAGNOSIS — I48.0 PAROXYSMAL ATRIAL FIBRILLATION: Chronic | ICD-10-CM

## 2022-10-26 DIAGNOSIS — R09.89 LABILE HYPERTENSION: Chronic | ICD-10-CM

## 2022-10-26 DIAGNOSIS — R63.4 UNEXPLAINED WEIGHT LOSS: ICD-10-CM

## 2022-10-26 DIAGNOSIS — I10 ESSENTIAL HYPERTENSION: Chronic | ICD-10-CM

## 2022-10-26 DIAGNOSIS — I25.5 ISCHEMIC CARDIOMYOPATHY: Chronic | ICD-10-CM

## 2022-10-26 LAB
ALBUMIN SERPL-MCNC: 4 G/DL (ref 3.5–5)
ALBUMIN/GLOB SERPL: 1.3 G/DL (ref 1.1–1.8)
ALP SERPL-CCNC: 55 U/L (ref 38–126)
ALT SERPL W P-5'-P-CCNC: 68 U/L
ANION GAP SERPL CALCULATED.3IONS-SCNC: 6 MMOL/L (ref 5–15)
AST SERPL-CCNC: 103 U/L (ref 14–36)
BASOPHILS # BLD AUTO: 0.05 10*3/MM3 (ref 0–0.2)
BASOPHILS NFR BLD AUTO: 1.2 % (ref 0–1.5)
BILIRUB SERPL-MCNC: 0.7 MG/DL (ref 0.2–1.3)
BNP SERPL-MCNC: 121 PG/ML (ref 0–100)
BUN SERPL-MCNC: 17 MG/DL (ref 7–23)
BUN/CREAT SERPL: 18.9 (ref 7–25)
CALCIUM SPEC-SCNC: 9.1 MG/DL (ref 8.4–10.2)
CHLORIDE SERPL-SCNC: 107 MMOL/L (ref 101–112)
CHOLEST SERPL-MCNC: 174 MG/DL (ref 150–200)
CO2 SERPL-SCNC: 24 MMOL/L (ref 22–30)
CREAT SERPL-MCNC: 0.9 MG/DL (ref 0.52–1.04)
DEPRECATED RDW RBC AUTO: 43.3 FL (ref 37–54)
EGFRCR SERPLBLD CKD-EPI 2021: 62 ML/MIN/1.73
EOSINOPHIL # BLD AUTO: 0.1 10*3/MM3 (ref 0–0.4)
EOSINOPHIL NFR BLD AUTO: 2.4 % (ref 0.3–6.2)
ERYTHROCYTE [DISTWIDTH] IN BLOOD BY AUTOMATED COUNT: 12.8 % (ref 12.3–15.4)
GLOBULIN UR ELPH-MCNC: 3 GM/DL (ref 2.3–3.5)
GLUCOSE SERPL-MCNC: 121 MG/DL (ref 70–99)
HCT VFR BLD AUTO: 37 % (ref 34–46.6)
HDLC SERPL-MCNC: 88 MG/DL (ref 40–59)
HGB BLD-MCNC: 12.1 G/DL (ref 12–15.9)
LDLC SERPL CALC-MCNC: 60 MG/DL
LDLC/HDLC SERPL: 0.62 {RATIO} (ref 0–3.22)
LYMPHOCYTES # BLD AUTO: 0.68 10*3/MM3 (ref 0.7–3.1)
LYMPHOCYTES NFR BLD AUTO: 16.5 % (ref 19.6–45.3)
MAGNESIUM SERPL-MCNC: 1.8 MG/DL (ref 1.6–2.3)
MCH RBC QN AUTO: 31.7 PG (ref 26.6–33)
MCHC RBC AUTO-ENTMCNC: 32.7 G/DL (ref 31.5–35.7)
MCV RBC AUTO: 96.9 FL (ref 79–97)
MONOCYTES # BLD AUTO: 0.44 10*3/MM3 (ref 0.1–0.9)
MONOCYTES NFR BLD AUTO: 10.7 % (ref 5–12)
NEUTROPHILS NFR BLD AUTO: 2.86 10*3/MM3 (ref 1.7–7)
NEUTROPHILS NFR BLD AUTO: 69.2 % (ref 42.7–76)
PLATELET # BLD AUTO: 205 10*3/MM3 (ref 140–450)
PMV BLD AUTO: 9 FL (ref 6–12)
POTASSIUM SERPL-SCNC: 4.1 MMOL/L (ref 3.4–5)
PROT SERPL-MCNC: 7 G/DL (ref 6.3–8.6)
RBC # BLD AUTO: 3.82 10*6/MM3 (ref 3.77–5.28)
SODIUM SERPL-SCNC: 137 MMOL/L (ref 137–145)
TRIGL SERPL-MCNC: 158 MG/DL
VLDLC SERPL-MCNC: 26 MG/DL (ref 5–40)
WBC NRBC COR # BLD: 4.13 10*3/MM3 (ref 3.4–10.8)

## 2022-10-26 PROCEDURE — 84443 ASSAY THYROID STIM HORMONE: CPT | Performed by: INTERNAL MEDICINE

## 2022-10-26 PROCEDURE — 83540 ASSAY OF IRON: CPT | Performed by: INTERNAL MEDICINE

## 2022-10-26 PROCEDURE — 82728 ASSAY OF FERRITIN: CPT | Performed by: INTERNAL MEDICINE

## 2022-10-26 PROCEDURE — 83880 ASSAY OF NATRIURETIC PEPTIDE: CPT | Performed by: INTERNAL MEDICINE

## 2022-10-26 PROCEDURE — 85025 COMPLETE CBC W/AUTO DIFF WBC: CPT | Performed by: INTERNAL MEDICINE

## 2022-10-26 PROCEDURE — 82607 VITAMIN B-12: CPT | Performed by: INTERNAL MEDICINE

## 2022-10-26 PROCEDURE — 80053 COMPREHEN METABOLIC PANEL: CPT | Performed by: INTERNAL MEDICINE

## 2022-10-26 PROCEDURE — 80185 ASSAY OF PHENYTOIN TOTAL: CPT | Performed by: INTERNAL MEDICINE

## 2022-10-26 PROCEDURE — 84466 ASSAY OF TRANSFERRIN: CPT | Performed by: INTERNAL MEDICINE

## 2022-10-26 PROCEDURE — 83735 ASSAY OF MAGNESIUM: CPT | Performed by: INTERNAL MEDICINE

## 2022-10-26 PROCEDURE — 36415 COLL VENOUS BLD VENIPUNCTURE: CPT | Performed by: INTERNAL MEDICINE

## 2022-10-26 PROCEDURE — 80061 LIPID PANEL: CPT | Performed by: INTERNAL MEDICINE

## 2022-10-26 PROCEDURE — 82306 VITAMIN D 25 HYDROXY: CPT | Performed by: INTERNAL MEDICINE

## 2022-10-27 DIAGNOSIS — G40.909 SEIZURE DISORDER: Chronic | ICD-10-CM

## 2022-10-27 LAB
25(OH)D3 SERPL-MCNC: 63.2 NG/ML (ref 30–100)
FERRITIN SERPL-MCNC: 167 NG/ML (ref 13–150)
IRON 24H UR-MRATE: 140 MCG/DL (ref 37–145)
IRON SATN MFR SERPL: 40 % (ref 20–50)
PHENYTOIN SERPL-MCNC: 21.6 MCG/ML (ref 10–20)
TIBC SERPL-MCNC: 352 MCG/DL (ref 298–536)
TRANSFERRIN SERPL-MCNC: 236 MG/DL (ref 200–360)
TSH SERPL DL<=0.05 MIU/L-ACNC: 2.31 UIU/ML (ref 0.27–4.2)
VIT B12 BLD-MCNC: 957 PG/ML (ref 211–946)

## 2022-10-27 RX ORDER — PHENYTOIN SODIUM 100 MG/1
CAPSULE, EXTENDED RELEASE ORAL
Qty: 221 CAPSULE | Refills: 3 | Status: SHIPPED | OUTPATIENT
Start: 2022-10-27 | End: 2022-10-31 | Stop reason: SDUPTHER

## 2022-10-31 ENCOUNTER — OFFICE VISIT (OUTPATIENT)
Dept: FAMILY MEDICINE CLINIC | Facility: CLINIC | Age: 87
End: 2022-10-31

## 2022-10-31 VITALS
HEIGHT: 63 IN | BODY MASS INDEX: 21.97 KG/M2 | SYSTOLIC BLOOD PRESSURE: 122 MMHG | WEIGHT: 124 LBS | HEART RATE: 56 BPM | DIASTOLIC BLOOD PRESSURE: 58 MMHG | TEMPERATURE: 98.7 F

## 2022-10-31 DIAGNOSIS — N28.9 RENAL LESION: Chronic | ICD-10-CM

## 2022-10-31 DIAGNOSIS — M81.0 AGE-RELATED OSTEOPOROSIS WITHOUT CURRENT PATHOLOGICAL FRACTURE: Chronic | ICD-10-CM

## 2022-10-31 DIAGNOSIS — E55.9 VITAMIN D DEFICIENCY: Chronic | ICD-10-CM

## 2022-10-31 DIAGNOSIS — D50.8 IRON DEFICIENCY ANEMIA SECONDARY TO INADEQUATE DIETARY IRON INTAKE: Chronic | ICD-10-CM

## 2022-10-31 DIAGNOSIS — D51.8 DIETARY VITAMIN B12 DEFICIENCY ANEMIA: Chronic | ICD-10-CM

## 2022-10-31 DIAGNOSIS — Z23 NEED FOR INFLUENZA VACCINATION: ICD-10-CM

## 2022-10-31 DIAGNOSIS — I50.22 CHRONIC SYSTOLIC CONGESTIVE HEART FAILURE: Chronic | ICD-10-CM

## 2022-10-31 DIAGNOSIS — G40.909 SEIZURE DISORDER: Chronic | ICD-10-CM

## 2022-10-31 DIAGNOSIS — Z00.00 MEDICARE ANNUAL WELLNESS VISIT, SUBSEQUENT: Primary | ICD-10-CM

## 2022-10-31 DIAGNOSIS — I10 ESSENTIAL HYPERTENSION: Chronic | ICD-10-CM

## 2022-10-31 DIAGNOSIS — I25.5 ISCHEMIC CARDIOMYOPATHY: Chronic | ICD-10-CM

## 2022-10-31 DIAGNOSIS — R79.89 ELEVATED LFTS: ICD-10-CM

## 2022-10-31 DIAGNOSIS — E78.2 MIXED HYPERLIPIDEMIA: Chronic | ICD-10-CM

## 2022-10-31 DIAGNOSIS — J44.1 COPD WITH EXACERBATION: ICD-10-CM

## 2022-10-31 DIAGNOSIS — Z23 NEED FOR COVID-19 VACCINE: ICD-10-CM

## 2022-10-31 PROCEDURE — 1159F MED LIST DOCD IN RCRD: CPT | Performed by: INTERNAL MEDICINE

## 2022-10-31 PROCEDURE — 1160F RVW MEDS BY RX/DR IN RCRD: CPT | Performed by: INTERNAL MEDICINE

## 2022-10-31 PROCEDURE — G0008 ADMIN INFLUENZA VIRUS VAC: HCPCS | Performed by: INTERNAL MEDICINE

## 2022-10-31 PROCEDURE — 1170F FXNL STATUS ASSESSED: CPT | Performed by: INTERNAL MEDICINE

## 2022-10-31 PROCEDURE — 1126F AMNT PAIN NOTED NONE PRSNT: CPT | Performed by: INTERNAL MEDICINE

## 2022-10-31 PROCEDURE — 99214 OFFICE O/P EST MOD 30 MIN: CPT | Performed by: INTERNAL MEDICINE

## 2022-10-31 PROCEDURE — G0439 PPPS, SUBSEQ VISIT: HCPCS | Performed by: INTERNAL MEDICINE

## 2022-10-31 PROCEDURE — 90662 IIV NO PRSV INCREASED AG IM: CPT | Performed by: INTERNAL MEDICINE

## 2022-10-31 RX ORDER — ERGOCALCIFEROL 1.25 MG/1
50000 CAPSULE ORAL
Qty: 13 CAPSULE | Refills: 3 | Status: SHIPPED | OUTPATIENT
Start: 2022-10-31 | End: 2023-03-09

## 2022-10-31 RX ORDER — PREDNISONE 20 MG/1
20 TABLET ORAL DAILY
Qty: 7 TABLET | Refills: 0 | Status: SHIPPED | OUTPATIENT
Start: 2022-10-31 | End: 2023-03-09

## 2022-10-31 RX ORDER — PHENYTOIN SODIUM 100 MG/1
CAPSULE, EXTENDED RELEASE ORAL
Qty: 221 CAPSULE | Refills: 3 | Status: SHIPPED | OUTPATIENT
Start: 2022-10-31

## 2022-10-31 NOTE — PATIENT INSTRUCTIONS
Medicare Wellness  Personal Prevention Plan of Service     Date of Office Visit:    Encounter Provider:  Keith Causey MD  Place of Service:  [facility] [place]  Patient Name: Neisha Duran  :  1934    As part of the Medicare Wellness portion of your visit today, we are providing you with this personalized preventive plan of services (PPPS). This plan is based upon recommendations of the [place] Preventive Services Task Force (USPSTF) and the Advisory Committee on Immunization Practices (ACIP).    This lists the preventive care services that should be considered, and provides dates of when you are due. Items listed as completed are up-to-date and do not require any further intervention.    Health Maintenance   Topic Date Due    TDAP/TD VACCINES (1 - Tdap) Never done    ZOSTER VACCINE (1 of 2) Never done    DXA SCAN  2021    COVID-19 Vaccine (4 - Booster for Moderna series) 2021    ANNUAL WELLNESS VISIT  2022    INFLUENZA VACCINE  2022    LIPID PANEL  10/26/2023    Pneumococcal Vaccine 65+  Completed       No orders of the defined types were placed in this encounter.      Return in about 4 months (around 2023).

## 2022-10-31 NOTE — PROGRESS NOTES
The ABCs of the Annual Wellness Visit  Subsequent Medicare Wellness Visit    Chief Complaint   Patient presents with   • Follow-up     4 month   • Medicare Wellness-subsequent   • Cough     Started today   • Immunizations     Flu vaccine and tolerated procedure well      Subjective    History of Present Illness:  Neisha Duran is a 87 y.o. female who presents for a Subsequent Medicare Wellness Visit.    The following portions of the patient's history were reviewed and   updated as appropriate: allergies, current medications, past family history, past medical history, past social history, past surgical history and problem list.    Compared to one year ago, the patient feels her physical   health is better.    Compared to one year ago, the patient feels her mental   health is better.    Recent Hospitalizations:  She was not admitted to the hospital during the last year.       Current Medical Providers:  Patient Care Team:  Keith Causey MD as PCP - General (Internal Medicine)    Outpatient Medications Prior to Visit   Medication Sig Dispense Refill   • amLODIPine (NORVASC) 5 MG tablet Take 1 tablet by mouth 2 (Two) Times a Day. 180 tablet 3   • calcium carbonate 1250 (500 CA) MG/5ML Take  by mouth 2 (Two) Times a Day.     • cloNIDine (CATAPRES) 0.1 MG tablet Take 1 tablet by mouth 2 (Two) Times a Day. 60 tablet 11   • clopidogrel (PLAVIX) 75 MG tablet TAKE 1 TABLET BY MOUTH DAILY. 30 tablet 11   • Cyanocobalamin 5000 MCG capsule Take  by mouth. 1/4 tab Weekly     • fluticasone (FLONASE) 50 MCG/ACT nasal spray 1 spray into each nostril 2 (Two) Times a Day. Administer 1 spray in each nostril twice daily. 1 bottle 11   • hydrALAZINE (APRESOLINE) 50 MG tablet TAKE 1 TABLET BY MOUTH EVERY MORNING AND TAKE 1 TABLET AT 2PM. MAY ALSO USE AN ADDITIONAL TABLET AS NEEDED FOR ELEVATED BLOOD PRESSURE. 270 tablet 3   • iron polysaccharides (NIFEREX) 150 MG capsule Take 1 capsule by mouth 2 (Two) Times a Week. 36 capsule 3   •  levETIRAcetam XR (KEPPRA XR) 500 MG 24 hr tablet Take 6 tablets every evening 180 tablet 11   • nitrofurantoin, macrocrystal-monohydrate, (MACROBID) 100 MG capsule TAKE 1 CAPSULE BY MOUTH DAILY **REPLACES TRIMETHOPRIM** 30 capsule 11   • olmesartan (BENICAR) 40 MG tablet Take 1 tablet by mouth Every Night. 90 tablet 3   • pantoprazole (PROTONIX) 40 MG EC tablet TAKE 1 TABLET BY MOUTH 2 (TWO) TIMES A DAY. 60 tablet 11   • Probiotic Product (PROBIOTIC DAILY PO) Take  by mouth Daily.     • simvastatin (ZOCOR) 40 MG tablet Take 1 tablet by mouth every night at bedtime. 90 tablet 3   • sotalol (BETAPACE) 80 MG tablet Take 1 tablet by mouth 2 (Two) Times a Day. 180 tablet 3   • venlafaxine XR (EFFEXOR-XR) 150 MG 24 hr capsule Take 1 capsule by mouth Daily. 90 capsule 3   • Ergocalciferol (VITAMIN D2 PO) Take 50,000 Units by mouth Every 30 (Thirty) Days.     • phenytoin ER (DILANTIN) 100 MG capsule 3 capsules p.o. nightly on Monday, Wednesday, Friday.  2 capsules p.o. nightly on the other 4 nights of the week. (Patient taking differently: 2 capsules p.o. nightly on Monday, Wednesday, Friday.  3 capsules p.o. nightly on the other 4 nights of the week.) 221 capsule 3   • vitamin D (ERGOCALCIFEROL) 1.25 MG (24119 UT) capsule capsule Take 1 capsule by mouth Every 7 (Seven) Days. 13 capsule 3   • Zoster Vac Recomb Adjuvanted 50 MCG/0.5ML reconstituted suspension Inject 0.5 mL into the appropriate muscle as directed by prescriber Every 2 (Two) Months. 1 each 1     Facility-Administered Medications Prior to Visit   Medication Dose Route Frequency Provider Last Rate Last Admin   • denosumab (PROLIA) syringe 60 mg  60 mg Subcutaneous Q6 Months Keith Causey MD   60 mg at 12/11/17 1113       No opioid medication identified on active medication list. I have reviewed chart for other potential  high risk medication/s and harmful drug interactions in the elderly.          Aspirin is not on active medication list.  Aspirin use is  "contraindicated for this patient due to: current use of clopidogrel.  .    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[facility])   • Mild dehydration   • Iron deficiency anemia   • Mixed hyperlipidemia   • Homonymous hemianopia - left   • Essential hypertension   • Dysuria   • Disorder of cardiovascular system   • Dietary vitamin B12 deficiency anemia   • Diarrhea   • Delirium due to known physiological condition   • Chronic systolic congestive heart failure (CMS/HCC) - EF 30%   • Closed fracture of ankle   • Chronic obstructive lung disease ([facility])   • Carotid artery stenosis   • Cardiomyopathy ([facility])   • Allergic rhinitis   • Acute otitis media, left   • Acute exacerbation of chronic obstructive airways disease ([facility])   • Seizure disorder ([facility])   • Recurrent major depressive disorder, in partial remission ([facility])   • History of sustained ventricular tachycardia - S/P ICD placement   • Fall in elderly patient   • Labile hypertension   • Recurrent UTI (urinary tract infection)   • Asthma   • Atrial fibrillation ([facility])   • Automatic implantable cardioverter-defibrillator in situ   • History of aspiration pneumonia   • Cognitive impairment - mmse 22/30   • Renal lesion - Right, exophytic. Noted on CT 2022.  Conservative observation (U/S) due to age and life expectancy     Advance Care Planning  Advance Directive is not on file.  ACP discussion was held with the patient during this visit. Patient does not have an advance directive, information provided.          Objective    Vitals:    10/31/22 1130   BP: 122/58   Pulse: 56   Temp: 98.7 °F (37.1 °C)   Weight: 56.2 kg (124 lb)   Height: 160 cm (63\")   PainSc: 0-No pain     Estimated body mass index is 21.97 kg/m² as calculated from " "the following:    Height as of this encounter: 160 cm (63\").    Weight as of this encounter: 56.2 kg (124 lb).    BMI is within normal parameters. No other follow-up for BMI required.      Does the patient have evidence of cognitive impairment? Yes    Physical Exam  Lab Results   Component Value Date    TRIG 158 (H) 10/26/2022    HDL 88 (H) 10/26/2022    LDL 60 10/26/2022    VLDL 26 10/26/2022            HEALTH RISK ASSESSMENT    Smoking Status:  Social History     Tobacco Use   Smoking Status Former   Smokeless Tobacco Never     Alcohol Consumption:  Social History     Substance and Sexual Activity   Alcohol Use No     Fall Risk Screen:    STEADI Fall Risk Assessment was completed, and patient is at HIGH risk for falls. Assessment completed on:10/31/2022    Depression Screening:  PHQ-2/PHQ-9 Depression Screening 10/31/2022   Retired PHQ-9 Total Score -   Retired Total Score -   Little Interest or Pleasure in Doing Things 0-->not at all   Feeling Down, Depressed or Hopeless 0-->not at all   PHQ-9: Brief Depression Severity Measure Score 0       Health Habits and Functional and Cognitive Screening:  Functional & Cognitive Status 10/31/2022   Do you have difficulty preparing food and eating? Yes   Do you have difficulty bathing yourself, getting dressed or grooming yourself? Yes   Do you have difficulty using the toilet? No   Do you have difficulty moving around from place to place? Yes   Do you have trouble with steps or getting out of a bed or a chair? Yes   Current Diet Well Balanced Diet   Dental Exam Not up to date   Eye Exam Not up to date   Exercise (times per week) 0 times per week   Current Exercises Include No Regular Exercise   Current Exercise Activities Include -   Do you need help using the phone?  Yes   Are you deaf or do you have serious difficulty hearing?  No   Do you need help with transportation? Yes   Do you need help shopping? Yes   Do you need help preparing meals?  Yes   Do you need help with " housework?  Yes   Do you need help with laundry? Yes   Do you need help taking your medications? Yes   Do you need help managing money? No   Do you ever drive or ride in a car without wearing a seat belt? No   Have you felt unusual stress, anger or loneliness in the last month? No   Who do you live with? Alone   If you need help, do you have trouble finding someone available to you? No   Have you been bothered in the last four weeks by sexual problems? No   Do you have difficulty concentrating, remembering or making decisions? Yes       Age-appropriate Screening Schedule:  Refer to the list below for future screening recommendations based on patient's age, sex and/or medical conditions. Orders for these recommended tests are listed in the plan section. The patient has been provided with a written plan.    Health Maintenance   Topic Date Due   • TDAP/TD VACCINES (1 - Tdap) Never done   • ZOSTER VACCINE (1 of 2) Never done   • DXA SCAN  08/06/2021   • LIPID PANEL  10/26/2023   • INFLUENZA VACCINE  Completed              Assessment & Plan   CMS Preventative Services Quick Reference  Risk Factors Identified During Encounter  Fall Risk-High or Moderate  Immunizations Discussed/Encouraged (specific Immunizations; Tdap, Influenza, Shingrix and COVID19  Inactivity/Sedentary  Osteoporosis screening     After informed verbal consent, patient is given high dose flu vaccine while in the office today.  An order is placed for patient to schedule overdue DEXA.  Recommended updated COVID vaccine in about 4 weeks, which will be 90 days from most recent positive COVID test.     The above risks/problems have been discussed with the patient.  Follow up actions/plans if indicated are seen below in the Assessment/Plan Section.  Pertinent information has been shared with the patient in the After Visit Summary.    Diagnoses and all orders for this visit:    1. Medicare annual wellness visit, subsequent (Primary)    2. Age-related  osteoporosis without current pathological fracture  -     DEXA Bone Density Axial; Future  -     Comprehensive Metabolic Panel; Future  -     Vitamin D,25-Hydroxy; Future    3. Need for influenza vaccination    4. Need for COVID-19 vaccine    5. COPD with exacerbation (HCC)  -     CBC Auto Differential; Future    6. Seizure disorder (HCC)  -     phenytoin ER (DILANTIN) 100 MG capsule; 2 capsules p.o. nightly on Monday, Wednesday, Friday.  3 capsules p.o. nightly on the other 4 nights of the week.  Dispense: 221 capsule; Refill: 3  -     CBC Auto Differential; Future  -     Comprehensive Metabolic Panel; Future  -     Phenytoin Level, Total; Future    7. Essential hypertension  -     Comprehensive Metabolic Panel; Future    8. Elevated LFTs  -     Comprehensive Metabolic Panel; Future    9. Renal lesion - Right, exophytic. Noted on CT 2022. Conservative observation (U/S) due to age and life expectancy    10. Mixed hyperlipidemia  -     LDL Cholesterol, Direct; Future    11. Chronic systolic congestive heart failure (CMS/HCC) - EF 30%  -     BNP; Future    12. Ischemic cardiomyopathy    13. Iron deficiency anemia secondary to inadequate dietary iron intake  -     CBC Auto Differential; Future  -     Ferritin; Future  -     Iron Profile; Future    14. Dietary vitamin B12 deficiency anemia  -     CBC Auto Differential; Future  -     Folate; Future  -     Vitamin B12; Future    15. Vitamin D deficiency  -     vitamin D (ERGOCALCIFEROL) 1.25 MG (24639 UT) capsule capsule; Take 1 capsule by mouth Every 14 (Fourteen) Days.  Dispense: 13 capsule; Refill: 3    Other orders  -     Fluzone High-Dose 65+yrs  -     predniSONE (DELTASONE) 20 MG tablet; Take 1 tablet by mouth Daily.  Dispense: 7 tablet; Refill: 0        Follow Up:   Return in about 4 months (around 2/28/2023).     An After Visit Summary and PPPS were made available to the patient.    {Optional Chart Navigation Links Wrapup  Review (Popup)  Advance Care Planning   Labs  CC  Problem List  Visit Diagnosis  Medications  Result Review  Imaging  [facility]  [facility]  [facility]  Snap[employer]ot  Encounters  Notes  Media  Procedures :23}      I spent 7 minutes caring for Neisha on this date of service. This time includes time spent by me in the following activities:preparing for the visit, performing a medically appropriate examination and/or evaluation , counseling and educating the patient/family/caregiver, ordering medications, tests, or procedures, documenting information in the medical record and care coordination with her daughter

## 2022-10-31 NOTE — PROGRESS NOTES
Chief Complaint  Follow-up (4 month), Medicare Wellness-subsequent, Cough (Started today), and Immunizations (Flu vaccine and tolerated procedure well)    Subjective        History of Present Illness     Neisha Duran presents to the clinic accompanied by her daughter, Vangie, for 4-month follow-up of multiple complex issues including extensive vascular disease, hyperlipidemia, labile, severe hypertension, systolic CHF/cardiomyopathy, carotid artery stenosis, COPD, and iron and vitamin B12 deficiency anemia among many other issues.  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  Patient also completes subsequent Medicare wellness visit while in the office today.  We reviewed health maintenance issues and made recommendations as noted below in plan.      Four months ago, we ordered CT scan of the abdomen/pelvis to address the elevated liver enzymes and unexplained weight loss.  CT of abdomen and pelvis dated 06/30/2022 revealed diffuse fatty changes of the liver as well as right renal lesion measuring 1.3 cm and right adrenal prominence measuring 2.1 x 1.5 cm in cross-section and left adrenal prominence measuring 1.4 x 1.1 cm.  Additional evaluation is not anticipated at this time, although, we will discuss renal ultrasound to follow up in four months.  Liver enzymes trending higher with  increased from 84 and ALT 68 increased from 60.  She tested positive for COVID a couple of months ago.  No further weight loss with her weight up 1 pound in the past four months.  Platelets and bilirubin normal.       She denies recent falls, but has an unsteady gait and is at increased fall risk.  She is physically deconditioned.  We discussed ways to improve conditioning by walking inside the house several times daily.  I continue to urge her to use a cane or walker, especially when walking outdoors to help reduce fall risk.     "    Patient is overdue DEXA and agrees to schedule.  DEXA dated 08/2019 revealed improvement from osteoporosis to osteopenia of the hip with normal density of the lumbar spine.  Vitamin D a little higher than necessary with weekly vitamin D 50,000 units, for which we will have her decrease to bi-weekly.      Patient tested positive for COVID late August, for which we treated with Paxlovid. She had onset of nonproductive cough this morning.  She feels breathing is at her baseline.   She has rhonchi and wheezing on exam today consistent with mild COPD exacerbation.     Dilantin level was slightly above goal, for which we notified patient to decrease her dilantin 100 mg dose to 3 tablets on Monday, Wednesday and Friday with 2 on remaining days of the week.  There has been no seizure activity for years.    With her visit four months ago, BP was running low, for which we decreased her dose of clonidine 0.1 mg from 2 tablets b.i.d. to 1 tablet b.i.d.  BP and HR at goal in the office today.      The patient's relevant past medical, surgical, and social history was reviewed in Epic.   Lab results are reviewed with the patient today. CBC unremarkable.  BNP nice and low at 121.  Fasting glucose 121.  Liver enzymes continue to trend higher as noted above.  Magnesium, vitamin D and vitamin B-12 at goal with oral supplements.  Cholesterol at goal with dietary efforts.  She has an excellent HDL 88.   Iron improved with oral iron twice weekly.   Magnesium at goal.        Objective   Vital Signs:  /58   Pulse 56   Temp 98.7 °F (37.1 °C)   Ht 160 cm (63\")   Wt 56.2 kg (124 lb)   BMI 21.97 kg/m²   Estimated body mass index is 21.97 kg/m² as calculated from the following:    Height as of this encounter: 160 cm (63\").    Weight as of this encounter: 56.2 kg (124 lb).    BMI is within normal parameters. No other follow-up for BMI required.      Physical Exam  Vitals reviewed.   Constitutional:       General: She is not in " acute distress.     Appearance: She is well-developed.      Comments: Pleasant elderly female, accompanied by her daughter, Vangie.     HENT:      Head: Normocephalic and atraumatic.      Nose:      Right Sinus: No maxillary sinus tenderness or frontal sinus tenderness.      Left Sinus: No maxillary sinus tenderness or frontal sinus tenderness.      Mouth/Throat:      Mouth: No oral lesions.      Pharynx: Uvula midline.      Tonsils: No tonsillar exudate.      Comments: Clear postnasal drainage posterior oropharynx. .   Eyes:      Conjunctiva/sclera: Conjunctivae normal.      Pupils: Pupils are equal, round, and reactive to light.   Neck:      Thyroid: No thyroid mass or thyromegaly.      Vascular: No carotid bruit or JVD.      Trachea: Trachea normal. No tracheal deviation.   Cardiovascular:      Rate and Rhythm: Normal rate and regular rhythm.  No extrasystoles are present.     Chest Wall: PMI is not displaced.      Heart sounds: Normal heart sounds. No murmur heard.  Pulmonary:      Effort: Pulmonary effort is normal. No accessory muscle usage or respiratory distress.      Breath sounds: Normal breath sounds. No decreased breath sounds, wheezing, rhonchi or rales.      Comments: Rhonchi on the right with expiratory wheezes.   Abdominal:      General: Bowel sounds are normal. There is no distension.      Palpations: Abdomen is soft.      Tenderness: There is no abdominal tenderness.   Musculoskeletal:      Cervical back: Neck supple.   Lymphadenopathy:      Cervical: No cervical adenopathy.   Skin:     General: Skin is warm and dry.      Findings: No rash.      Nails: There is no clubbing.   Neurological:      Mental Status: She is alert and oriented to person, place, and time.      Cranial Nerves: No cranial nerve deficit.      Coordination: Coordination normal.   Psychiatric:         Speech: Speech normal.         Behavior: Behavior normal.         Thought Content: Thought content normal.         Judgment:  Judgment normal.            Result Review :    CMP    CMP 11/16/21 6/15/22 10/26/22   Glucose 111 (A) 120 (A) 121 (A)   BUN 19 18 17   Creatinine 0.96 0.88 0.90   eGFR Non African Am 55     Sodium 138 142 137   Potassium 4.6 4.4 4.1   Chloride 106 109 107   Calcium 9.1 9.1 9.1   Albumin 4.00 4.10 4.00   Total Bilirubin 0.3 0.5 0.7   Alkaline Phosphatase 36 (A) 51 55   AST (SGOT) 20 84 (A) 103 (A)   ALT (SGPT) 13 60 (A) 68 (A)   (A) Abnormal value            CBC w/diff    CBC w/Diff 11/16/21 6/15/22 10/26/22   WBC 6.55 5.74 4.13   RBC 3.72 (A) 4.03 3.82   Hemoglobin 11.9 (A) 12.9 12.1   Hematocrit 36.8 38.7 37.0   MCV 98.9 (A) 96.0 96.9   MCH 32.0 32.0 31.7   MCHC 32.3 33.3 32.7   RDW 12.8 12.6 12.8   Platelets 218 202 205   Neutrophil Rel % 70.4 76.0 69.2   Lymphocyte Rel % 16.5 (A) 12.5 (A) 16.5 (A)   Monocyte Rel % 9.0 8.7 10.7   Eosinophil Rel % 3.2 2.1 2.4   Basophil Rel % 0.9 0.7 1.2   (A) Abnormal value            Lipid Panel    Lipid Panel 6/15/22 10/26/22   Total Cholesterol  174   Triglycerides  158 (A)   HDL Cholesterol  88 (A)   VLDL Cholesterol  26   LDL Cholesterol  61 60   LDL/HDL Ratio  0.62   (A) Abnormal value            TSH    TSH 6/15/22 10/26/22   TSH 2.270 2.310                         Assessment and Plan   Diagnoses and all orders for this visit:    1. Medicare annual wellness visit, subsequent (Primary)    2. Age-related osteoporosis without current pathological fracture  -     DEXA Bone Density Axial; Future  -     Comprehensive Metabolic Panel; Future  -     Vitamin D,25-Hydroxy; Future    3. Need for influenza vaccination    4. Need for COVID-19 vaccine    5. COPD with exacerbation (HCC)  -     CBC Auto Differential; Future    6. Seizure disorder (HCC)  -     phenytoin ER (DILANTIN) 100 MG capsule; 2 capsules p.o. nightly on Monday, Wednesday, Friday.  3 capsules p.o. nightly on the other 4 nights of the week.  Dispense: 221 capsule; Refill: 3  -     CBC Auto Differential; Future  -      Comprehensive Metabolic Panel; Future  -     Phenytoin Level, Total; Future    7. Essential hypertension  -     Comprehensive Metabolic Panel; Future    8. Elevated LFTs  -     Comprehensive Metabolic Panel; Future    9. Renal lesion - Right, exophytic. Noted on CT 2022. Conservative observation (U/S) due to age and life expectancy    10. Mixed hyperlipidemia  -     LDL Cholesterol, Direct; Future    11. Chronic systolic congestive heart failure (CMS/HCC) - EF 30%  -     BNP; Future    12. Ischemic cardiomyopathy    13. Iron deficiency anemia secondary to inadequate dietary iron intake  -     CBC Auto Differential; Future  -     Ferritin; Future  -     Iron Profile; Future    14. Dietary vitamin B12 deficiency anemia  -     CBC Auto Differential; Future  -     Folate; Future  -     Vitamin B12; Future    15. Vitamin D deficiency    Other orders  -     Fluzone High-Dose 65+yrs  -     predniSONE (DELTASONE) 20 MG tablet; Take 1 tablet by mouth Daily.  Dispense: 7 tablet; Refill: 0                Patient completes subsequent Medicare wellness visit while in the office today.  After informed verbal consent, patient is given high dose flu vaccine while in the office today.  An order is placed for patient to schedule overdue DEXA.  Recommended updated COVID vaccine in about 4 weeks, which will be 90 days from most recent positive COVID test.       For the new problem of mild COPD exacerbation, a prescription is sent for prednisone 20 mg to take one q.d.  Resume the Trelegy Inhaler one puff daily. Notify us if she starts noticing evidence of bacterial infection including colored sputum and we can add antibiotic treatment.     Continue the Dilantin 2 capsules p.o. nightly on Monday, Wednesday, Friday and 3 capsules p.o. nightly on the other 4 nights of the week.     Liver enzymes continue to trend up gradually.  Platelets remain normal.  Total bilirubin normal and alkaline phosphatase.  We will continue to follow.  She and  her daughter do not desire extensive work-up at this time.  CT imaging failed to reveal any evidence of metastatic liver disease    We will discuss scheduling renal ultrasound to follow up on the exophytic right renal lesion on CT of the abdomen and pelvis 06/30/2022.  I discussed the findings with Dr. Mueller, radiologist who provided reassurance that, even with worst-case scenario, this small lesion would be very unlikely to adversely impact her survival, given her other chronic medical issues and life expectancy.    Continue oral iron twice weekly.  Iron improved.       Decrease vitamin D from  vitamin D  50,000 units weekly to twice monthly.  An order is placed for patient to schedule overdue DEXA.      Return in 4 months for routine follow up with fasting labs one    Scribed for Dr. Causey by Erin Cavazos [facility].     Follow Up   Return in about 4 months (around 2/28/2023).  Patient was given instructions and counseling regarding her condition or for health maintenance advice. Please see specific information pulled into the AVS if appropriate.

## 2022-11-07 RX ORDER — CLONIDINE HYDROCHLORIDE 0.1 MG/1
0.1 TABLET ORAL 2 TIMES DAILY
Qty: 180 TABLET | Refills: 3 | Status: SHIPPED | OUTPATIENT
Start: 2022-11-07

## 2022-12-01 ENCOUNTER — IMMUNIZATION (OUTPATIENT)
Dept: FAMILY MEDICINE CLINIC | Facility: CLINIC | Age: 87
End: 2022-12-01

## 2022-12-01 DIAGNOSIS — Z23 NEED FOR COVID-19 VACCINE: Primary | ICD-10-CM

## 2022-12-01 PROCEDURE — 0004A: CPT | Performed by: INTERNAL MEDICINE

## 2022-12-01 PROCEDURE — 91312 COVID-19 (PFIZER) BIVALENT BOOSTER 12+YRS: CPT | Performed by: INTERNAL MEDICINE

## 2022-12-15 DIAGNOSIS — G40.909 SEIZURE DISORDER: Primary | ICD-10-CM

## 2022-12-15 RX ORDER — LEVETIRACETAM 500 MG/1
TABLET, EXTENDED RELEASE ORAL
Qty: 180 TABLET | Refills: 11 | Status: SHIPPED | OUTPATIENT
Start: 2022-12-15

## 2023-02-17 DIAGNOSIS — I50.22 CHRONIC SYSTOLIC CONGESTIVE HEART FAILURE: ICD-10-CM

## 2023-02-17 RX ORDER — CLOPIDOGREL BISULFATE 75 MG/1
75 TABLET ORAL DAILY
Qty: 30 TABLET | Refills: 11 | Status: SHIPPED | OUTPATIENT
Start: 2023-02-17

## 2023-02-27 ENCOUNTER — LAB (OUTPATIENT)
Dept: LAB | Facility: OTHER | Age: 88
End: 2023-02-27
Payer: MEDICARE

## 2023-02-27 DIAGNOSIS — I10 ESSENTIAL HYPERTENSION: Chronic | ICD-10-CM

## 2023-02-27 DIAGNOSIS — M81.0 AGE-RELATED OSTEOPOROSIS WITHOUT CURRENT PATHOLOGICAL FRACTURE: Chronic | ICD-10-CM

## 2023-02-27 DIAGNOSIS — D50.8 IRON DEFICIENCY ANEMIA SECONDARY TO INADEQUATE DIETARY IRON INTAKE: Chronic | ICD-10-CM

## 2023-02-27 DIAGNOSIS — R79.89 ELEVATED LFTS: ICD-10-CM

## 2023-02-27 DIAGNOSIS — G40.909 SEIZURE DISORDER: Chronic | ICD-10-CM

## 2023-02-27 DIAGNOSIS — J44.1 COPD WITH EXACERBATION: ICD-10-CM

## 2023-02-27 DIAGNOSIS — I50.22 CHRONIC SYSTOLIC CONGESTIVE HEART FAILURE: Chronic | ICD-10-CM

## 2023-02-27 DIAGNOSIS — D51.8 DIETARY VITAMIN B12 DEFICIENCY ANEMIA: Chronic | ICD-10-CM

## 2023-02-27 DIAGNOSIS — E78.2 MIXED HYPERLIPIDEMIA: Chronic | ICD-10-CM

## 2023-02-27 LAB
ALBUMIN SERPL-MCNC: 4 G/DL (ref 3.5–5)
ALBUMIN/GLOB SERPL: 1.1 G/DL (ref 1.1–1.8)
ALP SERPL-CCNC: 57 U/L (ref 38–126)
ALT SERPL W P-5'-P-CCNC: 25 U/L
ANION GAP SERPL CALCULATED.3IONS-SCNC: 7 MMOL/L (ref 5–15)
AST SERPL-CCNC: 52 U/L (ref 14–36)
BASOPHILS # BLD AUTO: 0.04 10*3/MM3 (ref 0–0.2)
BASOPHILS NFR BLD AUTO: 0.8 % (ref 0–1.5)
BILIRUB SERPL-MCNC: 0.6 MG/DL (ref 0.2–1.3)
BNP SERPL-MCNC: 335 PG/ML (ref 0–100)
BUN SERPL-MCNC: 15 MG/DL (ref 7–23)
BUN/CREAT SERPL: 18.8 (ref 7–25)
CALCIUM SPEC-SCNC: 9 MG/DL (ref 8.4–10.2)
CHLORIDE SERPL-SCNC: 105 MMOL/L (ref 101–112)
CO2 SERPL-SCNC: 25 MMOL/L (ref 22–30)
CREAT SERPL-MCNC: 0.8 MG/DL (ref 0.52–1.04)
DEPRECATED RDW RBC AUTO: 42 FL (ref 37–54)
EGFRCR SERPLBLD CKD-EPI 2021: 71 ML/MIN/1.73
EOSINOPHIL # BLD AUTO: 0.09 10*3/MM3 (ref 0–0.4)
EOSINOPHIL NFR BLD AUTO: 1.8 % (ref 0.3–6.2)
ERYTHROCYTE [DISTWIDTH] IN BLOOD BY AUTOMATED COUNT: 12.2 % (ref 12.3–15.4)
GLOBULIN UR ELPH-MCNC: 3.5 GM/DL (ref 2.3–3.5)
GLUCOSE SERPL-MCNC: 103 MG/DL (ref 70–99)
HCT VFR BLD AUTO: 39 % (ref 34–46.6)
HGB BLD-MCNC: 12.8 G/DL (ref 12–15.9)
LYMPHOCYTES # BLD AUTO: 0.67 10*3/MM3 (ref 0.7–3.1)
LYMPHOCYTES NFR BLD AUTO: 13.6 % (ref 19.6–45.3)
MCH RBC QN AUTO: 32.1 PG (ref 26.6–33)
MCHC RBC AUTO-ENTMCNC: 32.8 G/DL (ref 31.5–35.7)
MCV RBC AUTO: 97.7 FL (ref 79–97)
MONOCYTES # BLD AUTO: 0.55 10*3/MM3 (ref 0.1–0.9)
MONOCYTES NFR BLD AUTO: 11.2 % (ref 5–12)
NEUTROPHILS NFR BLD AUTO: 3.56 10*3/MM3 (ref 1.7–7)
NEUTROPHILS NFR BLD AUTO: 72.6 % (ref 42.7–76)
PLATELET # BLD AUTO: 203 10*3/MM3 (ref 140–450)
PMV BLD AUTO: 9.8 FL (ref 6–12)
POTASSIUM SERPL-SCNC: 4.4 MMOL/L (ref 3.4–5)
PROT SERPL-MCNC: 7.5 G/DL (ref 6.3–8.6)
RBC # BLD AUTO: 3.99 10*6/MM3 (ref 3.77–5.28)
SODIUM SERPL-SCNC: 137 MMOL/L (ref 137–145)
WBC NRBC COR # BLD: 4.91 10*3/MM3 (ref 3.4–10.8)

## 2023-02-27 PROCEDURE — 85025 COMPLETE CBC W/AUTO DIFF WBC: CPT | Performed by: INTERNAL MEDICINE

## 2023-02-27 PROCEDURE — 83540 ASSAY OF IRON: CPT | Performed by: INTERNAL MEDICINE

## 2023-02-27 PROCEDURE — 36415 COLL VENOUS BLD VENIPUNCTURE: CPT | Performed by: INTERNAL MEDICINE

## 2023-02-27 PROCEDURE — 83880 ASSAY OF NATRIURETIC PEPTIDE: CPT | Performed by: INTERNAL MEDICINE

## 2023-02-27 PROCEDURE — 82306 VITAMIN D 25 HYDROXY: CPT | Performed by: INTERNAL MEDICINE

## 2023-02-27 PROCEDURE — 80185 ASSAY OF PHENYTOIN TOTAL: CPT | Performed by: INTERNAL MEDICINE

## 2023-02-27 PROCEDURE — 82607 VITAMIN B-12: CPT | Performed by: INTERNAL MEDICINE

## 2023-02-27 PROCEDURE — 82746 ASSAY OF FOLIC ACID SERUM: CPT | Performed by: INTERNAL MEDICINE

## 2023-02-27 PROCEDURE — 82728 ASSAY OF FERRITIN: CPT | Performed by: INTERNAL MEDICINE

## 2023-02-27 PROCEDURE — 83721 ASSAY OF BLOOD LIPOPROTEIN: CPT | Performed by: INTERNAL MEDICINE

## 2023-02-27 PROCEDURE — 80053 COMPREHEN METABOLIC PANEL: CPT | Performed by: INTERNAL MEDICINE

## 2023-02-27 PROCEDURE — 84466 ASSAY OF TRANSFERRIN: CPT | Performed by: INTERNAL MEDICINE

## 2023-02-27 RX ORDER — HYDRALAZINE HYDROCHLORIDE 50 MG/1
TABLET, FILM COATED ORAL
Qty: 270 TABLET | Refills: 3 | Status: SHIPPED | OUTPATIENT
Start: 2023-02-27

## 2023-02-28 LAB
25(OH)D3 SERPL-MCNC: 70.8 NG/ML (ref 30–100)
ARTICHOKE IGE QN: 69 MG/DL (ref 0–100)
FERRITIN SERPL-MCNC: 95.6 NG/ML (ref 13–150)
FOLATE SERPL-MCNC: 6.64 NG/ML (ref 4.78–24.2)
IRON 24H UR-MRATE: 124 MCG/DL (ref 37–145)
IRON SATN MFR SERPL: 32 % (ref 20–50)
PHENYTOIN SERPL-MCNC: 11.4 MCG/ML (ref 10–20)
TIBC SERPL-MCNC: 384 MCG/DL (ref 298–536)
TRANSFERRIN SERPL-MCNC: 258 MG/DL (ref 200–360)
VIT B12 BLD-MCNC: 461 PG/ML (ref 211–946)

## 2023-03-03 NOTE — PROGRESS NOTES
Chief Complaint  Mixed hyperlipidemia , Essential hypertension , Chronic systolic congestive heart failure , Iron deficiency anemia, Dietary vitamin B12 deficiency anemia, Vitamin D Deficiency, Seizure disorder , Osteoporosis, Chronic obstructive lung disease , and Elevated LFT    Subjective        History of Present Illness     Neisha Duran presents to the office for 4-month follow-up of multiple complex issues including extensive vascular disease, hyperlipidemia, labile, severe hypertension, systolic CHF/cardiomyopathy, carotid artery stenosis, COPD, and iron and vitamin B12 deficiency anemia among many other issues.  She has a defibrillator/pacemaker due to remote history of V. tach that resulted in sudden cardiac death. She continues on sotalol and Plavix.    Blood pressure is well controlled in the office today, and they report blood pressure at home has been much better and much more stable.    Weight is down 6 pounds in the past 4 months.  BMI is now 21.  She seems to have less appetite since her COVID-19 infection last August.  She is eating less junk food and more vegetables due to receiving Meals on Wheels.    DEXA 12/01/2022 continues to reveal normal density of the spine. However, hip density has decreased slightly, 1.4% in the prior 2 years putting her back into osteoporosis category for the hip. She continues calcium/vitamin D supplements.  Last fall, I asked her to decrease prescription  vitamin D  50,000 units weekly to twice monthly, which she has done; however, vitamin D is actually higher at 70.8.  She had trouble tolerating IV Reclast, which she felt increased musculoskeletal aches and pains.  She has been receiving Prolia injections every 6 months for the past 7 years.    We continue to follow elevated liver enzymes.  She and her daughter have nto desired any extensive work-up.  CT imaging failed to reveal any evidence of metastatic liver disease.  AST remains above goal at 52, although  "vastly improved from 103 four months ago.  ALT normal range at 25.  Her elevated Dilantin level has resolved by slightly reducing the dose of Dilantin.  There have been no seizure episodes in the past 4 months.    Lab results are reviewed with the patient today.   CBC unremarkable.  Fasting glucose 103.  Normal renal function.  Iron levels at goal.  LDL cholesterol 69 with simvastatin.    Overall, she seems to be doing quite well and better in some regards.  She is still high risk for falls.  No new complaints.    Objective   Vital Signs:  /70 (BP Location: Left arm, Patient Position: Sitting, Cuff Size: Adult)   Pulse 55 Comment: regular  Temp 97.6 °F (36.4 °C) (Tympanic)   Ht 160 cm (63\")   Wt 53.5 kg (118 lb)   SpO2 97%   BMI 20.90 kg/m²   Estimated body mass index is 20.9 kg/m² as calculated from the following:    Height as of this encounter: 160 cm (63\").    Weight as of this encounter: 53.5 kg (118 lb).       BMI is within normal parameters. No other follow-up for BMI required.      Physical Exam  Vitals reviewed.   Constitutional:       General: She is not in acute distress.     Appearance: She is well-developed.      Comments: Very pleasant, frail.  Accompanied by her daughter   HENT:      Head: Normocephalic and atraumatic.      Nose:      Right Sinus: No maxillary sinus tenderness or frontal sinus tenderness.      Left Sinus: No maxillary sinus tenderness or frontal sinus tenderness.      Mouth/Throat:      Mouth: No oral lesions.      Pharynx: Uvula midline.      Tonsils: No tonsillar exudate.   Eyes:      Conjunctiva/sclera: Conjunctivae normal.      Pupils: Pupils are equal, round, and reactive to light.   Neck:      Thyroid: No thyroid mass or thyromegaly.      Vascular: No carotid bruit or JVD.      Trachea: Trachea normal. No tracheal deviation.   Cardiovascular:      Rate and Rhythm: Normal rate and regular rhythm.  No extrasystoles are present.     Chest Wall: PMI is not displaced.      " Heart sounds: Normal heart sounds.      Comments: Defibrillator/pacemaker noted  Pulmonary:      Effort: Pulmonary effort is normal. No accessory muscle usage or respiratory distress.      Breath sounds: No decreased breath sounds, wheezing, rhonchi or rales.      Comments: Moderate air movement bilaterally with chronic lung sounds  Abdominal:      General: Bowel sounds are normal. There is no distension.      Palpations: Abdomen is soft.      Tenderness: There is no abdominal tenderness.   Musculoskeletal:      Cervical back: Neck supple.   Lymphadenopathy:      Cervical: No cervical adenopathy.   Skin:     General: Skin is warm and dry.      Findings: No rash.      Nails: There is no clubbing.   Neurological:      Mental Status: She is alert and oriented to person, place, and time. Mental status is at baseline.      Cranial Nerves: No cranial nerve deficit.      Coordination: Coordination normal.   Psychiatric:         Speech: Speech normal.         Behavior: Behavior normal.         Thought Content: Thought content normal.         Judgment: Judgment normal.      Comments: Smiling.  Good eye contact            Result Review :    CMP    CMP 6/15/22 10/26/22 2/27/23   Glucose 120 (A) 121 (A) 103 (A)   BUN 18 17 15   Creatinine 0.88 0.90 0.80   eGFR 63.7 62.0 71.0   Sodium 142 137 137   Potassium 4.4 4.1 4.4   Chloride 109 107 105   Calcium 9.1 9.1 9.0   Total Protein 7.4 7.0 7.5   Albumin 4.10 4.00 4.0   Globulin 3.3 3.0 3.5   Total Bilirubin 0.5 0.7 0.6   Alkaline Phosphatase 51 55 57   AST (SGOT) 84 (A) 103 (A) 52 (A)   ALT (SGPT) 60 (A) 68 (A) 25   Albumin/Globulin Ratio 1.2 1.3 1.1   BUN/Creatinine Ratio 20.5 18.9 18.8   Anion Gap 9.0 6.0 7.0   (A) Abnormal value       Comments are available for some flowsheets but are not being displayed.           CBC w/diff    CBC w/Diff 6/15/22 10/26/22 2/27/23   WBC 5.74 4.13 4.91   RBC 4.03 3.82 3.99   Hemoglobin 12.9 12.1 12.8   Hematocrit 38.7 37.0 39.0   MCV 96.0 96.9  97.7 (A)   MCH 32.0 31.7 32.1   MCHC 33.3 32.7 32.8   RDW 12.6 12.8 12.2 (A)   Platelets 202 205 203   Neutrophil Rel % 76.0 69.2 72.6   Lymphocyte Rel % 12.5 (A) 16.5 (A) 13.6 (A)   Monocyte Rel % 8.7 10.7 11.2   Eosinophil Rel % 2.1 2.4 1.8   Basophil Rel % 0.7 1.2 0.8   (A) Abnormal value            Lipid Panel    Lipid Panel 6/15/22 10/26/22 2/27/23   Total Cholesterol  174    Triglycerides  158 (A)    HDL Cholesterol  88 (A)    VLDL Cholesterol  26    LDL Cholesterol  61 60 69   LDL/HDL Ratio  0.62    (A) Abnormal value            TSH    TSH 6/15/22 10/26/22   TSH 2.270 2.310               Data reviewed: Radiologic studies DEXA 12/01/2022              Assessment and Plan   Diagnoses and all orders for this visit:    1. Essential hypertension (Primary)  -     Comprehensive Metabolic Panel; Future    2. Labile hypertension  -     Comprehensive Metabolic Panel; Future    3. Chronic systolic congestive heart failure (CMS/HCC) - EF 30%  -     BNP; Future  -     Magnesium; Future    4. Paroxysmal atrial fibrillation (HCC)  -     Comprehensive Metabolic Panel; Future  -     TSH; Future    5. Automatic implantable cardioverter-defibrillator in situ    6. History of sustained ventricular tachycardia - S/P ICD placement  -     Comprehensive Metabolic Panel; Future  -     Magnesium; Future    7. Ischemic cardiomyopathy  -     BNP; Future    8. Vitamin D deficiency  -     vitamin D (ERGOCALCIFEROL) 1.25 MG (73135 UT) capsule capsule; Take 1 capsule by mouth Every 30 (Thirty) Days.  Dispense: 3 capsule; Refill: 3  -     Vitamin D,25-Hydroxy; Future    9. Iron deficiency anemia secondary to inadequate dietary iron intake  -     CBC Auto Differential; Future  -     Ferritin; Future  -     Iron Profile; Future    10. Dietary vitamin B12 deficiency anemia  -     BNP; Future  -     Vitamin B12; Future    11. Recurrent major depressive disorder, in full remission (HCC)    12. Age-related osteoporosis without current pathological  fracture  -     CBC Auto Differential; Future  -     Vitamin D,25-Hydroxy; Future    13. Seizure disorder (HCC)  -     Comprehensive Metabolic Panel; Future  -     Phenytoin Level, Total; Future    14. Panlobular emphysema (HCC)  -     CBC Auto Differential; Future    15. Unsteadiness  -     Comprehensive Metabolic Panel; Future    16. Mixed hyperlipidemia  -     LDL Cholesterol, Direct; Future    17. Renal lesion - Right, exophytic. Noted on CT 2022.  Conservative observation (U/S) due to age and life expectancy      Hypertension and CHF/cardiomyopathy is stable.  Labile hypertension is improved.  Continue current multidrug therapy and sodium restriction.  Continue sotalol this patient with history of V. tach requiring defibrillator placement.  Continue monitoring blood pressure at home.    Continue simvastatin.  LDL cholesterol is at goal.  Triglycerides are mildly elevated, but not clinically significant    The osteoporosis of the hip persists.  She has felt that Prolia injections sometimes make her feel bad for a while.  We will take a little break from the Prolia, although I believe it has been helpful in reducing her fracture risk.  We will continue to reassess risk versus benefit.  Continue the current daily calcium supplement.  She may decrease the prescription vitamin D to 50,000 units monthly.    Continue the lower dose of Dilantin.  She is therapeutic and level is now back to goal.  Perhaps the elevated Dilantin level was playing a role in her previous issues with of elevated LFTs.  LFTs are still slightly above normal baseline, but much improved from last year.  Perhaps the improved diet has also helped.    No current repeat evaluation of the exophytic right renal lesion is planned.  This was noted on CT scan last year.  We may consider a renal ultrasound later, but we will continue to be quite conservative given her life expectancy.    Emphysema is stable.  Continue to avoid all tobacco products.  She  does not feel the need for an inhaler.    Continue fall precautions, as she continues to be rather high risk for falls.    Continue the Effexor XR for depression, which seems to be adequately controlled.  Continue the good family support and social interactions, which has been very helpful    Return to clinic in 6 months with fasting labs prior, sooner if needed.                      Follow Up   Return in about 6 months (around 9/9/2023) for Next scheduled follow up - labs 1 week prior.  Patient was given instructions and counseling regarding her condition or for health maintenance advice. Please see specific information pulled into the AVS if appropriate.

## 2023-03-09 ENCOUNTER — OFFICE VISIT (OUTPATIENT)
Dept: FAMILY MEDICINE CLINIC | Facility: CLINIC | Age: 88
End: 2023-03-09
Payer: MEDICARE

## 2023-03-09 VITALS
OXYGEN SATURATION: 97 % | HEIGHT: 63 IN | BODY MASS INDEX: 20.91 KG/M2 | SYSTOLIC BLOOD PRESSURE: 124 MMHG | WEIGHT: 118 LBS | TEMPERATURE: 97.6 F | DIASTOLIC BLOOD PRESSURE: 70 MMHG | HEART RATE: 55 BPM

## 2023-03-09 DIAGNOSIS — F33.42 RECURRENT MAJOR DEPRESSIVE DISORDER, IN FULL REMISSION: Chronic | ICD-10-CM

## 2023-03-09 DIAGNOSIS — I48.0 PAROXYSMAL ATRIAL FIBRILLATION: Chronic | ICD-10-CM

## 2023-03-09 DIAGNOSIS — J43.1 PANLOBULAR EMPHYSEMA: Chronic | ICD-10-CM

## 2023-03-09 DIAGNOSIS — I10 ESSENTIAL HYPERTENSION: Primary | Chronic | ICD-10-CM

## 2023-03-09 DIAGNOSIS — N28.9 RENAL LESION: Chronic | ICD-10-CM

## 2023-03-09 DIAGNOSIS — Z95.810 AUTOMATIC IMPLANTABLE CARDIOVERTER-DEFIBRILLATOR IN SITU: ICD-10-CM

## 2023-03-09 DIAGNOSIS — I50.22 CHRONIC SYSTOLIC CONGESTIVE HEART FAILURE: Chronic | ICD-10-CM

## 2023-03-09 DIAGNOSIS — R09.89 LABILE HYPERTENSION: Chronic | ICD-10-CM

## 2023-03-09 DIAGNOSIS — M81.0 AGE-RELATED OSTEOPOROSIS WITHOUT CURRENT PATHOLOGICAL FRACTURE: Chronic | ICD-10-CM

## 2023-03-09 DIAGNOSIS — Z86.79 HISTORY OF SUSTAINED VENTRICULAR TACHYCARDIA: Chronic | ICD-10-CM

## 2023-03-09 DIAGNOSIS — D51.8 DIETARY VITAMIN B12 DEFICIENCY ANEMIA: Chronic | ICD-10-CM

## 2023-03-09 DIAGNOSIS — R26.81 UNSTEADINESS: ICD-10-CM

## 2023-03-09 DIAGNOSIS — G40.909 SEIZURE DISORDER: Chronic | ICD-10-CM

## 2023-03-09 DIAGNOSIS — I25.5 ISCHEMIC CARDIOMYOPATHY: Chronic | ICD-10-CM

## 2023-03-09 DIAGNOSIS — D50.8 IRON DEFICIENCY ANEMIA SECONDARY TO INADEQUATE DIETARY IRON INTAKE: Chronic | ICD-10-CM

## 2023-03-09 DIAGNOSIS — E55.9 VITAMIN D DEFICIENCY: Chronic | ICD-10-CM

## 2023-03-09 DIAGNOSIS — E78.2 MIXED HYPERLIPIDEMIA: Chronic | ICD-10-CM

## 2023-03-09 PROCEDURE — 99214 OFFICE O/P EST MOD 30 MIN: CPT | Performed by: INTERNAL MEDICINE

## 2023-03-09 RX ORDER — ERGOCALCIFEROL 1.25 MG/1
50000 CAPSULE ORAL
Qty: 3 CAPSULE | Refills: 3 | Status: SHIPPED | OUTPATIENT
Start: 2023-03-09

## 2023-04-11 DIAGNOSIS — D50.8 IRON DEFICIENCY ANEMIA SECONDARY TO INADEQUATE DIETARY IRON INTAKE: Primary | ICD-10-CM

## 2023-04-11 RX ORDER — IRON POLYSACCHARIDE COMPLEX 150 MG
CAPSULE ORAL
Qty: 36 CAPSULE | Refills: 3 | OUTPATIENT
Start: 2023-04-11

## 2023-04-14 RX ORDER — IRON POLYSACCHARIDE COMPLEX 150 MG
CAPSULE ORAL
Qty: 36 CAPSULE | Refills: 3 | Status: SHIPPED | OUTPATIENT
Start: 2023-04-14 | End: 2023-04-14 | Stop reason: SDUPTHER

## 2023-04-14 RX ORDER — IRON POLYSACCHARIDE COMPLEX 150 MG
150 CAPSULE ORAL 3 TIMES WEEKLY
Qty: 36 CAPSULE | Refills: 3 | Status: SHIPPED | OUTPATIENT
Start: 2023-04-14

## 2023-05-24 RX ORDER — OLMESARTAN MEDOXOMIL 40 MG/1
40 TABLET ORAL NIGHTLY
Qty: 30 TABLET | Refills: 3 | Status: SHIPPED | OUTPATIENT
Start: 2023-05-24

## 2023-05-24 RX ORDER — AMLODIPINE BESYLATE 5 MG/1
5 TABLET ORAL 2 TIMES DAILY
Qty: 60 TABLET | Refills: 3 | Status: SHIPPED | OUTPATIENT
Start: 2023-05-24

## 2023-05-24 RX ORDER — SOTALOL HYDROCHLORIDE 80 MG/1
80 TABLET ORAL 2 TIMES DAILY
Qty: 60 TABLET | Refills: 3 | Status: SHIPPED | OUTPATIENT
Start: 2023-05-24

## 2023-06-14 DIAGNOSIS — F33.42 RECURRENT MAJOR DEPRESSIVE DISORDER, IN FULL REMISSION: Primary | Chronic | ICD-10-CM

## 2023-06-14 RX ORDER — VENLAFAXINE HYDROCHLORIDE 150 MG/1
150 CAPSULE, EXTENDED RELEASE ORAL DAILY
Qty: 90 CAPSULE | Refills: 3 | Status: SHIPPED | OUTPATIENT
Start: 2023-06-14

## 2023-08-22 DIAGNOSIS — Z29.9 UNSPECIFIED PROPHYLACTIC OR TREATMENT MEASURE: ICD-10-CM

## 2023-08-22 DIAGNOSIS — Z87.440 HISTORY OF RECURRENT UTI (URINARY TRACT INFECTION): ICD-10-CM

## 2023-08-23 RX ORDER — NITROFURANTOIN 25; 75 MG/1; MG/1
CAPSULE ORAL
Qty: 30 CAPSULE | Refills: 11 | Status: SHIPPED | OUTPATIENT
Start: 2023-08-23

## 2023-08-31 ENCOUNTER — LAB (OUTPATIENT)
Dept: LAB | Facility: OTHER | Age: 88
End: 2023-08-31
Payer: MEDICARE

## 2023-08-31 DIAGNOSIS — R09.89 LABILE HYPERTENSION: Chronic | ICD-10-CM

## 2023-08-31 DIAGNOSIS — E55.9 VITAMIN D DEFICIENCY: Chronic | ICD-10-CM

## 2023-08-31 DIAGNOSIS — I48.0 PAROXYSMAL ATRIAL FIBRILLATION: Chronic | ICD-10-CM

## 2023-08-31 DIAGNOSIS — J43.1 PANLOBULAR EMPHYSEMA: Chronic | ICD-10-CM

## 2023-08-31 DIAGNOSIS — D50.8 IRON DEFICIENCY ANEMIA SECONDARY TO INADEQUATE DIETARY IRON INTAKE: Chronic | ICD-10-CM

## 2023-08-31 DIAGNOSIS — D51.8 DIETARY VITAMIN B12 DEFICIENCY ANEMIA: Chronic | ICD-10-CM

## 2023-08-31 DIAGNOSIS — M81.0 AGE-RELATED OSTEOPOROSIS WITHOUT CURRENT PATHOLOGICAL FRACTURE: Chronic | ICD-10-CM

## 2023-08-31 DIAGNOSIS — E78.2 MIXED HYPERLIPIDEMIA: Chronic | ICD-10-CM

## 2023-08-31 DIAGNOSIS — I50.22 CHRONIC SYSTOLIC CONGESTIVE HEART FAILURE: Chronic | ICD-10-CM

## 2023-08-31 DIAGNOSIS — I25.5 ISCHEMIC CARDIOMYOPATHY: Chronic | ICD-10-CM

## 2023-08-31 DIAGNOSIS — Z86.79 HISTORY OF SUSTAINED VENTRICULAR TACHYCARDIA: Chronic | ICD-10-CM

## 2023-08-31 DIAGNOSIS — R26.81 UNSTEADINESS: ICD-10-CM

## 2023-08-31 DIAGNOSIS — I10 ESSENTIAL HYPERTENSION: Chronic | ICD-10-CM

## 2023-08-31 DIAGNOSIS — G40.909 SEIZURE DISORDER: Chronic | ICD-10-CM

## 2023-08-31 LAB
ALBUMIN SERPL-MCNC: 4.1 G/DL (ref 3.5–5)
ALBUMIN/GLOB SERPL: 1.3 G/DL (ref 1.1–1.8)
ALP SERPL-CCNC: 36 U/L (ref 38–126)
ALT SERPL W P-5'-P-CCNC: 24 U/L
ANION GAP SERPL CALCULATED.3IONS-SCNC: 9 MMOL/L (ref 5–15)
AST SERPL-CCNC: 33 U/L (ref 14–36)
BASOPHILS # BLD AUTO: 0.04 10*3/MM3 (ref 0–0.2)
BASOPHILS NFR BLD AUTO: 1 % (ref 0–1.5)
BILIRUB SERPL-MCNC: 0.6 MG/DL (ref 0–1.2)
BNP SERPL-MCNC: 225 PG/ML (ref 0–100)
BUN SERPL-MCNC: 23 MG/DL (ref 7–23)
BUN/CREAT SERPL: 22.3 (ref 7–25)
CALCIUM SPEC-SCNC: 8.8 MG/DL (ref 8.4–10.2)
CHLORIDE SERPL-SCNC: 103 MMOL/L (ref 101–112)
CO2 SERPL-SCNC: 26 MMOL/L (ref 22–30)
CREAT SERPL-MCNC: 1.03 MG/DL (ref 0.52–1.04)
DEPRECATED RDW RBC AUTO: 42 FL (ref 37–54)
EGFRCR SERPLBLD CKD-EPI 2021: 52.4 ML/MIN/1.73
EOSINOPHIL # BLD AUTO: 0.12 10*3/MM3 (ref 0–0.4)
EOSINOPHIL NFR BLD AUTO: 3 % (ref 0.3–6.2)
ERYTHROCYTE [DISTWIDTH] IN BLOOD BY AUTOMATED COUNT: 12.3 % (ref 12.3–15.4)
GLOBULIN UR ELPH-MCNC: 3.2 GM/DL (ref 2.3–3.5)
GLUCOSE SERPL-MCNC: 106 MG/DL (ref 70–99)
HCT VFR BLD AUTO: 37.3 % (ref 34–46.6)
HGB BLD-MCNC: 12.4 G/DL (ref 12–15.9)
LYMPHOCYTES # BLD AUTO: 0.67 10*3/MM3 (ref 0.7–3.1)
LYMPHOCYTES NFR BLD AUTO: 16.5 % (ref 19.6–45.3)
MAGNESIUM SERPL-MCNC: 1.9 MG/DL (ref 1.6–2.3)
MCH RBC QN AUTO: 32.4 PG (ref 26.6–33)
MCHC RBC AUTO-ENTMCNC: 33.2 G/DL (ref 31.5–35.7)
MCV RBC AUTO: 97.4 FL (ref 79–97)
MONOCYTES # BLD AUTO: 0.42 10*3/MM3 (ref 0.1–0.9)
MONOCYTES NFR BLD AUTO: 10.4 % (ref 5–12)
NEUTROPHILS NFR BLD AUTO: 2.8 10*3/MM3 (ref 1.7–7)
NEUTROPHILS NFR BLD AUTO: 69.1 % (ref 42.7–76)
PLATELET # BLD AUTO: 157 10*3/MM3 (ref 140–450)
PMV BLD AUTO: 9.7 FL (ref 6–12)
POTASSIUM SERPL-SCNC: 4.2 MMOL/L (ref 3.4–5)
PROT SERPL-MCNC: 7.3 G/DL (ref 6.3–8.6)
RBC # BLD AUTO: 3.83 10*6/MM3 (ref 3.77–5.28)
SODIUM SERPL-SCNC: 138 MMOL/L (ref 137–145)
WBC NRBC COR # BLD: 4.05 10*3/MM3 (ref 3.4–10.8)

## 2023-08-31 PROCEDURE — 36415 COLL VENOUS BLD VENIPUNCTURE: CPT | Performed by: INTERNAL MEDICINE

## 2023-08-31 PROCEDURE — 82728 ASSAY OF FERRITIN: CPT | Performed by: INTERNAL MEDICINE

## 2023-08-31 PROCEDURE — 82607 VITAMIN B-12: CPT | Performed by: INTERNAL MEDICINE

## 2023-08-31 PROCEDURE — 82306 VITAMIN D 25 HYDROXY: CPT | Performed by: INTERNAL MEDICINE

## 2023-08-31 PROCEDURE — 80053 COMPREHEN METABOLIC PANEL: CPT | Performed by: INTERNAL MEDICINE

## 2023-08-31 PROCEDURE — 83735 ASSAY OF MAGNESIUM: CPT | Performed by: INTERNAL MEDICINE

## 2023-08-31 PROCEDURE — 83721 ASSAY OF BLOOD LIPOPROTEIN: CPT | Performed by: INTERNAL MEDICINE

## 2023-08-31 PROCEDURE — 84443 ASSAY THYROID STIM HORMONE: CPT | Performed by: INTERNAL MEDICINE

## 2023-08-31 PROCEDURE — 84466 ASSAY OF TRANSFERRIN: CPT | Performed by: INTERNAL MEDICINE

## 2023-08-31 PROCEDURE — 83540 ASSAY OF IRON: CPT | Performed by: INTERNAL MEDICINE

## 2023-08-31 PROCEDURE — 85025 COMPLETE CBC W/AUTO DIFF WBC: CPT | Performed by: INTERNAL MEDICINE

## 2023-08-31 PROCEDURE — 83880 ASSAY OF NATRIURETIC PEPTIDE: CPT | Performed by: INTERNAL MEDICINE

## 2023-08-31 PROCEDURE — 80185 ASSAY OF PHENYTOIN TOTAL: CPT | Performed by: INTERNAL MEDICINE

## 2023-09-01 LAB
25(OH)D3 SERPL-MCNC: 38.8 NG/ML (ref 30–100)
ARTICHOKE IGE QN: 62 MG/DL (ref 0–100)
FERRITIN SERPL-MCNC: 68.3 NG/ML (ref 13–150)
IRON 24H UR-MRATE: 58 MCG/DL (ref 37–145)
IRON SATN MFR SERPL: 14 % (ref 20–50)
PHENYTOIN SERPL-MCNC: 1.9 MCG/ML (ref 10–20)
TIBC SERPL-MCNC: 411 MCG/DL (ref 298–536)
TRANSFERRIN SERPL-MCNC: 276 MG/DL (ref 200–360)
TSH SERPL DL<=0.05 MIU/L-ACNC: 3.66 UIU/ML (ref 0.27–4.2)
VIT B12 BLD-MCNC: 708 PG/ML (ref 211–946)

## 2023-09-12 ENCOUNTER — OFFICE VISIT (OUTPATIENT)
Dept: FAMILY MEDICINE CLINIC | Facility: CLINIC | Age: 88
End: 2023-09-12
Payer: MEDICARE

## 2023-09-12 VITALS
BODY MASS INDEX: 22.25 KG/M2 | HEIGHT: 63 IN | DIASTOLIC BLOOD PRESSURE: 60 MMHG | SYSTOLIC BLOOD PRESSURE: 118 MMHG | OXYGEN SATURATION: 95 % | WEIGHT: 125.6 LBS | HEART RATE: 52 BPM | TEMPERATURE: 97 F

## 2023-09-12 DIAGNOSIS — I50.22 CHRONIC SYSTOLIC CONGESTIVE HEART FAILURE: Chronic | ICD-10-CM

## 2023-09-12 DIAGNOSIS — F33.42 RECURRENT MAJOR DEPRESSIVE DISORDER, IN FULL REMISSION: Chronic | ICD-10-CM

## 2023-09-12 DIAGNOSIS — D50.8 IRON DEFICIENCY ANEMIA SECONDARY TO INADEQUATE DIETARY IRON INTAKE: Chronic | ICD-10-CM

## 2023-09-12 DIAGNOSIS — G40.909 SEIZURE DISORDER: Chronic | ICD-10-CM

## 2023-09-12 DIAGNOSIS — R41.89 COGNITIVE IMPAIRMENT: Chronic | ICD-10-CM

## 2023-09-12 DIAGNOSIS — D51.8 DIETARY VITAMIN B12 DEFICIENCY ANEMIA: Chronic | ICD-10-CM

## 2023-09-12 DIAGNOSIS — H90.3 SENSORINEURAL HEARING LOSS (SNHL) OF BOTH EARS: Chronic | ICD-10-CM

## 2023-09-12 DIAGNOSIS — I25.5 ISCHEMIC CARDIOMYOPATHY: Chronic | ICD-10-CM

## 2023-09-12 DIAGNOSIS — E55.9 VITAMIN D DEFICIENCY: Chronic | ICD-10-CM

## 2023-09-12 DIAGNOSIS — E78.2 MIXED HYPERLIPIDEMIA: Chronic | ICD-10-CM

## 2023-09-12 DIAGNOSIS — Z86.79 HISTORY OF SUSTAINED VENTRICULAR TACHYCARDIA: Chronic | ICD-10-CM

## 2023-09-12 DIAGNOSIS — I10 ESSENTIAL HYPERTENSION: Primary | Chronic | ICD-10-CM

## 2023-09-12 PROCEDURE — 99214 OFFICE O/P EST MOD 30 MIN: CPT | Performed by: INTERNAL MEDICINE

## 2023-09-12 PROCEDURE — 1160F RVW MEDS BY RX/DR IN RCRD: CPT | Performed by: INTERNAL MEDICINE

## 2023-09-12 PROCEDURE — 1159F MED LIST DOCD IN RCRD: CPT | Performed by: INTERNAL MEDICINE

## 2023-09-12 RX ORDER — IRON POLYSACCHARIDE COMPLEX 150 MG
150 CAPSULE ORAL DAILY
Qty: 90 CAPSULE | Refills: 3 | Status: SHIPPED | OUTPATIENT
Start: 2023-09-12

## 2023-09-12 RX ORDER — PHENYTOIN SODIUM 100 MG/1
100 CAPSULE, EXTENDED RELEASE ORAL 3 TIMES WEEKLY
Qty: 40 CAPSULE | Refills: 3 | Status: SHIPPED | OUTPATIENT
Start: 2023-09-12

## 2023-09-12 RX ORDER — LEVETIRACETAM 500 MG/1
TABLET, EXTENDED RELEASE ORAL
Qty: 150 TABLET | Refills: 11 | Status: SHIPPED | OUTPATIENT
Start: 2023-09-12

## 2023-09-12 NOTE — PROGRESS NOTES
"Chief Complaint  Follow-up (6 month), hearing loss , and Medication Problem (Wants to talk about Keppra and Dilantin )    Subjective        History of Present Illness    Neisha Duran presents to the office for 6-month follow-up of multiple complex issues including extensive vascular disease, hyperlipidemia, labile, severe hypertension, systolic CHF/cardiomyopathy, carotid artery stenosis, COPD, and iron and vitamin B12 deficiency anemia among many other issues.  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 . She continues on sotalol and Plavix.      Patient continues Dilantin 3 days weekly.  Her daughter tried stopping completely, but patient reported feeling bad and restarted at 100 mg 3 days weekly.  She continues on 6 Keppra nightly currently. The Keppra also serves as a mood stabilizer    Patient and her daughter have noticed gradually worsened hearing loss.  No evidence of otitis or cerumen impactions on exam today.      Weight is up 7 pounds in the past six months. Her appetite has improved.  BP improved and at goal with current BP medications.      The patient's relevant past medical, surgical, and social history was reviewed in Epic.    Lab results are reviewed with the patient today.  CBC reveals hemoglobin slightly lower at 12.4 with iron saturation is dropping with oral iron three days weekly.  Vitamin D at goal with monthly prescription vitamin D.  I am very pleased to see that her elevated liver enzymes have normalized and at goal.  LDL at goal with simvastatin.    Objective   Vital Signs:  /60   Pulse 52   Temp 97 °F (36.1 °C)   Ht 160 cm (63\")   Wt 57 kg (125 lb 9.6 oz)   SpO2 95%   BMI 22.25 kg/m²   Estimated body mass index is 22.25 kg/m² as calculated from the following:    Height as of this encounter: 160 cm (63\").    Weight as of this encounter: 57 kg (125 lb 9.6 oz).       BMI is within normal parameters. No other " follow-up for BMI required.      Physical Exam  Vitals reviewed.   Constitutional:       General: She is not in acute distress.     Appearance: She is well-developed.      Comments: Pleasant elderly female.  Ambulating with a cane.  Accompanied by daughter.    HENT:      Head: Normocephalic and atraumatic.      Nose:      Right Sinus: No maxillary sinus tenderness or frontal sinus tenderness.      Left Sinus: No maxillary sinus tenderness or frontal sinus tenderness.      Mouth/Throat:      Mouth: No oral lesions.      Pharynx: Uvula midline.      Tonsils: No tonsillar exudate.   Eyes:      Conjunctiva/sclera: Conjunctivae normal.      Pupils: Pupils are equal, round, and reactive to light.   Neck:      Thyroid: No thyroid mass or thyromegaly.      Vascular: No carotid bruit or JVD.      Trachea: Trachea normal. No tracheal deviation.   Cardiovascular:      Rate and Rhythm: Normal rate and regular rhythm. No extrasystoles are present.     Chest Wall: PMI is not displaced.      Heart sounds: Murmur heard.      Comments: 2/6 systolic murmur across the precordium.     Pulmonary:      Effort: Pulmonary effort is normal. No accessory muscle usage or respiratory distress.      Breath sounds: No decreased breath sounds, wheezing, rhonchi or rales.      Comments: Chronic lung sounds  Abdominal:      General: Bowel sounds are normal. There is no distension.      Palpations: Abdomen is soft.      Tenderness: There is no abdominal tenderness.   Musculoskeletal:      Cervical back: Neck supple.   Feet:      Comments: 1+ pulses bilateral ankles.   Lymphadenopathy:      Cervical: No cervical adenopathy.   Skin:     General: Skin is warm and dry.      Findings: No rash.      Nails: There is no clubbing.   Neurological:      Mental Status: She is alert and oriented to person, place, and time. Mental status is at baseline.      Cranial Nerves: No cranial nerve deficit.      Coordination: Coordination normal.   Psychiatric:          Speech: Speech normal.         Behavior: Behavior normal.         Thought Content: Thought content normal.         Judgment: Judgment normal.          Result Review :  The following data was reviewed by: Keith Causey MD on 09/12/2023:  CMP          10/26/2022    09:37 2/27/2023    09:11 8/31/2023    09:01   CMP   Glucose 121  103  106    BUN 17  15  23    Creatinine 0.90  0.80  1.03    EGFR 62.0  71.0  52.4    Sodium 137  137  138    Potassium 4.1  4.4  4.2    Chloride 107  105  103    Calcium 9.1  9.0  8.8    Total Protein 7.0  7.5  7.3    Albumin 4.00  4.0  4.1    Globulin 3.0  3.5  3.2    Total Bilirubin 0.7  0.6  0.6    Alkaline Phosphatase 55  57  36    AST (SGOT) 103  52  33    ALT (SGPT) 68  25  24    Albumin/Globulin Ratio 1.3  1.1  1.3    BUN/Creatinine Ratio 18.9  18.8  22.3    Anion Gap 6.0  7.0  9.0      CBC w/diff          10/26/2022    09:37 2/27/2023    09:11 8/31/2023    09:01   CBC w/Diff   WBC 4.13  4.91  4.05    RBC 3.82  3.99  3.83    Hemoglobin 12.1  12.8  12.4    Hematocrit 37.0  39.0  37.3    MCV 96.9  97.7  97.4    MCH 31.7  32.1  32.4    MCHC 32.7  32.8  33.2    RDW 12.8  12.2  12.3    Platelets 205  203  157    Neutrophil Rel % 69.2  72.6  69.1    Lymphocyte Rel % 16.5  13.6  16.5    Monocyte Rel % 10.7  11.2  10.4    Eosinophil Rel % 2.4  1.8  3.0    Basophil Rel % 1.2  0.8  1.0      Lipid Panel          10/26/2022    09:37 2/27/2023    09:11 8/31/2023    09:01   Lipid Panel   Total Cholesterol 174      Triglycerides 158      HDL Cholesterol 88      VLDL Cholesterol 26      LDL Cholesterol  60  69  62    LDL/HDL Ratio 0.62        TSH          10/26/2022    09:37 8/31/2023    09:01   TSH   TSH 2.310  3.660                     Assessment and Plan   Diagnoses and all orders for this visit:    1. Essential hypertension (Primary)  -     Comprehensive Metabolic Panel; Future    2. Seizure disorder  -     phenytoin ER (DILANTIN) 100 MG capsule; Take 1 capsule by mouth 3 (Three) Times a Week.   Dispense: 40 capsule; Refill: 3  -     levETIRAcetam XR (KEPPRA XR) 500 MG 24 hr tablet; 5 tablets qhs  Dispense: 150 tablet; Refill: 11  -     Phenytoin Level, Total; Future    3. History of sustained ventricular tachycardia - S/P ICD placement  -     Comprehensive Metabolic Panel; Future    4. Mixed hyperlipidemia  -     Lipid Panel; Future    5. Sensorineural hearing loss (SNHL) of both ears    6. Vitamin D deficiency  -     Vitamin D,25-Hydroxy; Future    7. Dietary vitamin B12 deficiency anemia  -     CBC Auto Differential; Future  -     Folate; Future  -     Vitamin B12; Future    8. Iron deficiency anemia secondary to inadequate dietary iron intake  -     CBC Auto Differential; Future  -     Ferritin; Future  -     Iron Profile; Future    9. Recurrent major depressive disorder, in full remission    10. Cognitive impairment - mmse 22/30    11. Chronic systolic congestive heart failure  -     BNP; Future  -     Comprehensive Metabolic Panel; Future    12. Ischemic cardiomyopathy  -     BNP; Future    Other orders  -     iron polysaccharides (Ferrex 150) 150 MG capsule; Take 1 capsule by mouth Daily.  Dispense: 90 capsule; Refill: 3               Recommended they decrease from 6 Keppra tablets each evening to 5 tablets each evening.  We will continue the Dilantin 100 mg three times weekly.  Liver enzymes have normalized with lower dose of Dilantin.  This may have been the reason for the elevated LFTs, but not sure.  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  Labile hypertension is improved and BP at goal.  Continue current multidrug therapy and sodium restriction.  Continue sotalol this patient with history of V. tach requiring defibrillator placement.  Continue monitoring blood pressure at home.     Continue simvastatin.  LDL cholesterol is  at goal.      Continue weekly Vitamin D 50,000 units monthly, at goal.  She is currently on a drug holiday from osteoporosis medications.  Repeat DEXA in approximately 1 year.    Iron levels drifting down, for which we will have patient increase oral iron from 3 to 5 days weekly for 1 month, then increasing to daily if tolerated.       Recommended updated COVID booster and high dose flu vaccine late September/October.     Recommended hearing evaluation/audiometry testing to evaluate hearing loss.     Return in 6 months for routine follow up with fasting labs one week prior or sooner if needed.     Scribed for Dr. Causey by Erin Cvaazos [facility].      Follow Up   Return in about 6 months (around 3/12/2024) for Follow up in six months with labs one week prior., Next scheduled follow up - labs 1 week prior.  Patient was given instructions and counseling regarding her condition or for health maintenance advice. Please see specific information pulled into the AVS if appropriate.

## 2023-09-26 DIAGNOSIS — K21.9 GASTROESOPHAGEAL REFLUX DISEASE WITHOUT ESOPHAGITIS: ICD-10-CM

## 2023-09-26 DIAGNOSIS — I10 ESSENTIAL HYPERTENSION: Primary | Chronic | ICD-10-CM

## 2023-09-26 RX ORDER — PANTOPRAZOLE SODIUM 40 MG/1
40 TABLET, DELAYED RELEASE ORAL 2 TIMES DAILY
Qty: 60 TABLET | Refills: 11 | Status: SHIPPED | OUTPATIENT
Start: 2023-09-26

## 2023-09-26 RX ORDER — SOTALOL HYDROCHLORIDE 80 MG/1
80 TABLET ORAL 2 TIMES DAILY
Qty: 60 TABLET | Refills: 5 | Status: SHIPPED | OUTPATIENT
Start: 2023-09-26

## 2023-09-26 RX ORDER — AMLODIPINE BESYLATE 5 MG/1
5 TABLET ORAL 2 TIMES DAILY
Qty: 60 TABLET | Refills: 5 | Status: SHIPPED | OUTPATIENT
Start: 2023-09-26

## 2024-01-08 NOTE — TELEPHONE ENCOUNTER
Patient had access to medicine with No PA needed.    Patient identified with a potential need for Chronic Care Management services.  Called patient to introduce self and availability of Chronic Care Management services.  Patient informed about the following service elements:  Health information sharing - complying with all laws related to privacy and security of their health information  Patient/Insurance Cost sharing - includes deductible and any ongoing copays and/or coinsurance  Only one provider can bill for this service monthly  Patient right to discontinue services at any time for any reason effective last day of current month  Patient verbally declines to participate in Chronic Care Management services and any associated charges.